# Patient Record
Sex: MALE | Race: WHITE | NOT HISPANIC OR LATINO | Employment: OTHER | URBAN - METROPOLITAN AREA
[De-identification: names, ages, dates, MRNs, and addresses within clinical notes are randomized per-mention and may not be internally consistent; named-entity substitution may affect disease eponyms.]

---

## 2019-07-02 ENCOUNTER — APPOINTMENT (OUTPATIENT)
Dept: CT IMAGING | Facility: HOSPITAL | Age: 59
End: 2019-07-02

## 2019-07-02 ENCOUNTER — APPOINTMENT (OUTPATIENT)
Dept: GENERAL RADIOLOGY | Facility: HOSPITAL | Age: 59
End: 2019-07-02

## 2019-07-02 ENCOUNTER — HOSPITAL ENCOUNTER (EMERGENCY)
Facility: HOSPITAL | Age: 59
Discharge: LEFT BEFORE DISCHARGE | End: 2019-07-02
Attending: EMERGENCY MEDICINE | Admitting: EMERGENCY MEDICINE

## 2019-07-02 VITALS
HEART RATE: 61 BPM | HEIGHT: 72 IN | OXYGEN SATURATION: 100 % | BODY MASS INDEX: 23.7 KG/M2 | WEIGHT: 175 LBS | SYSTOLIC BLOOD PRESSURE: 128 MMHG | RESPIRATION RATE: 16 BRPM | DIASTOLIC BLOOD PRESSURE: 88 MMHG | TEMPERATURE: 97.5 F

## 2019-07-02 DIAGNOSIS — R03.0 ELEVATED BLOOD PRESSURE READING: ICD-10-CM

## 2019-07-02 DIAGNOSIS — R41.82 ACUTE ALTERATION IN MENTAL STATUS: Primary | ICD-10-CM

## 2019-07-02 DIAGNOSIS — L25.9 CONTACT DERMATITIS, UNSPECIFIED CONTACT DERMATITIS TYPE, UNSPECIFIED TRIGGER: ICD-10-CM

## 2019-07-02 DIAGNOSIS — M62.82 NON-TRAUMATIC RHABDOMYOLYSIS: ICD-10-CM

## 2019-07-02 DIAGNOSIS — R60.0 ARM EDEMA: ICD-10-CM

## 2019-07-02 LAB
ALBUMIN SERPL-MCNC: 3.4 G/DL (ref 3.5–5.2)
ALBUMIN/GLOB SERPL: 1 G/DL
ALP SERPL-CCNC: 69 U/L (ref 39–117)
ALT SERPL W P-5'-P-CCNC: 17 U/L (ref 1–41)
ANION GAP SERPL CALCULATED.3IONS-SCNC: 12 MMOL/L (ref 5–15)
APTT PPP: 34.5 SECONDS (ref 24–37)
AST SERPL-CCNC: 30 U/L (ref 1–40)
BASOPHILS # BLD AUTO: 0.05 10*3/MM3 (ref 0–0.2)
BASOPHILS NFR BLD AUTO: 0.6 % (ref 0–1.5)
BILIRUB SERPL-MCNC: 0.2 MG/DL (ref 0.2–1.2)
BUN BLD-MCNC: 19 MG/DL (ref 6–20)
BUN BLDA-MCNC: 20 MG/DL (ref 8–26)
BUN/CREAT SERPL: 22.6 (ref 7–25)
CA-I BLDA-SCNC: 1.27 MMOL/L (ref 1.2–1.32)
CALCIUM SPEC-SCNC: 9.2 MG/DL (ref 8.6–10.5)
CHLORIDE BLDA-SCNC: 103 MMOL/L (ref 98–109)
CHLORIDE SERPL-SCNC: 104 MMOL/L (ref 98–107)
CK SERPL-CCNC: 207 U/L (ref 20–200)
CO2 BLDA-SCNC: 26 MMOL/L (ref 24–29)
CO2 SERPL-SCNC: 24 MMOL/L (ref 22–29)
CREAT BLD-MCNC: 0.84 MG/DL (ref 0.76–1.27)
CREAT BLDA-MCNC: 0.7 MG/DL (ref 0.6–1.3)
DEPRECATED RDW RBC AUTO: 49.7 FL (ref 37–54)
EOSINOPHIL # BLD AUTO: 0.24 10*3/MM3 (ref 0–0.4)
EOSINOPHIL NFR BLD AUTO: 2.7 % (ref 0.3–6.2)
ERYTHROCYTE [DISTWIDTH] IN BLOOD BY AUTOMATED COUNT: 14.7 % (ref 12.3–15.4)
ETHANOL BLD-MCNC: <10 MG/DL (ref 0–10)
GFR SERPL CREATININE-BSD FRML MDRD: 94 ML/MIN/1.73
GLOBULIN UR ELPH-MCNC: 3.3 GM/DL
GLUCOSE BLD-MCNC: 120 MG/DL (ref 65–99)
GLUCOSE BLDC GLUCOMTR-MCNC: 115 MG/DL (ref 70–130)
HCT VFR BLD AUTO: 35.5 % (ref 37.5–51)
HCT VFR BLDA CALC: 35 % (ref 38–51)
HGB BLD-MCNC: 12.2 G/DL (ref 13–17.7)
HGB BLDA-MCNC: 11.9 G/DL (ref 12–17)
IMM GRANULOCYTES # BLD AUTO: 0.03 10*3/MM3 (ref 0–0.05)
IMM GRANULOCYTES NFR BLD AUTO: 0.3 % (ref 0–0.5)
INR PPP: 1.1 (ref 0.8–1.2)
LIPASE SERPL-CCNC: 174 U/L (ref 13–60)
LYMPHOCYTES # BLD AUTO: 2.51 10*3/MM3 (ref 0.7–3.1)
LYMPHOCYTES NFR BLD AUTO: 28.4 % (ref 19.6–45.3)
MCH RBC QN AUTO: 31.4 PG (ref 26.6–33)
MCHC RBC AUTO-ENTMCNC: 34.4 G/DL (ref 31.5–35.7)
MCV RBC AUTO: 91.3 FL (ref 79–97)
MONOCYTES # BLD AUTO: 0.63 10*3/MM3 (ref 0.1–0.9)
MONOCYTES NFR BLD AUTO: 7.1 % (ref 5–12)
NEUTROPHILS # BLD AUTO: 5.37 10*3/MM3 (ref 1.7–7)
NEUTROPHILS NFR BLD AUTO: 60.9 % (ref 42.7–76)
NRBC BLD AUTO-RTO: 0 /100 WBC (ref 0–0.2)
NT-PROBNP SERPL-MCNC: 357.9 PG/ML (ref 5–900)
PLATELET # BLD AUTO: 222 10*3/MM3 (ref 140–450)
PMV BLD AUTO: 10.8 FL (ref 6–12)
POTASSIUM BLD-SCNC: 4.4 MMOL/L (ref 3.5–5.2)
POTASSIUM BLDA-SCNC: 4.2 MMOL/L (ref 3.5–4.9)
PROT SERPL-MCNC: 6.7 G/DL (ref 6–8.5)
PROTHROMBIN TIME: 13 SECONDS (ref 12.8–15.2)
RBC # BLD AUTO: 3.89 10*6/MM3 (ref 4.14–5.8)
SODIUM BLD-SCNC: 140 MMOL/L (ref 136–145)
SODIUM BLDA-SCNC: 141 MMOL/L (ref 138–146)
TROPONIN T SERPL-MCNC: <0.01 NG/ML (ref 0–0.03)
WBC NRBC COR # BLD: 8.83 10*3/MM3 (ref 3.4–10.8)

## 2019-07-02 PROCEDURE — 85014 HEMATOCRIT: CPT

## 2019-07-02 PROCEDURE — 70498 CT ANGIOGRAPHY NECK: CPT

## 2019-07-02 PROCEDURE — 99285 EMERGENCY DEPT VISIT HI MDM: CPT

## 2019-07-02 PROCEDURE — 25010000002 ONDANSETRON PER 1 MG: Performed by: EMERGENCY MEDICINE

## 2019-07-02 PROCEDURE — 83690 ASSAY OF LIPASE: CPT | Performed by: EMERGENCY MEDICINE

## 2019-07-02 PROCEDURE — 85730 THROMBOPLASTIN TIME PARTIAL: CPT | Performed by: EMERGENCY MEDICINE

## 2019-07-02 PROCEDURE — 70450 CT HEAD/BRAIN W/O DYE: CPT

## 2019-07-02 PROCEDURE — 83880 ASSAY OF NATRIURETIC PEPTIDE: CPT | Performed by: EMERGENCY MEDICINE

## 2019-07-02 PROCEDURE — 82550 ASSAY OF CK (CPK): CPT | Performed by: EMERGENCY MEDICINE

## 2019-07-02 PROCEDURE — 80307 DRUG TEST PRSMV CHEM ANLYZR: CPT | Performed by: EMERGENCY MEDICINE

## 2019-07-02 PROCEDURE — 0042T HC CT CEREBRAL PERFUSION W/WO CONTRAST: CPT

## 2019-07-02 PROCEDURE — 70496 CT ANGIOGRAPHY HEAD: CPT

## 2019-07-02 PROCEDURE — 71045 X-RAY EXAM CHEST 1 VIEW: CPT

## 2019-07-02 PROCEDURE — 0 IOPAMIDOL PER 1 ML: Performed by: EMERGENCY MEDICINE

## 2019-07-02 PROCEDURE — 80053 COMPREHEN METABOLIC PANEL: CPT | Performed by: EMERGENCY MEDICINE

## 2019-07-02 PROCEDURE — 96374 THER/PROPH/DIAG INJ IV PUSH: CPT

## 2019-07-02 PROCEDURE — 80047 BASIC METABLC PNL IONIZED CA: CPT

## 2019-07-02 PROCEDURE — 85025 COMPLETE CBC W/AUTO DIFF WBC: CPT | Performed by: EMERGENCY MEDICINE

## 2019-07-02 PROCEDURE — 84484 ASSAY OF TROPONIN QUANT: CPT | Performed by: EMERGENCY MEDICINE

## 2019-07-02 PROCEDURE — 85610 PROTHROMBIN TIME: CPT

## 2019-07-02 PROCEDURE — 93005 ELECTROCARDIOGRAM TRACING: CPT | Performed by: EMERGENCY MEDICINE

## 2019-07-02 RX ORDER — SODIUM CHLORIDE 0.9 % (FLUSH) 0.9 %
10 SYRINGE (ML) INJECTION AS NEEDED
Status: DISCONTINUED | OUTPATIENT
Start: 2019-07-02 | End: 2019-07-03 | Stop reason: HOSPADM

## 2019-07-02 RX ORDER — ONDANSETRON 2 MG/ML
4 INJECTION INTRAMUSCULAR; INTRAVENOUS ONCE
Status: COMPLETED | OUTPATIENT
Start: 2019-07-02 | End: 2019-07-02

## 2019-07-02 RX ADMIN — IOPAMIDOL 115 ML: 755 INJECTION, SOLUTION INTRAVENOUS at 20:04

## 2019-07-02 RX ADMIN — SODIUM CHLORIDE, POTASSIUM CHLORIDE, SODIUM LACTATE AND CALCIUM CHLORIDE 1000 ML: 600; 310; 30; 20 INJECTION, SOLUTION INTRAVENOUS at 20:50

## 2019-07-02 RX ADMIN — ONDANSETRON 4 MG: 2 INJECTION INTRAMUSCULAR; INTRAVENOUS at 20:51

## 2019-07-02 NOTE — ED PROVIDER NOTES
Subjective   Jaime Connell is a 59 y.o.male who presents to the emergency department with complaints of altered mental status. The patient's last known normal was today at 19:00. He was brought to the emergency department by EMS, who report they were called to the patient's house for vomiting. Upon their arrival, they found the patient laying on the ground unresponsive and his family states that he became unresponsive at 19:00. Per EMS, his family notes increased swelling in his extremities and vomiting. Upon arrival his family denies anticoagulation therapy and bleeding disorders. The patient does not have a history of cerebrovascular attack and seizures. There are no other known acute complaints at this time.        History provided by:  Patient, EMS personnel and relative  History limited by:  Patient unresponsive  Altered Mental Status   Presenting symptoms: unresponsiveness    Severity:  Severe  Most recent episode:  Today  Episode history:  Single  Duration:  1 hour  Timing:  Constant  Progression:  Improving  Chronicity:  New  Associated symptoms: vomiting and weakness        Review of Systems   Unable to perform ROS: Patient unresponsive   Gastrointestinal: Positive for vomiting.   Neurological: Positive for speech difficulty and weakness. Negative for facial asymmetry.       Past Medical History:   Diagnosis Date   • Back pain        Allergies   Allergen Reactions   • Penicillins Unknown (See Comments)     Pt unable to verify.       Past Surgical History:   Procedure Laterality Date   • BACK SURGERY         History reviewed. No pertinent family history.    Social History     Socioeconomic History   • Marital status: Single     Spouse name: Not on file   • Number of children: Not on file   • Years of education: Not on file   • Highest education level: Not on file   Tobacco Use   • Smoking status: Current Every Day Smoker     Packs/day: 1.50     Types: Cigarettes   Substance and Sexual Activity   • Alcohol  use: No     Frequency: Never   • Drug use: No         Objective   Physical Exam   Constitutional: He appears well-developed and well-nourished. No distress.   HENT:   Head: Normocephalic and atraumatic.   Eyes: Conjunctivae are normal. Pupils are equal, round, and reactive to light. No scleral icterus.   Neck: Normal range of motion. Neck supple.   Cardiovascular: Normal rate, regular rhythm and normal heart sounds.   Pulmonary/Chest: Effort normal. No respiratory distress.   Abdominal: Soft. There is no tenderness.   Musculoskeletal: Normal range of motion. He exhibits edema (BUE's involving hands and forearms to elbow. ).   Neurological: He is alert. No cranial nerve deficit. Coordination abnormal. GCS eye subscore is 4. GCS verbal subscore is 2. GCS motor subscore is 6.   Slight weakness in left arm. Able to hold up both legs.  Unintelligible noises with speech. No facial asymmetry.  Able to make eye contact and follow commands. Mild delayed  of left hand. NIHSS 9   Skin: Skin is warm and dry. Capillary refill takes less than 2 seconds.   Psychiatric: His behavior is normal.   Nursing note and vitals reviewed.      Critical Care  Performed by: Darci Fernando MD  Authorized by: Darci Fernando MD     Critical care provider statement:     Critical care time (minutes):  45    Critical care end time:  7/2/2019 10:42 PM    Critical care was necessary to treat or prevent imminent or life-threatening deterioration of the following conditions:  CNS failure or compromise    Critical care was time spent personally by me on the following activities:  Development of treatment plan with patient or surrogate, examination of patient, obtaining history from patient or surrogate, ordering and performing treatments and interventions, ordering and review of laboratory studies, ordering and review of radiographic studies, pulse oximetry and re-evaluation of patient's condition             ED Course  ED Course as of  Jul 02 2243 Tue Jul 02, 2019 1954 Dr. Fernando tried to contact the family at the home number in our system but they did not answer. Not a candidate for tpa at this point secondary to inability to obtain history, medications, and prior medical history. There is minimal history at our facility. No other charts available, unknown risk factors, or exclusion criteria.   [BM]   1958 I reviewed personally reviewed the images and report with radiology demonstrating no evidence of intracerebral hemorrhage or acute abnormality. CT Head Without Contrast Stroke Protocol [RS]   2005 I was able to talk to the family that is now arrived at the emergency department.  They do confirm the symptoms were sudden onset at 7 PM.  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  He states since then he has had some swelling of both arms with associated discomfort.  Otherwise, they state he was acting normally up until 7 PM tonight.  Prior he did not have any deficits or speech issues.  No history of seizures, alcohol consumption, stroke, or similar.  They deny any history of recent bleeding, bleeding disorder, anticoagulants, or other exclusion criteria.  I did discuss the risks and benefits of TPA if there is an abnormality demonstrated on perfusion scan.  I discussed the benefits of improvement versus the risk of intracerebral or bleeding elsewhere.  They say they are agreeable with proceeding if there is an abnormality on his imaging study.  [RS]   2011 Patient's mental status is improving and he is able to speak some words now.  He is more alert and more responsive.  [RS]   2056 Troponin T: <0.010 [RS]   2056 proBNP: 357.9 [RS]   2056 The patient is now back to neurologic baseline.  He is alert and oriented and able to answer questions appropriately with normal speech.  I talked with him about the plan for further evaluation and admission  seizure versus TIA.  Patient is refusing admission and had initially refused any further evaluation here in the emergency department.  I talked with him about my concerns with the differential diagnosis and potential for life-threatening or debilitating etiology.  After consideration, the patient is agreeable for further evaluation here in the emergency department but he refuses adamantly admission to the hospital or MRI.  Patient states he wants to leave the hospital in about 1 hour.  [RS]   2141 Creatine Kinase: (!) 207 [RS]   2205 Patient is remained stable.  He is now back to neurologic baseline.  I talked with him once again to discuss possible admission.  The patient continues to refuse MRI or admission to the hospital.  We reviewed the risks once again.  I advised him to return immediately to the emergency department if he has any change of mind or concerning symptoms/worsening.  Talked with the wife and she states that she is unable to talk him into staying.  The patient voices understanding and is agreeable with return instructions.   [RS]      ED Course User Index  [BM] Gabino Reyes  [RS] Darci Fernando MD       Recent Results (from the past 24 hour(s))   POC Protime / INR    Collection Time: 07/02/19  7:56 PM   Result Value Ref Range    Protime 13.0 12.8 - 15.2 seconds    INR 1.1 0.8 - 1.2   POC CHEM 8    Collection Time: 07/02/19  7:58 PM   Result Value Ref Range    Glucose 115 70 - 130 mg/dL    BUN 20 8 - 26 mg/dL    Creatinine 0.70 0.60 - 1.30 mg/dL    Sodium 141 138 - 146 mmol/L    Potassium 4.2 3.5 - 4.9 mmol/L    Chloride 103 98 - 109 mmol/L    Total CO2 26 24 - 29 mmol/L    Hemoglobin 11.9 (L) 12.0 - 17.0 g/dL    Hematocrit 35 (L) 38 - 51 %    Ionized Calcium 1.27 1.20 - 1.32 mmol/L   Comprehensive Metabolic Panel    Collection Time: 07/02/19  8:11 PM   Result Value Ref Range    Glucose 120 (H) 65 - 99 mg/dL    BUN 19 6 - 20 mg/dL    Creatinine 0.84 0.76 - 1.27 mg/dL    Sodium 140 136  - 145 mmol/L    Potassium 4.4 3.5 - 5.2 mmol/L    Chloride 104 98 - 107 mmol/L    CO2 24.0 22.0 - 29.0 mmol/L    Calcium 9.2 8.6 - 10.5 mg/dL    Total Protein 6.7 6.0 - 8.5 g/dL    Albumin 3.40 (L) 3.50 - 5.20 g/dL    ALT (SGPT) 17 1 - 41 U/L    AST (SGOT) 30 1 - 40 U/L    Alkaline Phosphatase 69 39 - 117 U/L    Total Bilirubin 0.2 0.2 - 1.2 mg/dL    eGFR Non African Amer 94 >60 mL/min/1.73    Globulin 3.3 gm/dL    A/G Ratio 1.0 g/dL    BUN/Creatinine Ratio 22.6 7.0 - 25.0    Anion Gap 12.0 5.0 - 15.0 mmol/L   Lipase    Collection Time: 07/02/19  8:11 PM   Result Value Ref Range    Lipase 174 (H) 13 - 60 U/L   BNP    Collection Time: 07/02/19  8:11 PM   Result Value Ref Range    proBNP 357.9 5.0 - 900.0 pg/mL   Troponin    Collection Time: 07/02/19  8:11 PM   Result Value Ref Range    Troponin T <0.010 0.000 - 0.030 ng/mL   CBC Auto Differential    Collection Time: 07/02/19  8:11 PM   Result Value Ref Range    WBC 8.83 3.40 - 10.80 10*3/mm3    RBC 3.89 (L) 4.14 - 5.80 10*6/mm3    Hemoglobin 12.2 (L) 13.0 - 17.7 g/dL    Hematocrit 35.5 (L) 37.5 - 51.0 %    MCV 91.3 79.0 - 97.0 fL    MCH 31.4 26.6 - 33.0 pg    MCHC 34.4 31.5 - 35.7 g/dL    RDW 14.7 12.3 - 15.4 %    RDW-SD 49.7 37.0 - 54.0 fl    MPV 10.8 6.0 - 12.0 fL    Platelets 222 140 - 450 10*3/mm3    Neutrophil % 60.9 42.7 - 76.0 %    Lymphocyte % 28.4 19.6 - 45.3 %    Monocyte % 7.1 5.0 - 12.0 %    Eosinophil % 2.7 0.3 - 6.2 %    Basophil % 0.6 0.0 - 1.5 %    Immature Grans % 0.3 0.0 - 0.5 %    Neutrophils, Absolute 5.37 1.70 - 7.00 10*3/mm3    Lymphocytes, Absolute 2.51 0.70 - 3.10 10*3/mm3    Monocytes, Absolute 0.63 0.10 - 0.90 10*3/mm3    Eosinophils, Absolute 0.24 0.00 - 0.40 10*3/mm3    Basophils, Absolute 0.05 0.00 - 0.20 10*3/mm3    Immature Grans, Absolute 0.03 0.00 - 0.05 10*3/mm3    nRBC 0.0 0.0 - 0.2 /100 WBC   CK    Collection Time: 07/02/19  8:11 PM   Result Value Ref Range    Creatine Kinase 207 (H) 20 - 200 U/L   Ethanol    Collection Time:  07/02/19  8:11 PM   Result Value Ref Range    Ethanol <10 0 - 10 mg/dL   aPTT    Collection Time: 07/02/19  8:52 PM   Result Value Ref Range    PTT 34.5 24.0 - 37.0 seconds     Note: In addition to lab results from this visit, the labs listed above may include labs taken at another facility or during a different encounter within the last 24 hours. Please correlate lab times with ED admission and discharge times for further clarification of the services performed during this visit.    XR Chest 1 View   Final Result   No acute cardiopulmonary findings.      Signer Name: KURT Sheets MD    Signed: 7/2/2019 10:31 PM    Workstation Name: [email or web address]          CT Angiogram Neck With & Without Contrast         CT Angiogram Head With & Without Contrast         CT Cerebral Perfusion With & Without Contrast   Preliminary Result   Normal brain perfusion CT.                  CT Head Without Contrast Stroke Protocol   Final Result   Negative noncontrast CT of the brain. No evidence of acute intracranial hemorrhage..         NOTIFICATION: Critical Value/emergent results were called by telephone at the time of interpretation on 7/2/2019 7:55 PM to CT TechnologistBasil, who verbally acknowledged these results and indicated she would notify the ER physician.      Signer Name: Sabino Rayo MD    Signed: 7/2/2019 7:57 PM    Workstation Name: [email or web address]            Vitals:    07/02/19 2130 07/02/19 2145 07/02/19 2200 07/02/19 2215   BP: 152/89 147/91 149/96 128/88   BP Location:       Patient Position:       Pulse: 50 51 53 61   Resp:       Temp:       TempSrc:       SpO2: 100% 100% 100% 100%   Weight:       Height:         Medications   sodium chloride 0.9 % flush 10 mL (not administered)   sodium chloride 0.9 % flush 10 mL (not administered)   iopamidol (ISOVUE-370) 76 % injection 150 mL (115 mL Intravenous Given 7/2/19 2004)   lactated ringers bolus 1,000 mL (0 mL Intravenous Stopped 7/2/19 2152)   ondansetron (ZOFRAN)  injection 4 mg (4 mg Intravenous Given 7/2/19 2051)     ECG/EMG Results (last 24 hours)     ** No results found for the last 24 hours. **        ECG 12 Lead   Final Result   Test Reason : Acute Stroke Protocol (onset < 12 hrs)   Blood Pressure : **/** mmHG   Vent. Rate : 059 BPM     Atrial Rate : 059 BPM      P-R Int : 146 ms          QRS Dur : 092 ms       QT Int : 404 ms       P-R-T Axes : 073 076 062 degrees      QTc Int : 399 ms      Sinus bradycardia   Otherwise normal ECG   No previous ECGs available   Confirmed by ELIZABETH FERNANDO MD ([id number]) on 7/2/2019 10:30:01 PM      Referred By:  TERESSA           Confirmed By:ELIZABETH FERNANDO MD                        MDM  Number of Diagnoses or Management Options  Acute alteration in mental status:   Arm edema:   Contact dermatitis, unspecified contact dermatitis type, unspecified trigger:   Elevated blood pressure reading:   Non-traumatic rhabdomyolysis:      Amount and/or Complexity of Data Reviewed  Clinical lab tests: reviewed  Tests in the radiology section of CPT®: reviewed  Decide to obtain previous medical records or to obtain history from someone other than the patient: yes  Obtain history from someone other than the patient: yes  Independent visualization of images, tracings, or specimens: yes        Final diagnoses:   Acute alteration in mental status   Arm edema   Contact dermatitis, unspecified contact dermatitis type, unspecified trigger   Non-traumatic rhabdomyolysis   Elevated blood pressure reading       Documentation assistance provided by dean Reyes.  Information recorded by the scribe was done at my direction and has been verified and validated by me.     Gabino Reyes  07/02/19 2015       Elizabeth Fernando MD  07/02/19 3799

## 2019-07-19 ENCOUNTER — HOSPITAL ENCOUNTER (INPATIENT)
Facility: HOSPITAL | Age: 59
LOS: 3 days | Discharge: HOME | End: 2019-07-23
Attending: EMERGENCY MEDICINE | Admitting: INTERNAL MEDICINE

## 2019-07-19 DIAGNOSIS — K25.2 ACUTE GASTRIC ULCER WITH HEMORRHAGE AND PERFORATION (HCC): Primary | ICD-10-CM

## 2019-07-19 DIAGNOSIS — Z74.09 IMPAIRED FUNCTIONAL MOBILITY, BALANCE, GAIT, AND ENDURANCE: ICD-10-CM

## 2019-07-19 DIAGNOSIS — R58 HYPOTENSION DUE TO BLOOD LOSS: ICD-10-CM

## 2019-07-19 DIAGNOSIS — I95.89 HYPOTENSION DUE TO BLOOD LOSS: ICD-10-CM

## 2019-07-19 DIAGNOSIS — K92.0 HEMATEMESIS WITH NAUSEA: ICD-10-CM

## 2019-07-19 DIAGNOSIS — R11.2 NAUSEA AND VOMITING IN ADULT: ICD-10-CM

## 2019-07-19 PROCEDURE — 84145 PROCALCITONIN (PCT): CPT | Performed by: EMERGENCY MEDICINE

## 2019-07-19 PROCEDURE — 84484 ASSAY OF TROPONIN QUANT: CPT | Performed by: EMERGENCY MEDICINE

## 2019-07-19 PROCEDURE — 99285 EMERGENCY DEPT VISIT HI MDM: CPT

## 2019-07-19 PROCEDURE — 86850 RBC ANTIBODY SCREEN: CPT | Performed by: EMERGENCY MEDICINE

## 2019-07-19 PROCEDURE — 86920 COMPATIBILITY TEST SPIN: CPT

## 2019-07-19 PROCEDURE — 93005 ELECTROCARDIOGRAM TRACING: CPT

## 2019-07-19 PROCEDURE — 93005 ELECTROCARDIOGRAM TRACING: CPT | Performed by: EMERGENCY MEDICINE

## 2019-07-19 PROCEDURE — 83880 ASSAY OF NATRIURETIC PEPTIDE: CPT | Performed by: EMERGENCY MEDICINE

## 2019-07-19 PROCEDURE — 85025 COMPLETE CBC W/AUTO DIFF WBC: CPT | Performed by: EMERGENCY MEDICINE

## 2019-07-19 PROCEDURE — 86901 BLOOD TYPING SEROLOGIC RH(D): CPT | Performed by: EMERGENCY MEDICINE

## 2019-07-19 PROCEDURE — 86900 BLOOD TYPING SEROLOGIC ABO: CPT | Performed by: EMERGENCY MEDICINE

## 2019-07-19 PROCEDURE — 80053 COMPREHEN METABOLIC PANEL: CPT | Performed by: EMERGENCY MEDICINE

## 2019-07-19 RX ORDER — SODIUM CHLORIDE 0.9 % (FLUSH) 0.9 %
10 SYRINGE (ML) INJECTION AS NEEDED
Status: DISCONTINUED | OUTPATIENT
Start: 2019-07-19 | End: 2019-07-23 | Stop reason: HOSPADM

## 2019-07-20 ENCOUNTER — ANESTHESIA EVENT (OUTPATIENT)
Dept: GASTROENTEROLOGY | Facility: HOSPITAL | Age: 59
End: 2019-07-20

## 2019-07-20 ENCOUNTER — APPOINTMENT (OUTPATIENT)
Dept: GENERAL RADIOLOGY | Facility: HOSPITAL | Age: 59
End: 2019-07-20

## 2019-07-20 ENCOUNTER — ANESTHESIA (OUTPATIENT)
Dept: GASTROENTEROLOGY | Facility: HOSPITAL | Age: 59
End: 2019-07-20

## 2019-07-20 ENCOUNTER — APPOINTMENT (OUTPATIENT)
Dept: CT IMAGING | Facility: HOSPITAL | Age: 59
End: 2019-07-20

## 2019-07-20 PROBLEM — K25.2 ACUTE GASTRIC ULCER WITH HEMORRHAGE AND PERFORATION (HCC): Status: ACTIVE | Noted: 2019-07-20

## 2019-07-20 PROBLEM — R76.8 HEPATITIS C ANTIBODY TEST POSITIVE: Status: ACTIVE | Noted: 2019-07-20

## 2019-07-20 PROBLEM — D50.0 ANEMIA, BLOOD LOSS: Status: ACTIVE | Noted: 2019-07-20

## 2019-07-20 PROBLEM — F17.200 SMOKER: Status: ACTIVE | Noted: 2019-07-20

## 2019-07-20 LAB
ABO GROUP BLD: NORMAL
ABO GROUP BLD: NORMAL
ALBUMIN SERPL-MCNC: 3.5 G/DL (ref 3.5–5.2)
ALBUMIN/GLOB SERPL: 1.2 G/DL
ALP SERPL-CCNC: 78 U/L (ref 39–117)
ALT SERPL W P-5'-P-CCNC: 100 U/L (ref 1–41)
ANION GAP SERPL CALCULATED.3IONS-SCNC: 7 MMOL/L (ref 5–15)
ANION GAP SERPL CALCULATED.3IONS-SCNC: 9 MMOL/L (ref 5–15)
AST SERPL-CCNC: 114 U/L (ref 1–40)
BASOPHILS # BLD AUTO: 0.04 10*3/MM3 (ref 0–0.2)
BASOPHILS # BLD AUTO: 0.05 10*3/MM3 (ref 0–0.2)
BASOPHILS NFR BLD AUTO: 0.5 % (ref 0–1.5)
BASOPHILS NFR BLD AUTO: 0.5 % (ref 0–1.5)
BILIRUB SERPL-MCNC: 0.3 MG/DL (ref 0.2–1.2)
BLD GP AB SCN SERPL QL: NEGATIVE
BUN BLD-MCNC: 32 MG/DL (ref 6–20)
BUN BLD-MCNC: 34 MG/DL (ref 6–20)
BUN/CREAT SERPL: 32 (ref 7–25)
BUN/CREAT SERPL: 36.2 (ref 7–25)
CALCIUM SPEC-SCNC: 8 MG/DL (ref 8.6–10.5)
CALCIUM SPEC-SCNC: 9 MG/DL (ref 8.6–10.5)
CHLORIDE SERPL-SCNC: 100 MMOL/L (ref 98–107)
CHLORIDE SERPL-SCNC: 108 MMOL/L (ref 98–107)
CO2 SERPL-SCNC: 26 MMOL/L (ref 22–29)
CO2 SERPL-SCNC: 31 MMOL/L (ref 22–29)
CREAT BLD-MCNC: 0.94 MG/DL (ref 0.76–1.27)
CREAT BLD-MCNC: 1 MG/DL (ref 0.76–1.27)
DEPRECATED RDW RBC AUTO: 51.4 FL (ref 37–54)
DEPRECATED RDW RBC AUTO: 52 FL (ref 37–54)
EOSINOPHIL # BLD AUTO: 0.14 10*3/MM3 (ref 0–0.4)
EOSINOPHIL # BLD AUTO: 0.26 10*3/MM3 (ref 0–0.4)
EOSINOPHIL NFR BLD AUTO: 1.7 % (ref 0.3–6.2)
EOSINOPHIL NFR BLD AUTO: 2.5 % (ref 0.3–6.2)
ERYTHROCYTE [DISTWIDTH] IN BLOOD BY AUTOMATED COUNT: 15.5 % (ref 12.3–15.4)
ERYTHROCYTE [DISTWIDTH] IN BLOOD BY AUTOMATED COUNT: 15.6 % (ref 12.3–15.4)
GFR SERPL CREATININE-BSD FRML MDRD: 76 ML/MIN/1.73
GFR SERPL CREATININE-BSD FRML MDRD: 82 ML/MIN/1.73
GLOBULIN UR ELPH-MCNC: 3 GM/DL
GLUCOSE BLD-MCNC: 107 MG/DL (ref 65–99)
GLUCOSE BLD-MCNC: 178 MG/DL (ref 65–99)
HAV IGM SERPL QL IA: ABNORMAL
HBA1C MFR BLD: 5.9 % (ref 4.8–5.6)
HBV CORE IGM SERPL QL IA: ABNORMAL
HBV SURFACE AG SERPL QL IA: ABNORMAL
HCT VFR BLD AUTO: 28.4 % (ref 37.5–51)
HCT VFR BLD AUTO: 29 % (ref 37.5–51)
HCT VFR BLD AUTO: 30.9 % (ref 37.5–51)
HCV AB SER DONR QL: REACTIVE
HGB BLD-MCNC: 8.5 G/DL (ref 13–17.7)
HGB BLD-MCNC: 9.1 G/DL (ref 13–17.7)
HGB BLD-MCNC: 9.5 G/DL (ref 13–17.7)
HIV1+2 AB SER QL: NORMAL
HOLD SPECIMEN: NORMAL
HOLD SPECIMEN: NORMAL
IMM GRANULOCYTES # BLD AUTO: 0.03 10*3/MM3 (ref 0–0.05)
IMM GRANULOCYTES # BLD AUTO: 0.08 10*3/MM3 (ref 0–0.05)
IMM GRANULOCYTES NFR BLD AUTO: 0.4 % (ref 0–0.5)
IMM GRANULOCYTES NFR BLD AUTO: 0.8 % (ref 0–0.5)
LYMPHOCYTES # BLD AUTO: 2.52 10*3/MM3 (ref 0.7–3.1)
LYMPHOCYTES # BLD AUTO: 2.97 10*3/MM3 (ref 0.7–3.1)
LYMPHOCYTES NFR BLD AUTO: 24.4 % (ref 19.6–45.3)
LYMPHOCYTES NFR BLD AUTO: 36.8 % (ref 19.6–45.3)
MCH RBC QN AUTO: 28.1 PG (ref 26.6–33)
MCH RBC QN AUTO: 28.3 PG (ref 26.6–33)
MCHC RBC AUTO-ENTMCNC: 30.7 G/DL (ref 31.5–35.7)
MCHC RBC AUTO-ENTMCNC: 31.4 G/DL (ref 31.5–35.7)
MCV RBC AUTO: 90.3 FL (ref 79–97)
MCV RBC AUTO: 91.4 FL (ref 79–97)
MONOCYTES # BLD AUTO: 0.74 10*3/MM3 (ref 0.1–0.9)
MONOCYTES # BLD AUTO: 0.84 10*3/MM3 (ref 0.1–0.9)
MONOCYTES NFR BLD AUTO: 8.1 % (ref 5–12)
MONOCYTES NFR BLD AUTO: 9.2 % (ref 5–12)
NEUTROPHILS # BLD AUTO: 4.15 10*3/MM3 (ref 1.7–7)
NEUTROPHILS # BLD AUTO: 6.56 10*3/MM3 (ref 1.7–7)
NEUTROPHILS NFR BLD AUTO: 51.4 % (ref 42.7–76)
NEUTROPHILS NFR BLD AUTO: 63.7 % (ref 42.7–76)
NRBC BLD AUTO-RTO: 0 /100 WBC (ref 0–0.2)
NRBC BLD AUTO-RTO: 0 /100 WBC (ref 0–0.2)
NT-PROBNP SERPL-MCNC: 171.8 PG/ML (ref 5–900)
PLATELET # BLD AUTO: 281 10*3/MM3 (ref 140–450)
PLATELET # BLD AUTO: 361 10*3/MM3 (ref 140–450)
PMV BLD AUTO: 9.8 FL (ref 6–12)
PMV BLD AUTO: 9.9 FL (ref 6–12)
POTASSIUM BLD-SCNC: 4.2 MMOL/L (ref 3.5–5.2)
POTASSIUM BLD-SCNC: 4.4 MMOL/L (ref 3.5–5.2)
PROCALCITONIN SERPL-MCNC: 0.12 NG/ML (ref 0.1–0.25)
PROT SERPL-MCNC: 6.5 G/DL (ref 6–8.5)
RBC # BLD AUTO: 3.21 10*6/MM3 (ref 4.14–5.8)
RBC # BLD AUTO: 3.38 10*6/MM3 (ref 4.14–5.8)
RH BLD: POSITIVE
RH BLD: POSITIVE
SODIUM BLD-SCNC: 140 MMOL/L (ref 136–145)
SODIUM BLD-SCNC: 141 MMOL/L (ref 136–145)
T&S EXPIRATION DATE: NORMAL
TROPONIN T SERPL-MCNC: <0.01 NG/ML (ref 0–0.03)
TSH SERPL DL<=0.05 MIU/L-ACNC: 0.4 MIU/ML (ref 0.27–4.2)
WBC NRBC COR # BLD: 10.31 10*3/MM3 (ref 3.4–10.8)
WBC NRBC COR # BLD: 8.07 10*3/MM3 (ref 3.4–10.8)
WHOLE BLOOD HOLD SPECIMEN: NORMAL
WHOLE BLOOD HOLD SPECIMEN: NORMAL

## 2019-07-20 PROCEDURE — 87040 BLOOD CULTURE FOR BACTERIA: CPT | Performed by: EMERGENCY MEDICINE

## 2019-07-20 PROCEDURE — 88305 TISSUE EXAM BY PATHOLOGIST: CPT | Performed by: INTERNAL MEDICINE

## 2019-07-20 PROCEDURE — 86900 BLOOD TYPING SEROLOGIC ABO: CPT

## 2019-07-20 PROCEDURE — 86901 BLOOD TYPING SEROLOGIC RH(D): CPT

## 2019-07-20 PROCEDURE — 99291 CRITICAL CARE FIRST HOUR: CPT | Performed by: INTERNAL MEDICINE

## 2019-07-20 PROCEDURE — 0W3P8ZZ CONTROL BLEEDING IN GASTROINTESTINAL TRACT, VIA NATURAL OR ARTIFICIAL OPENING ENDOSCOPIC: ICD-10-PCS | Performed by: INTERNAL MEDICINE

## 2019-07-20 PROCEDURE — 71045 X-RAY EXAM CHEST 1 VIEW: CPT

## 2019-07-20 PROCEDURE — 99223 1ST HOSP IP/OBS HIGH 75: CPT | Performed by: INTERNAL MEDICINE

## 2019-07-20 PROCEDURE — 25010000002 SUCCINYLCHOLINE PER 20 MG: Performed by: ANESTHESIOLOGY

## 2019-07-20 PROCEDURE — 25010000003 LIDOCAINE 1 % SOLUTION: Performed by: ANESTHESIOLOGY

## 2019-07-20 PROCEDURE — 94640 AIRWAY INHALATION TREATMENT: CPT

## 2019-07-20 PROCEDURE — 36430 TRANSFUSION BLD/BLD COMPNT: CPT

## 2019-07-20 PROCEDURE — 84443 ASSAY THYROID STIM HORMONE: CPT | Performed by: INTERNAL MEDICINE

## 2019-07-20 PROCEDURE — P9016 RBC LEUKOCYTES REDUCED: HCPCS

## 2019-07-20 PROCEDURE — 99222 1ST HOSP IP/OBS MODERATE 55: CPT | Performed by: INTERNAL MEDICINE

## 2019-07-20 PROCEDURE — 0 IOPAMIDOL PER 1 ML: Performed by: EMERGENCY MEDICINE

## 2019-07-20 PROCEDURE — 25010000002 HYDROMORPHONE PER 4 MG: Performed by: INTERNAL MEDICINE

## 2019-07-20 PROCEDURE — 94799 UNLISTED PULMONARY SVC/PX: CPT

## 2019-07-20 PROCEDURE — G0432 EIA HIV-1/HIV-2 SCREEN: HCPCS | Performed by: INTERNAL MEDICINE

## 2019-07-20 PROCEDURE — 74018 RADEX ABDOMEN 1 VIEW: CPT

## 2019-07-20 PROCEDURE — 80074 ACUTE HEPATITIS PANEL: CPT | Performed by: INTERNAL MEDICINE

## 2019-07-20 PROCEDURE — 80048 BASIC METABOLIC PNL TOTAL CA: CPT | Performed by: INTERNAL MEDICINE

## 2019-07-20 PROCEDURE — 85018 HEMOGLOBIN: CPT | Performed by: INTERNAL MEDICINE

## 2019-07-20 PROCEDURE — 25010000002 THIAMINE PER 100 MG: Performed by: INTERNAL MEDICINE

## 2019-07-20 PROCEDURE — 0DB68ZX EXCISION OF STOMACH, VIA NATURAL OR ARTIFICIAL OPENING ENDOSCOPIC, DIAGNOSTIC: ICD-10-PCS | Performed by: INTERNAL MEDICINE

## 2019-07-20 PROCEDURE — 25010000002 PHENYLEPHRINE PER 1 ML: Performed by: ANESTHESIOLOGY

## 2019-07-20 PROCEDURE — 25010000002 PROMETHAZINE PER 50 MG: Performed by: INTERNAL MEDICINE

## 2019-07-20 PROCEDURE — 85014 HEMATOCRIT: CPT | Performed by: INTERNAL MEDICINE

## 2019-07-20 PROCEDURE — 83036 HEMOGLOBIN GLYCOSYLATED A1C: CPT | Performed by: INTERNAL MEDICINE

## 2019-07-20 PROCEDURE — 25010000002 PROPOFOL 10 MG/ML EMULSION: Performed by: ANESTHESIOLOGY

## 2019-07-20 PROCEDURE — 74174 CTA ABD&PLVS W/CONTRAST: CPT

## 2019-07-20 PROCEDURE — 85025 COMPLETE CBC W/AUTO DIFF WBC: CPT | Performed by: INTERNAL MEDICINE

## 2019-07-20 DEVICE — SYS CLIP GI OTSC NITNL 12/6T 220CM
Type: IMPLANTABLE DEVICE | Site: STOMACH | Status: NON-FUNCTIONAL
Removed: 2021-05-25

## 2019-07-20 RX ORDER — SUCCINYLCHOLINE CHLORIDE 20 MG/ML
INJECTION INTRAMUSCULAR; INTRAVENOUS AS NEEDED
Status: DISCONTINUED | OUTPATIENT
Start: 2019-07-20 | End: 2019-07-20 | Stop reason: SURG

## 2019-07-20 RX ORDER — IPRATROPIUM BROMIDE AND ALBUTEROL SULFATE 2.5; .5 MG/3ML; MG/3ML
3 SOLUTION RESPIRATORY (INHALATION)
Status: DISCONTINUED | OUTPATIENT
Start: 2019-07-20 | End: 2019-07-21

## 2019-07-20 RX ORDER — SODIUM CHLORIDE 0.9 % (FLUSH) 0.9 %
3 SYRINGE (ML) INJECTION EVERY 12 HOURS SCHEDULED
Status: DISCONTINUED | OUTPATIENT
Start: 2019-07-20 | End: 2019-07-23 | Stop reason: HOSPADM

## 2019-07-20 RX ORDER — FAMOTIDINE 10 MG/ML
20 INJECTION, SOLUTION INTRAVENOUS ONCE
Status: CANCELLED | OUTPATIENT
Start: 2019-07-20 | End: 2019-07-20

## 2019-07-20 RX ORDER — PANTOPRAZOLE SODIUM 40 MG/10ML
80 INJECTION, POWDER, LYOPHILIZED, FOR SOLUTION INTRAVENOUS ONCE
Status: COMPLETED | OUTPATIENT
Start: 2019-07-20 | End: 2019-07-20

## 2019-07-20 RX ORDER — FAMOTIDINE 20 MG/1
20 TABLET, FILM COATED ORAL ONCE
Status: CANCELLED | OUTPATIENT
Start: 2019-07-20 | End: 2019-07-20

## 2019-07-20 RX ORDER — SODIUM CHLORIDE, SODIUM LACTATE, POTASSIUM CHLORIDE, CALCIUM CHLORIDE 600; 310; 30; 20 MG/100ML; MG/100ML; MG/100ML; MG/100ML
9 INJECTION, SOLUTION INTRAVENOUS CONTINUOUS
Status: CANCELLED | OUTPATIENT
Start: 2019-07-20

## 2019-07-20 RX ORDER — IPRATROPIUM BROMIDE AND ALBUTEROL SULFATE 2.5; .5 MG/3ML; MG/3ML
3 SOLUTION RESPIRATORY (INHALATION) EVERY 4 HOURS PRN
Status: DISCONTINUED | OUTPATIENT
Start: 2019-07-20 | End: 2019-07-23 | Stop reason: HOSPADM

## 2019-07-20 RX ORDER — HYDROMORPHONE HYDROCHLORIDE 1 MG/ML
0.5 INJECTION, SOLUTION INTRAMUSCULAR; INTRAVENOUS; SUBCUTANEOUS
Status: CANCELLED | OUTPATIENT
Start: 2019-07-20

## 2019-07-20 RX ORDER — LIDOCAINE HYDROCHLORIDE 10 MG/ML
0.5 INJECTION, SOLUTION EPIDURAL; INFILTRATION; INTRACAUDAL; PERINEURAL ONCE AS NEEDED
Status: CANCELLED | OUTPATIENT
Start: 2019-07-20

## 2019-07-20 RX ORDER — SODIUM CHLORIDE, SODIUM LACTATE, POTASSIUM CHLORIDE, CALCIUM CHLORIDE 600; 310; 30; 20 MG/100ML; MG/100ML; MG/100ML; MG/100ML
INJECTION, SOLUTION INTRAVENOUS CONTINUOUS PRN
Status: DISCONTINUED | OUTPATIENT
Start: 2019-07-20 | End: 2019-07-20 | Stop reason: SURG

## 2019-07-20 RX ORDER — LIDOCAINE HYDROCHLORIDE 10 MG/ML
INJECTION, SOLUTION INFILTRATION; PERINEURAL AS NEEDED
Status: DISCONTINUED | OUTPATIENT
Start: 2019-07-20 | End: 2019-07-20 | Stop reason: SURG

## 2019-07-20 RX ORDER — SODIUM CHLORIDE 0.9 % (FLUSH) 0.9 %
3-10 SYRINGE (ML) INJECTION AS NEEDED
Status: CANCELLED | OUTPATIENT
Start: 2019-07-20

## 2019-07-20 RX ORDER — FENTANYL CITRATE 50 UG/ML
50 INJECTION, SOLUTION INTRAMUSCULAR; INTRAVENOUS
Status: CANCELLED | OUTPATIENT
Start: 2019-07-20

## 2019-07-20 RX ORDER — HYDROMORPHONE HYDROCHLORIDE 1 MG/ML
0.5 INJECTION, SOLUTION INTRAMUSCULAR; INTRAVENOUS; SUBCUTANEOUS
Status: DISCONTINUED | OUTPATIENT
Start: 2019-07-20 | End: 2019-07-23 | Stop reason: HOSPADM

## 2019-07-20 RX ORDER — PROPOFOL 10 MG/ML
VIAL (ML) INTRAVENOUS AS NEEDED
Status: DISCONTINUED | OUTPATIENT
Start: 2019-07-20 | End: 2019-07-20 | Stop reason: SURG

## 2019-07-20 RX ORDER — SODIUM CHLORIDE 0.9 % (FLUSH) 0.9 %
3-10 SYRINGE (ML) INJECTION AS NEEDED
Status: DISCONTINUED | OUTPATIENT
Start: 2019-07-20 | End: 2019-07-23 | Stop reason: HOSPADM

## 2019-07-20 RX ORDER — PROMETHAZINE HYDROCHLORIDE 25 MG/ML
12.5 INJECTION, SOLUTION INTRAMUSCULAR; INTRAVENOUS EVERY 6 HOURS PRN
Status: DISCONTINUED | OUTPATIENT
Start: 2019-07-20 | End: 2019-07-23 | Stop reason: HOSPADM

## 2019-07-20 RX ORDER — SODIUM CHLORIDE, SODIUM LACTATE, POTASSIUM CHLORIDE, CALCIUM CHLORIDE 600; 310; 30; 20 MG/100ML; MG/100ML; MG/100ML; MG/100ML
50 INJECTION, SOLUTION INTRAVENOUS CONTINUOUS
Status: DISCONTINUED | OUTPATIENT
Start: 2019-07-20 | End: 2019-07-23 | Stop reason: HOSPADM

## 2019-07-20 RX ORDER — SODIUM CHLORIDE 0.9 % (FLUSH) 0.9 %
3 SYRINGE (ML) INJECTION EVERY 12 HOURS SCHEDULED
Status: CANCELLED | OUTPATIENT
Start: 2019-07-20

## 2019-07-20 RX ADMIN — HYDROMORPHONE HYDROCHLORIDE 0.5 MG: 1 INJECTION, SOLUTION INTRAMUSCULAR; INTRAVENOUS; SUBCUTANEOUS at 18:32

## 2019-07-20 RX ADMIN — PHENYLEPHRINE HYDROCHLORIDE 400 MCG: 10 INJECTION INTRAVENOUS at 09:20

## 2019-07-20 RX ADMIN — SODIUM CHLORIDE, PRESERVATIVE FREE 3 ML: 5 INJECTION INTRAVENOUS at 03:12

## 2019-07-20 RX ADMIN — THIAMINE HYDROCHLORIDE 100 MG: 100 INJECTION, SOLUTION INTRAMUSCULAR; INTRAVENOUS at 17:30

## 2019-07-20 RX ADMIN — SODIUM CHLORIDE 2000 ML: 9 INJECTION, SOLUTION INTRAVENOUS at 00:15

## 2019-07-20 RX ADMIN — LIDOCAINE HYDROCHLORIDE 50 MG: 10 INJECTION, SOLUTION INFILTRATION; PERINEURAL at 08:55

## 2019-07-20 RX ADMIN — PHENYLEPHRINE HYDROCHLORIDE 200 MCG: 10 INJECTION INTRAVENOUS at 08:55

## 2019-07-20 RX ADMIN — IOPAMIDOL 99 ML: 755 INJECTION, SOLUTION INTRAVENOUS at 00:52

## 2019-07-20 RX ADMIN — SODIUM CHLORIDE, POTASSIUM CHLORIDE, SODIUM LACTATE AND CALCIUM CHLORIDE: 600; 310; 30; 20 INJECTION, SOLUTION INTRAVENOUS at 08:55

## 2019-07-20 RX ADMIN — HYDROMORPHONE HYDROCHLORIDE 0.5 MG: 1 INJECTION, SOLUTION INTRAMUSCULAR; INTRAVENOUS; SUBCUTANEOUS at 20:30

## 2019-07-20 RX ADMIN — SODIUM CHLORIDE, POTASSIUM CHLORIDE, SODIUM LACTATE AND CALCIUM CHLORIDE 100 ML/HR: 600; 310; 30; 20 INJECTION, SOLUTION INTRAVENOUS at 10:27

## 2019-07-20 RX ADMIN — PROPOFOL 150 MG: 10 INJECTION, EMULSION INTRAVENOUS at 08:55

## 2019-07-20 RX ADMIN — HYDROMORPHONE HYDROCHLORIDE 0.5 MG: 1 INJECTION, SOLUTION INTRAMUSCULAR; INTRAVENOUS; SUBCUTANEOUS at 03:26

## 2019-07-20 RX ADMIN — HYDROMORPHONE HYDROCHLORIDE 0.5 MG: 1 INJECTION, SOLUTION INTRAMUSCULAR; INTRAVENOUS; SUBCUTANEOUS at 12:57

## 2019-07-20 RX ADMIN — SODIUM CHLORIDE 8 MG/HR: 900 INJECTION INTRAVENOUS at 12:41

## 2019-07-20 RX ADMIN — SODIUM CHLORIDE, POTASSIUM CHLORIDE, SODIUM LACTATE AND CALCIUM CHLORIDE 100 ML/HR: 600; 310; 30; 20 INJECTION, SOLUTION INTRAVENOUS at 03:12

## 2019-07-20 RX ADMIN — SODIUM CHLORIDE 8 MG/HR: 900 INJECTION INTRAVENOUS at 00:32

## 2019-07-20 RX ADMIN — SODIUM CHLORIDE, PRESERVATIVE FREE 3 ML: 5 INJECTION INTRAVENOUS at 20:21

## 2019-07-20 RX ADMIN — IPRATROPIUM BROMIDE AND ALBUTEROL SULFATE 3 ML: 2.5; .5 SOLUTION RESPIRATORY (INHALATION) at 20:24

## 2019-07-20 RX ADMIN — SODIUM CHLORIDE 8 MG/HR: 900 INJECTION INTRAVENOUS at 22:00

## 2019-07-20 RX ADMIN — SODIUM CHLORIDE 8 MG/HR: 900 INJECTION INTRAVENOUS at 16:40

## 2019-07-20 RX ADMIN — HYDROMORPHONE HYDROCHLORIDE 0.5 MG: 1 INJECTION, SOLUTION INTRAMUSCULAR; INTRAVENOUS; SUBCUTANEOUS at 10:28

## 2019-07-20 RX ADMIN — PHENYLEPHRINE HYDROCHLORIDE 200 MCG: 10 INJECTION INTRAVENOUS at 09:05

## 2019-07-20 RX ADMIN — PROMETHAZINE HYDROCHLORIDE 12.5 MG: 25 INJECTION INTRAMUSCULAR; INTRAVENOUS at 03:26

## 2019-07-20 RX ADMIN — PHENYLEPHRINE HYDROCHLORIDE 200 MCG: 10 INJECTION INTRAVENOUS at 09:10

## 2019-07-20 RX ADMIN — PHENYLEPHRINE HYDROCHLORIDE 200 MCG: 10 INJECTION INTRAVENOUS at 09:15

## 2019-07-20 RX ADMIN — SUCCINYLCHOLINE CHLORIDE 100 MG: 20 INJECTION, SOLUTION INTRAMUSCULAR; INTRAVENOUS at 08:55

## 2019-07-20 RX ADMIN — SODIUM CHLORIDE 8 MG/HR: 900 INJECTION INTRAVENOUS at 05:47

## 2019-07-20 RX ADMIN — PANTOPRAZOLE SODIUM 80 MG: 40 INJECTION, POWDER, FOR SOLUTION INTRAVENOUS at 00:25

## 2019-07-20 NOTE — PROGRESS NOTES
"Intensivist Note     7/20/2019  Hospital Day: 0  Day of Surgery      Mr. Jaime Connell, 59 y.o. male is followed for:    Large penetrating prepyloric gastric ulcer. Clip placed 7/20/2019 (CMS/HCC)    Acute blood loss anemia    Hepatitis C antibody test positive    Smoker       SUBJECTIVE     59-year-old white male active smoker with a history of GERD and previous ulcers and multiple recent visits to [facility] ER for nausea and vomiting for several weeks.  Now admitted at midnight 7/19/2019 for hematemesis and acute blood loss anemia requiring transfusion.  CT revealed gastric ulcer with suspected contained perforation.  Patient has already undergone endoscopy this morning with the finding of a large penetrating prepyloric gastric ulcer which was clipped.  Dr. William is aware of the situation, but feels that if bleeding recurs patient should undergo embolization via angiogram.  At the present time he is extremely lethargic after his procedure and is just mumbling incoherently.  He is however in respiratory distress, and blood pressure remains adequate.  He is not receiving any fluids at the present time and these will need to be started. Hematocrit is stable over the last 6 hours and I see no evidence of active bleeding.    The patient's relevant past medical, surgical and social history were reviewed and updated in Epic as appropriate.    OBJECTIVE     /71   Pulse 70   Temp 97.1 °F (36.2 °C) (Axillary)   Resp 18   Ht 182.9 cm (72.01\")   Wt 79.4 kg (175 lb 0.7 oz)   SpO2 94%   BMI 23.74 kg/m²      Flow (L/min): 3    Flowsheet Rows      First Filed Value   Admission Height  182.9 cm (72.01\") Documented at 07/19/2019 2300   Admission Weight  79.4 kg (175 lb 0.7 oz) Documented at 07/19/2019 2300        Intake & Output (last day)       07/19 0701 - 07/20 0700 07/20 0701 - 07/21 0700    I.V. (mL/kg) 407 (5.1)     Blood 579     IV Piggyback 2000     Total Intake(mL/kg) 2986 (37.6)     Net +2986  "                Exam:  General Exam:  Chronically ill-appearing thin white male moaning and mumbling  HEENT: Pupils equal and reactive. Nose and throat clear.  Neck:                          Supple, no JVD, thyromegaly, or adenopathy  Lungs: Few scattered rhonchi.  Cardiovascular: RRR without murmurs or gallops.  HR 60 bpm  Abdomen: Soft with some mild tenderness in the epigastrium but no organomegaly or masses.   and rectal: Deferred.  Extremities: No cyanosis clubbing edema.  Neurologic:                 Somewhat confused and encephalopathic.  Moves all extremities and withdraws to noxious stimuli    Chest X-Ray: No film today.  Previous film was entirely normal with slightly hyperinflated lung fields    INFUSIONS    lactated ringers 100 mL/hr Last Rate: 100 mL/hr (07/20/19 1027)   pantoprazole 8 mg/hr Last Rate: 8 mg/hr (07/20/19 1640)       Results from last 7 days   Lab Units 07/20/19  0611 07/19/19  2357   WBC 10*3/mm3 8.07 10.31   HEMOGLOBIN g/dL 9.1* 9.5*   HEMATOCRIT % 29.0* 30.9*   PLATELETS 10*3/mm3 281 361     Results from last 7 days   Lab Units 07/20/19  0611 07/19/19  2357   SODIUM mmol/L 141 140   POTASSIUM mmol/L 4.4 4.2   CHLORIDE mmol/L 108* 100   CO2 mmol/L 26.0 31.0*   BUN mg/dL 34* 32*   CREATININE mg/dL 0.94 1.00   GLUCOSE mg/dL 107* 178*   CALCIUM mg/dL 8.0* 9.0         Results from last 7 days   Lab Units 07/19/19  2357   ALK PHOS U/L 78   BILIRUBIN mg/dL 0.3   ALT (SGPT) U/L 100*   AST (SGOT) U/L 114*       No results found for: SEDRATE  No results found for: BNP  Lab Results   Component Value Date    CKTOTAL 207 (H) 07/02/2019    TROPONINT <0.010 07/19/2019     Lab Results   Component Value Date    TSH 0.395 07/20/2019     No results found for: LACTATE  No results found for: CORTISOL      I reviewed the patient's results, images and medication.    Assessment/Plan   ASSESSMENT        Large penetrating prepyloric gastric ulcer. Clip placed 7/20/2019 (CMS/[facility])    Acute blood loss anemia     Hepatitis C antibody test positive    Smoker      DISCUSSION: Still receiving volume resuscitation.  Hemoglobin/hematocrit unchanged for the last 6 hours and no further evidence of active bleeding.  We will continue to follow hematocrits every 6 hours and transfuse if necessary.  If rebleeds, I have talked with Dr. Cortés and he can address this up until early tomorrow morning.  Situation discussed with both Dr. William and Dr. Cortés.    PLAN     1.  Started on 100 cc an hour of LR  2.  Follow-up hematocrit every 6 hours  3.  Will contact Dr. Cortés if bleeds again to consider embolization (Dr. Cortés however will be unavailable from 6 AM Sunday till 6 AM Monday.)  4.  Dr. William is a aware of the situation and is available should he have a massive bleed and require hemigastrectomy etc.  5.  Smoking cessation  6.  Thiamine x3 days  7.  Nebulized bronchodilators  8.  Mobilization    Plan of care and goals reviewed with mulitdisciplinary team at daily rounds.    I discussed the patient's findings and my recommendations with patient, family, nursing staff and consulting provider    High level of risk due to: severe exacerbation of chronic illness, illness with threat to life or bodily function and parenteral controlled substances.    Time spent Critical care 35 min (It does not include procedure time).    Anam Lopez MD  Intensive Care Medicine  07/20/19 4:44 PM

## 2019-07-20 NOTE — H&P
Intensive Care Admission Note     Acute gastric ulcer with hemorrhage and perforation (CMS/HCC)    History of Present Illness     This is a 59-year-old current smoker who apparently has a history of gastroesophageal reflux disease and possibly ulcers in the past who presented to the emergency department early this morning with complaints of multiple episodes of hematemesis. This was associated with nausea as well as dizziness.  Initially he had been hypotensive in the 80s systolic however received 2 L of fluid and is currently having transfusion of 2 units of packed red blood cells and his blood pressure has improved into the 100 systolic range.  Apparently he has had multiple visits to [facility] for the past several weeks secondary to nausea and vomiting though not hemoptysis.  There has been no report of melena or hematochezia.  He has denied any fevers or chills.  He has denied weight loss.    CT angiography of the abdomen was performed which was read as having findings consistent with possible gastric ulcer with contained perforation.  No contrast extravasation into the stomach was noted.    Dr. Olmedo was initially contacted by the ER physician Dr. Brown and it was advised that the patient have an NG tube placed and be admitted to the intensive care unit with gastroenterology consultation in the morning.     Upon arrival to the intensive care unit he is having difficulty speaking due to a dry mouth and the NG tube.  He is complaining of sharp abdominal pain and he continues to have nausea.  He is asking for something for sleep.     Problem List, Surgical History, Family, Social History, and ROS     Patient Active Problem List    Diagnosis   • *Acute gastric ulcer with hemorrhage and perforation (CMS/HCC) [K25.2]   • Smoker [F17.200]   • Anemia, blood loss [D50.0]     Past Surgical History:   Procedure Laterality Date   • BACK SURGERY         Allergies   Allergen Reactions   • Penicillins Unknown  "(See Comments)     Pt unable to verify.     No current facility-administered medications on file prior to encounter.      No current outpatient medications on file prior to encounter.     MEDICATION LIST AND ALLERGIES REVIEWED.    No family history on file.  Social History     Tobacco Use   • Smoking status: Current Every Day Smoker     Packs/day: 1.50     Types: Cigarettes   Substance Use Topics   • Alcohol use: No     Frequency: Never   • Drug use: No       Review of Systems  A full review of systems has been completed and it is negative except as expressly mentioned in the HPI.    Physical Exam and Clinical Information   /66   Pulse 95   Temp 98.6 °F (37 °C)   Resp 18   Ht 182.9 cm (72.01\")   Wt 79.4 kg (175 lb 0.7 oz)   SpO2 98%   BMI 23.74 kg/m²   Physical Exam   Constitutional: He is oriented to person, place, and time.   Thin well-developed man who is very uncomfortable appearing   HENT:   Mouth/Throat: No oropharyngeal exudate.   NG in place.  There is dark red blood in the tube.  Dry mucous membranes.   Eyes: Conjunctivae and EOM are normal. Pupils are equal, round, and reactive to light. No scleral icterus.   Sclera are nonicteric   Neck: Trachea normal and normal range of motion. Neck supple. No JVD present. Carotid bruit is not present. No tracheal deviation present. No thyroid mass and no thyromegaly present.   Cardiovascular: Normal rate, regular rhythm and normal heart sounds. Exam reveals no friction rub.   No murmur heard.  Pulmonary/Chest: Effort normal and breath sounds normal. No stridor. No respiratory distress. He has no wheezes. He has no rales. He exhibits no tenderness.   Abdominal: Soft. Bowel sounds are normal. He exhibits no distension.   There is diffuse tenderness however this is focused on the epigastrium.  There is no rebound.  There is voluntary guarding.   Musculoskeletal: Normal range of motion. He exhibits no edema or deformity.   Lymphadenopathy:     He has no " cervical adenopathy.   Neurological: He is alert and oriented to person, place, and time.   Skin: Skin is warm. Capillary refill takes less than 2 seconds. No erythema. No pallor.   Psychiatric: He has a normal mood and affect. His behavior is normal.       Results from last 7 days   Lab Units 07/19/19  2357   WBC 10*3/mm3 10.31   HEMOGLOBIN g/dL 9.5*   PLATELETS 10*3/mm3 361     Results from last 7 days   Lab Units 07/19/19  2357   SODIUM mmol/L 140   POTASSIUM mmol/L 4.2   CO2 mmol/L 31.0*   BUN mg/dL 32*   CREATININE mg/dL 1.00   GLUCOSE mg/dL 178*     Estimated Creatinine Clearance: 89.3 mL/min (by C-G formula based on SCr of 1 mg/dL).      I reviewed the patient's results and images.     Impression     Hospital Problem List     * (Principal) Acute gastric ulcer with hemorrhage and perforation (CMS/[facility])    Smoker    Anemia, blood loss        Plan/Recommendations     The patient will be admitted to the intensive care unit.  Continue with Protonix drip.  I will place a consultation with gastroenterology tomorrow as was suggested for further work-up.  Antiemesis as needed.  I will give the patient 100 mL/h of Ringer's lactate solution.  Follow-up labs and orders been placed for tomorrow.  2 units of blood are currently infusing.  Patient remains high risk for worsening secondary to gastrointestinal bleeding and possible perforation of the gastric ulcer.    High level of risk due to:  severe exacerbation of chronic illness and illness with threat to life or bodily function.        Chris Patel MD, [facility]  Pulmonary and Critical Care Medicine  07/20/19 2:22 AM     CC: Provider, No Known

## 2019-07-20 NOTE — CONSULTS
"[facility]    Referring Provider: No ref. provider found    Primary Care Provider: Provider, No Known    Reason for Consultation: GI bleed/perforation-contained    Chief complaint : Abdominal pain, hematemesis    History of present illness:  Jaime Connell is a 59 y.o. male who is admitted with abdominal pain hematemesis.  He started vomiting bright red blood yesterday.  He became syncopal.  No melena.  He has had abdominal pain off and on for the last several months.  He had been told he had an ulcer.  He has however not been taking a PPI or other acid blocking medication.  No NSAIDs.  He does take Tylenol.  No prior history peptic ulcer disease.  Patient is currently sleeping and history is taken from his wife.  He smokes but denies alcohol.  Allergies:  Penicillins    Scheduled Meds:    sodium chloride 3 mL Intravenous Q12H        Infusions:    lactated ringers 100 mL/hr Last Rate: 100 mL/hr (07/20/19 0249)   pantoprazole 8 mg/hr Last Rate: 8 mg/hr (07/20/19 2936)       PRN Meds:  •  HYDROmorphone  •  promethazine  •  sodium chloride  •  sodium chloride    Home Meds:  No medications prior to admission.       ROS: Review of Systems   Constitutional: Negative for unexpected weight change.   Respiratory: Negative for shortness of breath.    Cardiovascular: Negative for chest pain and leg swelling.   Gastrointestinal: Positive for abdominal pain and vomiting.   All other systems reviewed and are negative.      PAST MED HX: Pt  has a past medical history of Back pain.  PAST SURG HX: Pt  has a past surgical history that includes Back surgery.  FAM HX: family history is not on file.  SOC HX: Pt  reports that he has been smoking cigarettes.  He has been smoking about 1.50 packs per day. He does not have any smokeless tobacco history on file. He reports that he does not drink alcohol or use drugs.    /70   Pulse 76   Temp 99.6 °F (37.6 °C) (Axillary)   Resp 22   Ht 182.9 cm (72.01\")   Wt 79.4 kg " (175 lb 0.7 oz)   SpO2 92%   BMI 23.74 kg/m²     Physical Exam  Wt Readings from Last 3 Encounters:   07/19/19 79.4 kg (175 lb 0.7 oz)   07/02/19 79.4 kg (175 lb)   ,body mass index is 23.74 kg/m².    General Well developed; well nourished; no acute distress.   ENT   Oral mucosa pink & moist without thrush or lesions.    Neck Neck supple; trachea midline. No thyromegaly  Resp CTA; no rhonchi, rales, or wheezes.  Respiration effort normal  CV RRR; ; no M/R/G. No lower extremity edema  GI Abd soft, mild diffuse tenderness, ND, normal active bowel sounds.  No HSM.  No abd hernia  Skin No rash; no lesions; no bruises.  Skin turgor normal  Musc No clubbing; no cyanosis.    Psych patient sleeping      Results Review:   I reviewed the patient's new clinical results.  I reviewed the patient's new imaging results and agree with the interpretation.    Lab Results   Component Value Date    WBC 8.07 07/20/2019    HGB 9.1 (L) 07/20/2019    HCT 29.0 (L) 07/20/2019    MCV 90.3 07/20/2019     07/20/2019       Lab Results   Component Value Date    GLUCOSE 107 (H) 07/20/2019    BUN 34 (H) 07/20/2019    CREATININE 0.94 07/20/2019    EGFRIFNONA 82 07/20/2019    BCR 36.2 (H) 07/20/2019    CO2 26.0 07/20/2019    CALCIUM 8.0 (L) 07/20/2019    ALBUMIN 3.50 07/19/2019     (H) 07/19/2019     (H) 07/19/2019   CT scan and pelvis per radiologist report:  IMPRESSION:     1. Focal area of thinning/irregularity involving the posterior superior aspect of the gastric wall in the region of the distal body/antrum which appears to communicate with an ill-defined collection of extraluminal fluid and gas in the retroperitoneum  between the gastric antrum and pancreatic head, measuring approximately 2 x 2.2 cm. This is highly concerning for a contained perforated gastric ulcer. Hyperdense material noted throughout the stomach, suggestive of hemorrhage. No active contrast  extravasation seen within the stomach or abdomen.  2.  Abdominal aorta normal in course and caliber. No dissection. Mesenteric vessels and renal arteries appear patent.  3. Appendix not clearly visualized. No pericecal inflammation.  4. Moderate stool burden.    ASSESSMENTS/PLANS    1.  Hematemesis  2.  Gastric ulcer CT scan with contained perforation  3.  Elevated liver function test      Currently patient stable after being transfused receiving blood.  Plan EGD for this a.m.  Discussed increased risk of worsening perforation with his wife.    Suspect the LFTs may be related to his hypotension.  Will continue to follow.    I discussed the patients findings and my recommendations with family    Osmani Anderson MD  07/20/19  7:39 AM

## 2019-07-20 NOTE — POST-PROCEDURE NOTE
EGD  Mild GERD and NG trauma.  Small HH.  Large penetrating  Ulcer in prepyloric area with clot and food debris.  Ovesco clip applied.   Bx antrum      REC FU path, PPI,   If rebleeds needs IR with coiling

## 2019-07-20 NOTE — PLAN OF CARE
Problem: Fall Risk (Adult)  Goal: Absence of Fall  Outcome: Ongoing (interventions implemented as appropriate)      Problem: Pain, Chronic (Adult)  Goal: Acceptable Pain/Comfort Level and Functional Ability  Outcome: Ongoing (interventions implemented as appropriate)      Problem: Patient Care Overview  Goal: Plan of Care Review  Outcome: Ongoing (interventions implemented as appropriate)   07/20/19 0608   Coping/Psychosocial   Plan of Care Reviewed With patient   Plan of Care Review   Progress no change   OTHER   Outcome Summary 2 units of PRBC transfused. Protonix gtt started & maintained. Waiting on scope. Will continue to monitor.

## 2019-07-20 NOTE — ANESTHESIA PREPROCEDURE EVALUATION
Anesthesia Evaluation     Patient summary reviewed and Nursing notes reviewed   no history of anesthetic complications:  NPO Solid Status: > 8 hours  NPO Liquid Status: > 8 hours           Airway   Mallampati: II  TM distance: >3 FB  Neck ROM: full  No difficulty expected  Dental      Pulmonary - negative pulmonary ROS and normal exam   Cardiovascular - negative cardio ROS and normal exam        Neuro/Psych- negative ROS  GI/Hepatic/Renal/Endo    (+)  PUD, GI bleeding,     Musculoskeletal     (+) back pain,   Abdominal    Substance History      OB/GYN          Other                        Anesthesia Plan    ASA 2     general     intravenous induction   Anesthetic plan, all risks, benefits, and alternatives have been provided, discussed and informed consent has been obtained with: patient.    Plan discussed with CRNA.

## 2019-07-20 NOTE — PAYOR COMM NOTE
"Jaime Figueredo (59 y.o. Male)     Date of Birth Social Security Number Address Home Phone MRN    1960  [facility] [place] [address] [phone] [id number]    Hindu Marital Status          None Single       Admission Date Admission Type Admitting Provider Attending Provider Department, Room/Bed    7/19/19 Emergency Chris Patel MD McIntosh, Matthew M, MD [facility] 2B ICU, N232/1    Discharge Date Discharge Disposition Discharge Destination                       Attending Provider:  Chris Patel MD    Allergies:  Penicillins    Isolation:  None   Infection:  None   Code Status:  CPR    Ht:  182.9 cm (72.01\")   Wt:  79.4 kg (175 lb 0.7 oz)    Admission Cmt:  None   Principal Problem:  Acute gastric ulcer with hemorrhage and perforation (CMS/HCC) [K25.2]                 Active Insurance as of 7/19/2019     Primary Coverage     Payor Plan Insurance Group Employer/Plan Group    MEDICARE MEDICARE A & B      Payor Plan Address Payor Plan Phone Number Payor Plan Fax Number Effective Dates    [address] [phone]  3/1/2002 - None Entered    [facility] [id number]       Subscriber Name Subscriber Birth Date Member ID       JAIME FIGUEREDO 1960 [id number]           Secondary Coverage     Payor Plan Insurance Group Employer/Plan Group    [employer] [place] WELL[place]ARE MEDICAID      Payor Plan Address Payor Plan Phone Number Payor Plan Fax Number Effective Dates    [address] [address] [phone]  4/11/2019 - None Entered    [facility] [id number]       Subscriber Name Subscriber Birth Date Member ID       JAIME FIGUEREDO 1960 [id number]                 Emergency Contacts      (Rel.) Home Phone Work Phone Mobile Phone    DOMINIC HOLLIS (Other) -- -- [phone]               History & Physical      Chris Patel MD at 7/20/2019  2:22 AM            Intensive Care Admission Note     Acute gastric ulcer with hemorrhage and perforation (CMS/HCC)    History " of Present Illness     This is a 59-year-old current smoker who apparently has a history of gastroesophageal reflux disease and possibly ulcers in the past who presented to the emergency department early this morning with complaints of multiple episodes of hematemesis. This was associated with nausea as well as dizziness.  Initially he had been hypotensive in the 80s systolic however received 2 L of fluid and is currently having transfusion of 2 units of packed red blood cells and his blood pressure has improved into the 100 systolic range.  Apparently he has had multiple visits to [facility] for the past several weeks secondary to nausea and vomiting though not hemoptysis.  There has been no report of melena or hematochezia.  He has denied any fevers or chills.  He has denied weight loss.    CT angiography of the abdomen was performed which was read as having findings consistent with possible gastric ulcer with contained perforation.  No contrast extravasation into the stomach was noted.    Dr. Olmedo was initially contacted by the ER physician Dr. Brown and it was advised that the patient have an NG tube placed and be admitted to the intensive care unit with gastroenterology consultation in the morning.     Upon arrival to the intensive care unit he is having difficulty speaking due to a dry mouth and the NG tube.  He is complaining of sharp abdominal pain and he continues to have nausea.  He is asking for something for sleep.     Problem List, Surgical History, Family, Social History, and ROS     Patient Active Problem List    Diagnosis   • *Acute gastric ulcer with hemorrhage and perforation (CMS/HCC) [K25.2]   • Smoker [F17.200]   • Anemia, blood loss [D50.0]     Past Surgical History:   Procedure Laterality Date   • BACK SURGERY         Allergies   Allergen Reactions   • Penicillins Unknown (See Comments)     Pt unable to verify.     No current facility-administered medications on file prior to  "encounter.      No current outpatient medications on file prior to encounter.     MEDICATION LIST AND ALLERGIES REVIEWED.    No family history on file.  Social History     Tobacco Use   • Smoking status: Current Every Day Smoker     Packs/day: 1.50     Types: Cigarettes   Substance Use Topics   • Alcohol use: No     Frequency: Never   • Drug use: No       Review of Systems  A full review of systems has been completed and it is negative except as expressly mentioned in the HPI.    Physical Exam and Clinical Information   /66   Pulse 95   Temp 98.6 °F (37 °C)   Resp 18   Ht 182.9 cm (72.01\")   Wt 79.4 kg (175 lb 0.7 oz)   SpO2 98%   BMI 23.74 kg/m²    Physical Exam   Constitutional: He is oriented to person, place, and time.   Thin well-developed man who is very uncomfortable appearing   HENT:   Mouth/Throat: No oropharyngeal exudate.   NG in place.  There is dark red blood in the tube.  Dry mucous membranes.   Eyes: Conjunctivae and EOM are normal. Pupils are equal, round, and reactive to light. No scleral icterus.   Sclera are nonicteric   Neck: Trachea normal and normal range of motion. Neck supple. No JVD present. Carotid bruit is not present. No tracheal deviation present. No thyroid mass and no thyromegaly present.   Cardiovascular: Normal rate, regular rhythm and normal heart sounds. Exam reveals no friction rub.   No murmur heard.  Pulmonary/Chest: Effort normal and breath sounds normal. No stridor. No respiratory distress. He has no wheezes. He has no rales. He exhibits no tenderness.   Abdominal: Soft. Bowel sounds are normal. He exhibits no distension.   There is diffuse tenderness however this is focused on the epigastrium.  There is no rebound.  There is voluntary guarding.   Musculoskeletal: Normal range of motion. He exhibits no edema or deformity.   Lymphadenopathy:     He has no cervical adenopathy.   Neurological: He is alert and oriented to person, place, and time.   Skin: Skin is warm. " Capillary refill takes less than 2 seconds. No erythema. No pallor.   Psychiatric: He has a normal mood and affect. His behavior is normal.       Results from last 7 days   Lab Units 07/19/19  2357   WBC 10*3/mm3 10.31   HEMOGLOBIN g/dL 9.5*   PLATELETS 10*3/mm3 361     Results from last 7 days   Lab Units 07/19/19  2357   SODIUM mmol/L 140   POTASSIUM mmol/L 4.2   CO2 mmol/L 31.0*   BUN mg/dL 32*   CREATININE mg/dL 1.00   GLUCOSE mg/dL 178*     Estimated Creatinine Clearance: 89.3 mL/min (by C-G formula based on SCr of 1 mg/dL).      I reviewed the patient's results and images.     Impression     Hospital Problem List     * (Principal) Acute gastric ulcer with hemorrhage and perforation (CMS/HCC)    Smoker    Anemia, blood loss        Plan/Recommendations     The patient will be admitted to the intensive care unit.  Continue with Protonix drip.  I will place a consultation with gastroenterology tomorrow as was suggested for further work-up.  Antiemesis as needed.  I will give the patient 100 mL/h of Ringer's lactate solution.  Follow-up labs and orders been placed for tomorrow.  2 units of blood are currently infusing.  Patient remains high risk for worsening secondary to gastrointestinal bleeding and possible perforation of the gastric ulcer.    High level of risk due to:  severe exacerbation of chronic illness and illness with threat to life or bodily function.        Chris Patel MD, [facility]  Pulmonary and Critical Care Medicine  07/20/19 2:22 AM     CC: Provider, No Known    Electronically signed by Chris Patel MD at 7/20/2019  3:17 AM

## 2019-07-20 NOTE — ANESTHESIA POSTPROCEDURE EVALUATION
Patient: Jaime ROD Connell    Procedure Summary     Date:  07/20/19 Room / Location:   [facility]    Anesthesia Start:  0851 Anesthesia Stop:  0932    Procedure:  ESOPHAGOGASTRODUODENOSCOPY (N/A ) Diagnosis:       Acute gastric ulcer with hemorrhage and perforation (CMS/HCC)      (Acute gastric ulcer with hemorrhage and perforation (CMS/HCC) [K25.2])    Surgeon:  Osmani Anderson MD Provider:  Nish Cadena MD    Anesthesia Type:  general ASA Status:  2          Anesthesia Type: general  Last vitals  BP   105/70 (07/20/19 0630)   Temp   99.6 °F (37.6 °C) (07/20/19 0515)   Pulse   76 (07/20/19 0630)   Resp   22 (07/20/19 0600)     SpO2   92 % (07/20/19 0630)     Post Anesthesia Care and Evaluation    Patient location during evaluation: PACU  Patient participation: complete - patient participated  Level of consciousness: awake and alert  Pain score: 0  Pain management: adequate  Airway patency: patent  Anesthetic complications: No anesthetic complications  PONV Status: none  Cardiovascular status: hemodynamically stable and acceptable  Respiratory status: nonlabored ventilation, acceptable and nasal cannula  Hydration status: acceptable

## 2019-07-20 NOTE — PROGRESS NOTES
"                  Clinical Nutrition     Nutrition Assessment  Reason for Visit:   MST score 2+, no indicators present, unsure weight loss       Patient Name: Jaime Connell  YOB: 1960  MRN: [id number]  Date of Encounter: 07/20/19 10:14 AM  Admission date: 7/19/2019        Nutrition Assessment   Assessment       Admission diagnosis  Acute gastric ulcer with hemorrhage and perforation (CMS/HCC)    Additional applicable diagnosis/conditions/procedures this adm:  (7/20) S/p EGD:  Mild GERD and NG trauma.  Small HH.  Large penetrating ulcer in prepyloric area            with clot and food debris.  Ovesco clip applied.   Anemia, blood loss  Hep C reactive     Applicable PMH/PSxH:  Smoker   GERD    Reported/Observed/Food/Nutrition Related History:      RD notes H and P completed by Intensivist 7/20 includes pt denied weight loss.       Anthropometrics     Height: 182.9 cm (72.01\")  Last filed wt: Weight: 79.4 kg (175 lb 0.7 oz) (07/19/19 2300)   Method: none listed     BMI: BMI (Calculated): 23.7  Normal: 18.5-24.9kg/m2    Ideal Body Weight (IBW) (kg): 82.09      Labs reviewed     Results from last 7 days   Lab Units 07/20/19  0611 07/19/19  2357   GLUCOSE mg/dL 107* 178*   BUN mg/dL 34* 32*   CREATININE mg/dL 0.94 1.00   SODIUM mmol/L 141 140   CHLORIDE mmol/L 108* 100   POTASSIUM mmol/L 4.4 4.2   ALT (SGPT) U/L  --  100*     Results from last 7 days   Lab Units 07/19/19  2357   ALBUMIN g/dL 3.50             Lab Results   Lab Value Date/Time    HGBA1C 5.90 (H) 07/20/2019 0611         Medications reviewed   Pertinent:  GTT:Prontonix, LR@100mL/hr      Intake/Ouptut 24 hrs (7:00AM - 6:59 AM)     Intake & Output (last day)       07/19 0701 - 07/20 0700 07/20 0701 - 07/21 0700    I.V. (mL/kg) 407 (5.1)     Blood 579     IV Piggyback 2000     Total Intake(mL/kg) 2986 (37.6)     Net +2986                 Current Nutrition Prescription     PO: Diet Clear Liquid    Nutrition Supplements:       Dietary Nutrition " Supplements Boost Breeze (Clear Liquid) 4 times daily; (BLD, HS)    Intake: insuff data            Nutrition Diagnosis     7/20  Problem Predicted suboptimal energy intake   Etiology Clinical status/diet order   Signs/Symptoms Pt currently on clear liquids        Nutrition Intervention   1.  Follow treatment progress, Care plan reviewed  2. Clear liquid ONS entered by GI Physician today  3. Additional nutrition intervention recommendations per clinical status and PO intake adequacy trend      Goal:   General: Nutrition support treatment  PO: Advace diet as medically feasible/appropriate      Monitoring/Evaluation:   Per protocol, PO intake, Supplement intake, GI status      Will Continue to follow per protocol      Sasha Darling RDN, LD  Time Spent: 25min

## 2019-07-20 NOTE — ED PROVIDER NOTES
Subjective   Mr. Jaime Connell is a 59 y.o. male who presents to the ED with complaints of hematemesis. He reports that he was feeling fine earlier today but around 2000 tonight he began feeling nauseous, hot, and dizzy before suddenly having several episodes of bright red hematemesis. He indicates that he has never had hematemesis in the past. He denies any hematochezia, or melena. He is not currently on any blood thinners. He indicates that he has been seen at the [facility] ED multiple times in the past 3-4 months for nausea and vomiting related to stomach ulcers. He has a history of an appendectomy. He has no history of GERD, hepatitis, or cirrhosis. No other acute complaints at this time.         History provided by:  Patient  Vomiting   The primary symptoms include nausea, vomiting and hematemesis. Primary symptoms do not include melena or hematochezia. The illness began today. The onset was sudden. The problem has not changed since onset.  The hematemesis is associated with dizziness.       Review of Systems   Gastrointestinal: Positive for hematemesis, nausea and vomiting. Negative for hematochezia and melena.   Neurological: Positive for dizziness.   All other systems reviewed and are negative.      Past Medical History:   Diagnosis Date   • Back pain        Allergies   Allergen Reactions   • Penicillins Unknown (See Comments)     Pt unable to verify.       Past Surgical History:   Procedure Laterality Date   • BACK SURGERY         No family history on file.    Social History     Socioeconomic History   • Marital status: Single     Spouse name: Not on file   • Number of children: Not on file   • Years of education: Not on file   • Highest education level: Not on file   Tobacco Use   • Smoking status: Current Every Day Smoker     Packs/day: 1.50     Types: Cigarettes   Substance and Sexual Activity   • Alcohol use: No     Frequency: Never   • Drug use: No         Objective   Physical Exam   Constitutional: He  is oriented to person, place, and time. He appears well-developed and well-nourished. No distress.   HENT:   Head: Normocephalic and atraumatic.   Nose: Nose normal.   Patient's tongue appears to be stained black due to recent hematemesis.    Eyes: No scleral icterus.   Pale conjunctivae.    Neck: Normal range of motion. Neck supple.   Cardiovascular: Regular rhythm and normal heart sounds. Tachycardia present.   No murmur heard.  Pulmonary/Chest: Effort normal and breath sounds normal. No respiratory distress.   Abdominal: Soft. Bowel sounds are normal. There is no tenderness.   Patient has moderate tenderness to palpation of his epigastric region. The remainder of his abdomen is otherwise soft with normal bowel sounds.   Musculoskeletal: Normal range of motion.   Neurological: He is alert and oriented to person, place, and time.   Skin: Skin is warm and dry. He is not diaphoretic.   Psychiatric: He has a normal mood and affect. His behavior is normal.   Nursing note and vitals reviewed.      Critical Care  Performed by: Vandana Brown MD  Authorized by: Vandana Brown MD     Critical care provider statement:     Critical care time (minutes):  45    Critical care time was exclusive of:  Separately billable procedures and treating other patients    Critical care was necessary to treat or prevent imminent or life-threatening deterioration of the following conditions:  Circulatory failure    Critical care was time spent personally by me on the following activities:  Blood draw for specimens, development of treatment plan with patient or surrogate, discussions with consultants, evaluation of patient's response to treatment, examination of patient, obtaining history from patient or surrogate, ordering and performing treatments and interventions, ordering and review of laboratory studies, ordering and review of radiographic studies, pulse oximetry, re-evaluation of patient's condition and review of old  charts             ED Course       Recent Results (from the past 24 hour(s))   Comprehensive Metabolic Panel    Collection Time: 07/19/19 11:57 PM   Result Value Ref Range    Glucose 178 (H) 65 - 99 mg/dL    BUN 32 (H) 6 - 20 mg/dL    Creatinine 1.00 0.76 - 1.27 mg/dL    Sodium 140 136 - 145 mmol/L    Potassium 4.2 3.5 - 5.2 mmol/L    Chloride 100 98 - 107 mmol/L    CO2 31.0 (H) 22.0 - 29.0 mmol/L    Calcium 9.0 8.6 - 10.5 mg/dL    Total Protein 6.5 6.0 - 8.5 g/dL    Albumin 3.50 3.50 - 5.20 g/dL    ALT (SGPT) 100 (H) 1 - 41 U/L    AST (SGOT) 114 (H) 1 - 40 U/L    Alkaline Phosphatase 78 39 - 117 U/L    Total Bilirubin 0.3 0.2 - 1.2 mg/dL    eGFR Non African Amer 76 >60 mL/min/1.73    Globulin 3.0 gm/dL    A/G Ratio 1.2 g/dL    BUN/Creatinine Ratio 32.0 (H) 7.0 - 25.0    Anion Gap 9.0 5.0 - 15.0 mmol/L   BNP    Collection Time: 07/19/19 11:57 PM   Result Value Ref Range    proBNP 171.8 5.0 - 900.0 pg/mL   Troponin    Collection Time: 07/19/19 11:57 PM   Result Value Ref Range    Troponin T <0.010 0.000 - 0.030 ng/mL   Light Blue Top    Collection Time: 07/19/19 11:57 PM   Result Value Ref Range    Extra Tube hold for add-on    Green Top (Gel)    Collection Time: 07/19/19 11:57 PM   Result Value Ref Range    Extra Tube Hold for add-ons.    Lavender Top    Collection Time: 07/19/19 11:57 PM   Result Value Ref Range    Extra Tube hold for add-on    Gold Top - SST    Collection Time: 07/19/19 11:57 PM   Result Value Ref Range    Extra Tube Hold for add-ons.    CBC Auto Differential    Collection Time: 07/19/19 11:57 PM   Result Value Ref Range    WBC 10.31 3.40 - 10.80 10*3/mm3    RBC 3.38 (L) 4.14 - 5.80 10*6/mm3    Hemoglobin 9.5 (L) 13.0 - 17.7 g/dL    Hematocrit 30.9 (L) 37.5 - 51.0 %    MCV 91.4 79.0 - 97.0 fL    MCH 28.1 26.6 - 33.0 pg    MCHC 30.7 (L) 31.5 - 35.7 g/dL    RDW 15.5 (H) 12.3 - 15.4 %    RDW-SD 52.0 37.0 - 54.0 fl    MPV 9.9 6.0 - 12.0 fL    Platelets 361 140 - 450 10*3/mm3    Neutrophil % 63.7 42.7  - 76.0 %    Lymphocyte % 24.4 19.6 - 45.3 %    Monocyte % 8.1 5.0 - 12.0 %    Eosinophil % 2.5 0.3 - 6.2 %    Basophil % 0.5 0.0 - 1.5 %    Immature Grans % 0.8 (H) 0.0 - 0.5 %    Neutrophils, Absolute 6.56 1.70 - 7.00 10*3/mm3    Lymphocytes, Absolute 2.52 0.70 - 3.10 10*3/mm3    Monocytes, Absolute 0.84 0.10 - 0.90 10*3/mm3    Eosinophils, Absolute 0.26 0.00 - 0.40 10*3/mm3    Basophils, Absolute 0.05 0.00 - 0.20 10*3/mm3    Immature Grans, Absolute 0.08 (H) 0.00 - 0.05 10*3/mm3    nRBC 0.0 0.0 - 0.2 /100 WBC   Type & Screen    Collection Time: 07/19/19 11:57 PM   Result Value Ref Range    ABO Type O     RH type Positive     Antibody Screen Negative     T&S Expiration Date 7/22/2019 11:59:59 PM    Procalcitonin    Collection Time: 07/19/19 11:57 PM   Result Value Ref Range    Procalcitonin 0.12 0.10 - 0.25 ng/mL   Prepare RBC, 2 Units    Collection Time: 07/20/19  1:22 AM   Result Value Ref Range    Product Code S9026V88     Unit Number [id number]     UNIT  ABO O     UNIT  RH POS     Crossmatch Interpretation Compatible     Dispense Status IS     Blood Type OPOS     Blood Expiration Date 467816156098     Blood Type Barcode 5100     Product Code U3907N43     Unit Number [id number]     UNIT  ABO O     UNIT  RH POS     Crossmatch Interpretation Compatible     Dispense Status XM     Blood Type OPOS     Blood Expiration Date 201908162359     Blood Type Barcode 5100      Note: In addition to lab results from this visit, the labs listed above may include labs taken at another facility or during a different encounter within the last 24 hours. Please correlate lab times with ED admission and discharge times for further clarification of the services performed during this visit.    CT Angiogram Abdomen Pelvis With & Without Contrast   Final Result      1. Focal area of thinning/irregularity involving the posterior superior aspect of the gastric wall in the region of the distal body/antrum which appears to  communicate with an ill-defined collection of extraluminal fluid and gas in the retroperitoneum   between the gastric antrum and pancreatic head, measuring approximately 2 x 2.2 cm. This is highly concerning for a contained perforated gastric ulcer. Hyperdense material noted throughout the stomach, suggestive of hemorrhage. No active contrast   extravasation seen within the stomach or abdomen.   2. Abdominal aorta normal in course and caliber. No dissection. Mesenteric vessels and renal arteries appear patent.   3. Appendix not clearly visualized. No pericecal inflammation.   4. Moderate stool burden.      NOTIFICATION: Critical Value/emergent results were called by telephone at the time of interpretation on 7/20/2019 1:28 AM to Vandana Brown MD who verbally acknowledged these results.      Signer Name: Reynaldo Church MD    Signed: 7/20/2019 1:31 AM    Workstation Name: [id number]          XR Chest 1 View   Final Result   No acute cardiopulmonary findings.      Signer Name: Reynaldo Church MD    Signed: 7/20/2019 12:44 AM    Workstation Name: [id number]          XR Abdomen KUB    (Results Pending)     Vitals:    07/20/19 0030 07/20/19 0133 07/20/19 0148 07/20/19 0200   BP: 94/58 105/65 91/71 113/66   Pulse: 90 71 84 95   Resp:  20 18    Temp:  98.7 °F (37.1 °C) 98.6 °F (37 °C)    TempSrc:  Oral     SpO2: 100% 100% 98% 98%   Weight:       Height:         Medications   sodium chloride 0.9 % flush 10 mL (not administered)   pantoprazole (PROTONIX) 40 mg in sodium chloride 0.9 % 100 mL (0.4 mg/mL) infusion (8 mg/hr Intravenous New Bag 7/20/19 0032)   sodium chloride 0.9 % bolus 2,000 mL (0 mL Intravenous Stopped 7/20/19 0141)   pantoprazole (PROTONIX) injection 80 mg (80 mg Intravenous Given 7/20/19 0025)   iopamidol (ISOVUE-370) 76 % injection 100 mL (99 mL Intravenous Given 7/20/19 0052)     ECG/EMG Results (last 24 hours)     Procedure Component Value Units Date/Time    ECG 12 Lead [[id number]] Collected:   07/19/19 2345     Updated:  07/19/19 2346        ECG 12 Lead                           MDM  Number of Diagnoses or Management Options  Acute gastric ulcer with hemorrhage and perforation (CMS/HCC): new and requires workup  Hematemesis with nausea: new and requires workup  Hypotension due to blood loss: new and requires workup  Nausea and vomiting in adult: new and requires workup  Diagnosis management comments: Patient presents with hematemesis epigastric abdominal pain and hypotension.    He reports recently receive an ER evaluation for suspected GERD.    He denies any history of alcoholism, no previous surgeries to the abdomen.  No anticoagulation use.    Patient's CT scan showed perforated gastric ulcer but no active extravasation of contrast into the stomach lumen.    The patient was given 2 L of fluid and 2 units of packed red blood cells.  The blood pressure did improve to greater than 100 systolic.    Patient was also given Protonix bolus and Protonix drip initiated.    I discussed the patient with the surgeon, Dr. William, who suggest NG tube placement, admission, and GI consultation.    I discussed the patient with the intensivist, Dr. Patel, who will admit.       Amount and/or Complexity of Data Reviewed  Clinical lab tests: ordered and reviewed  Tests in the radiology section of CPT®: ordered and reviewed  Decide to obtain previous medical records or to obtain history from someone other than the patient: yes  Obtain history from someone other than the patient: yes  Review and summarize past medical records: yes  Discuss the patient with other providers: yes  Independent visualization of images, tracings, or specimens: yes    Risk of Complications, Morbidity, and/or Mortality  Presenting problems: high  Diagnostic procedures: high  Management options: high    Critical Care  Total time providing critical care: 30-74 minutes    Patient Progress  Patient progress: stable      Final diagnoses:   Acute  gastric ulcer with hemorrhage and perforation (CMS/HCC)   Hematemesis with nausea   Hypotension due to blood loss   Nausea and vomiting in adult       Documentation assistance provided by dean Saleh.  Information recorded by the thomasibmaki was done at my direction and has been verified and validated by me.     Mindi Saleh  07/20/19 0010       Vandana Brown MD  07/20/19 0211

## 2019-07-20 NOTE — ANESTHESIA PROCEDURE NOTES
Airway  Urgency: elective    Airway not difficult    General Information and Staff    Patient location during procedure: OR    Indications and Patient Condition  Indications for airway management: airway protection    Preoxygenated: yes  MILS not maintained throughout  Mask difficulty assessment: 1 - vent by mask    Final Airway Details  Final airway type: endotracheal airway      Successful airway: ETT  Cuffed: yes   Successful intubation technique: direct laryngoscopy  Endotracheal tube insertion site: oral  Blade: Jodee  Blade size: 3  ETT size (mm): 7.0  Cormack-Lehane Classification: grade I - full view of glottis  Placement verified by: chest auscultation and capnometry   Measured from: lips  ETT to lips (cm): 20  Number of attempts at approach: 1    Additional Comments  Negative epigastric sounds, Breath sound equal bilaterally with symmetric chest rise and fall

## 2019-07-21 LAB
ABO + RH BLD: NORMAL
ABO + RH BLD: NORMAL
ALBUMIN SERPL-MCNC: 2.6 G/DL (ref 3.5–5.2)
ALBUMIN/GLOB SERPL: 1 G/DL
ALP SERPL-CCNC: 61 U/L (ref 39–117)
ALT SERPL W P-5'-P-CCNC: 86 U/L (ref 1–41)
ANION GAP SERPL CALCULATED.3IONS-SCNC: 7 MMOL/L (ref 5–15)
AST SERPL-CCNC: 87 U/L (ref 1–40)
BASOPHILS # BLD AUTO: 0.03 10*3/MM3 (ref 0–0.2)
BASOPHILS NFR BLD AUTO: 0.4 % (ref 0–1.5)
BH BB BLOOD EXPIRATION DATE: NORMAL
BH BB BLOOD EXPIRATION DATE: NORMAL
BH BB BLOOD TYPE BARCODE: 5100
BH BB BLOOD TYPE BARCODE: 5100
BH BB DISPENSE STATUS: NORMAL
BH BB DISPENSE STATUS: NORMAL
BH BB PRODUCT CODE: NORMAL
BH BB PRODUCT CODE: NORMAL
BH BB UNIT NUMBER: NORMAL
BH BB UNIT NUMBER: NORMAL
BILIRUB SERPL-MCNC: 0.3 MG/DL (ref 0.2–1.2)
BUN BLD-MCNC: 19 MG/DL (ref 6–20)
BUN/CREAT SERPL: 21.8 (ref 7–25)
CALCIUM SPEC-SCNC: 8.5 MG/DL (ref 8.6–10.5)
CHLORIDE SERPL-SCNC: 104 MMOL/L (ref 98–107)
CO2 SERPL-SCNC: 27 MMOL/L (ref 22–29)
CREAT BLD-MCNC: 0.87 MG/DL (ref 0.76–1.27)
CROSSMATCH INTERPRETATION: NORMAL
CROSSMATCH INTERPRETATION: NORMAL
DEPRECATED RDW RBC AUTO: 53 FL (ref 37–54)
EOSINOPHIL # BLD AUTO: 0.24 10*3/MM3 (ref 0–0.4)
EOSINOPHIL NFR BLD AUTO: 3.4 % (ref 0.3–6.2)
ERYTHROCYTE [DISTWIDTH] IN BLOOD BY AUTOMATED COUNT: 15.9 % (ref 12.3–15.4)
GFR SERPL CREATININE-BSD FRML MDRD: 90 ML/MIN/1.73
GLOBULIN UR ELPH-MCNC: 2.6 GM/DL
GLUCOSE BLD-MCNC: 97 MG/DL (ref 65–99)
HCT VFR BLD AUTO: 27 % (ref 37.5–51)
HCT VFR BLD AUTO: 27.4 % (ref 37.5–51)
HCT VFR BLD AUTO: 27.4 % (ref 37.5–51)
HCT VFR BLD AUTO: 27.5 % (ref 37.5–51)
HGB BLD-MCNC: 8.4 G/DL (ref 13–17.7)
HGB BLD-MCNC: 8.7 G/DL (ref 13–17.7)
IMM GRANULOCYTES # BLD AUTO: 0.05 10*3/MM3 (ref 0–0.05)
IMM GRANULOCYTES NFR BLD AUTO: 0.7 % (ref 0–0.5)
LYMPHOCYTES # BLD AUTO: 3.04 10*3/MM3 (ref 0.7–3.1)
LYMPHOCYTES NFR BLD AUTO: 43.3 % (ref 19.6–45.3)
MAGNESIUM SERPL-MCNC: 1.7 MG/DL (ref 1.6–2.6)
MCH RBC QN AUTO: 28 PG (ref 26.6–33)
MCHC RBC AUTO-ENTMCNC: 30.7 G/DL (ref 31.5–35.7)
MCV RBC AUTO: 91.3 FL (ref 79–97)
MONOCYTES # BLD AUTO: 0.68 10*3/MM3 (ref 0.1–0.9)
MONOCYTES NFR BLD AUTO: 9.7 % (ref 5–12)
NEUTROPHILS # BLD AUTO: 2.98 10*3/MM3 (ref 1.7–7)
NEUTROPHILS NFR BLD AUTO: 42.5 % (ref 42.7–76)
NRBC BLD AUTO-RTO: 0 /100 WBC (ref 0–0.2)
PHOSPHATE SERPL-MCNC: 2.4 MG/DL (ref 2.5–4.5)
PLATELET # BLD AUTO: 258 10*3/MM3 (ref 140–450)
PMV BLD AUTO: 9.9 FL (ref 6–12)
POTASSIUM BLD-SCNC: 4.8 MMOL/L (ref 3.5–5.2)
PROT SERPL-MCNC: 5.2 G/DL (ref 6–8.5)
RBC # BLD AUTO: 3 10*6/MM3 (ref 4.14–5.8)
SODIUM BLD-SCNC: 138 MMOL/L (ref 136–145)
UNIT  ABO: NORMAL
UNIT  ABO: NORMAL
UNIT  RH: NORMAL
UNIT  RH: NORMAL
WBC NRBC COR # BLD: 7.02 10*3/MM3 (ref 3.4–10.8)

## 2019-07-21 PROCEDURE — 84100 ASSAY OF PHOSPHORUS: CPT | Performed by: INTERNAL MEDICINE

## 2019-07-21 PROCEDURE — 85018 HEMOGLOBIN: CPT | Performed by: INTERNAL MEDICINE

## 2019-07-21 PROCEDURE — 83735 ASSAY OF MAGNESIUM: CPT | Performed by: INTERNAL MEDICINE

## 2019-07-21 PROCEDURE — 94799 UNLISTED PULMONARY SVC/PX: CPT

## 2019-07-21 PROCEDURE — 85014 HEMATOCRIT: CPT | Performed by: INTERNAL MEDICINE

## 2019-07-21 PROCEDURE — 25010000002 THIAMINE PER 100 MG: Performed by: INTERNAL MEDICINE

## 2019-07-21 PROCEDURE — 85025 COMPLETE CBC W/AUTO DIFF WBC: CPT | Performed by: INTERNAL MEDICINE

## 2019-07-21 PROCEDURE — 25010000002 HYDROMORPHONE PER 4 MG: Performed by: INTERNAL MEDICINE

## 2019-07-21 PROCEDURE — 99233 SBSQ HOSP IP/OBS HIGH 50: CPT | Performed by: INTERNAL MEDICINE

## 2019-07-21 PROCEDURE — 99232 SBSQ HOSP IP/OBS MODERATE 35: CPT | Performed by: INTERNAL MEDICINE

## 2019-07-21 PROCEDURE — 80053 COMPREHEN METABOLIC PANEL: CPT | Performed by: INTERNAL MEDICINE

## 2019-07-21 RX ORDER — IPRATROPIUM BROMIDE AND ALBUTEROL SULFATE 2.5; .5 MG/3ML; MG/3ML
3 SOLUTION RESPIRATORY (INHALATION)
Status: DISCONTINUED | OUTPATIENT
Start: 2019-07-21 | End: 2019-07-23 | Stop reason: HOSPADM

## 2019-07-21 RX ORDER — FERROUS SULFATE 325(65) MG
325 TABLET ORAL
Status: DISCONTINUED | OUTPATIENT
Start: 2019-07-22 | End: 2019-07-23 | Stop reason: HOSPADM

## 2019-07-21 RX ADMIN — HYDROMORPHONE HYDROCHLORIDE 0.5 MG: 1 INJECTION, SOLUTION INTRAMUSCULAR; INTRAVENOUS; SUBCUTANEOUS at 00:28

## 2019-07-21 RX ADMIN — IPRATROPIUM BROMIDE AND ALBUTEROL SULFATE 3 ML: 2.5; .5 SOLUTION RESPIRATORY (INHALATION) at 20:30

## 2019-07-21 RX ADMIN — SODIUM CHLORIDE 8 MG/HR: 900 INJECTION INTRAVENOUS at 23:26

## 2019-07-21 RX ADMIN — SODIUM CHLORIDE, PRESERVATIVE FREE 3 ML: 5 INJECTION INTRAVENOUS at 08:07

## 2019-07-21 RX ADMIN — IPRATROPIUM BROMIDE AND ALBUTEROL SULFATE 3 ML: 2.5; .5 SOLUTION RESPIRATORY (INHALATION) at 23:47

## 2019-07-21 RX ADMIN — HYDROMORPHONE HYDROCHLORIDE 0.5 MG: 1 INJECTION, SOLUTION INTRAMUSCULAR; INTRAVENOUS; SUBCUTANEOUS at 03:05

## 2019-07-21 RX ADMIN — HYDROMORPHONE HYDROCHLORIDE 0.5 MG: 1 INJECTION, SOLUTION INTRAMUSCULAR; INTRAVENOUS; SUBCUTANEOUS at 07:18

## 2019-07-21 RX ADMIN — HYDROMORPHONE HYDROCHLORIDE 0.5 MG: 1 INJECTION, SOLUTION INTRAMUSCULAR; INTRAVENOUS; SUBCUTANEOUS at 14:29

## 2019-07-21 RX ADMIN — IPRATROPIUM BROMIDE AND ALBUTEROL SULFATE 3 ML: 2.5; .5 SOLUTION RESPIRATORY (INHALATION) at 15:41

## 2019-07-21 RX ADMIN — SODIUM CHLORIDE, PRESERVATIVE FREE 3 ML: 5 INJECTION INTRAVENOUS at 20:02

## 2019-07-21 RX ADMIN — IPRATROPIUM BROMIDE AND ALBUTEROL SULFATE 3 ML: 2.5; .5 SOLUTION RESPIRATORY (INHALATION) at 12:22

## 2019-07-21 RX ADMIN — SODIUM CHLORIDE 8 MG/HR: 900 INJECTION INTRAVENOUS at 02:15

## 2019-07-21 RX ADMIN — SODIUM CHLORIDE 8 MG/HR: 900 INJECTION INTRAVENOUS at 13:05

## 2019-07-21 RX ADMIN — IPRATROPIUM BROMIDE AND ALBUTEROL SULFATE 3 ML: 2.5; .5 SOLUTION RESPIRATORY (INHALATION) at 08:37

## 2019-07-21 RX ADMIN — THIAMINE HYDROCHLORIDE 100 MG: 100 INJECTION, SOLUTION INTRAMUSCULAR; INTRAVENOUS at 08:06

## 2019-07-21 RX ADMIN — HYDROMORPHONE HYDROCHLORIDE 0.5 MG: 1 INJECTION, SOLUTION INTRAMUSCULAR; INTRAVENOUS; SUBCUTANEOUS at 19:01

## 2019-07-21 RX ADMIN — SODIUM CHLORIDE 8 MG/HR: 900 INJECTION INTRAVENOUS at 07:12

## 2019-07-21 RX ADMIN — SODIUM CHLORIDE 8 MG/HR: 900 INJECTION INTRAVENOUS at 18:28

## 2019-07-21 RX ADMIN — HYDROMORPHONE HYDROCHLORIDE 0.5 MG: 1 INJECTION, SOLUTION INTRAMUSCULAR; INTRAVENOUS; SUBCUTANEOUS at 22:13

## 2019-07-21 RX ADMIN — POLYETHYLENE GLYCOL 3350 17 G: 17 POWDER, FOR SOLUTION ORAL at 14:33

## 2019-07-21 NOTE — PROGRESS NOTES
"General Surgery Daily Progress Note    Subjective:  No new complaints.  Has some left-sided abdominal discomfort.    Objective:  /67   Pulse 71   Temp 97 °F (36.1 °C) (Axillary)   Resp 16   Ht 182.9 cm (72.01\")   Wt 79.4 kg (175 lb 0.7 oz)   SpO2 96%   BMI 23.74 kg/m²       General Appearance: Mild distress  Eyes: Anicteric  Neck: Trachea midline   Cardiovascular:  RRR without murmur nor rub  Lungs:  Bilateral respirations unlabored   Abdomen:  Soft, no peritonitis, no masses  Extremities:  No cyanosis or edema   Skin:  No obvious rashes   Neurologic: awake and conversant       Imaging Results (last 24 hours)     ** No results found for the last 24 hours. **          CBC:  Results from last 7 days   Lab Units 07/21/19  1203 07/21/19  0629   WBC 10*3/mm3  --  7.02   HEMOGLOBIN g/dL 8.7* 8.4*  8.4*   HEMATOCRIT % 27.0* 27.4*  27.4*   PLATELETS 10*3/mm3  --  258       CMP:  Results from last 7 days   Lab Units 07/21/19  0629   SODIUM mmol/L 138   POTASSIUM mmol/L 4.8   CHLORIDE mmol/L 104   CO2 mmol/L 27.0   BUN mg/dL 19   CREATININE mg/dL 0.87   CALCIUM mg/dL 8.5*   BILIRUBIN mg/dL 0.3   ALK PHOS U/L 61   ALT (SGPT) U/L 86*   AST (SGOT) U/L 87*   GLUCOSE mg/dL 97         Assessment:  GI bleed  Gastric ulceration    Plan:   At this point he has no further evidence of ongoing hemorrhage.  He is tolerating a diet.  MiraLAX.  Minimize narcotic use.  Call with questions.    Cortes William MD - 7/21/2019, 12:19 PM        "

## 2019-07-21 NOTE — PROGRESS NOTES
Discharge Planning Assessment  [facility]     Patient Name: Jaime Connell  MRN: [id number]  Today's Date: 7/21/2019    Admit Date: 7/19/2019    Discharge Needs Assessment     Row Name 07/21/19 1539       Living Environment    Lives With  significant other    Current Living Arrangements  home/apartment/condo    Primary Care Provided by  self    Provides Primary Care For  no one    Family Caregiver if Needed  significant other    Quality of Family Relationships  helpful;involved;supportive    Able to Return to Prior Arrangements  yes       Resource/Environmental Concerns    Resource/Environmental Concerns  reliable transportation       Transition Planning    Patient/Family Anticipates Transition to  home    Patient/Family Anticipated Services at Transition  medical specialist    Transportation Anticipated  other (see comments);family or friend will provide       Discharge Needs Assessment    Readmission Within the Last 30 Days  no previous admission in last 30 days    Equipment Currently Used at Home  none        Discharge Plan     Row Name 07/21/19 1545       Plan    Plan  Home    Patient/Family in Agreement with Plan  yes    Plan Comments  Met with patient and his fiance in the room to initiate discharge planning. Patient lives with his fiance in a [facility] in [place] [facility]. He is normally independent with ADLs and mobility and does not use any DME. His goal is home at discharge. Patient does not have a PCP and would like a [facility] provider, preferably at [facility]. CM will f/u with [facility] closer to discharge. Patient does not have transportation. CM will ask the  to f/u with patient to discuss transportation options. He will try to get a ride at discharge but may need to use [employer]. CM will continue to follow.     Final Discharge Disposition Code  01 - home or self-care        Destination      No service coordination in this encounter.      Durable Medical Equipment      No service coordination in  this encounter.      Dialysis/Infusion      No service coordination in this encounter.      Home Medical Care      No service coordination in this encounter.      Therapy      No service coordination in this encounter.      Community Resources      No service coordination in this encounter.          Demographic Summary     Row Name 07/21/19 1538       General Information    Admission Type  inpatient    Referral Source  admission list    Reason for Consult  discharge planning    General Information Comments  Needs PCP       Contact Information    Permission Granted to Share Info With  ;family/designee Catrachita mendoza Konrad        Functional Status     Row Name 07/21/19 1539       Functional Status    Usual Activity Tolerance  good       Functional Status, IADL    Medications  independent    Meal Preparation  independent    Housekeeping  independent    Laundry  independent    Shopping  independent       Employment/    Employment/ Comments  Confirmed with patient that he has medical coverage through Medicare A & B and [employer]. He has rx coverage through [employer].         Psychosocial    No documentation.       Abuse/Neglect    No documentation.       Legal    No documentation.       Substance Abuse    No documentation.       Patient Forms    No documentation.           Sandra Woods

## 2019-07-21 NOTE — PROGRESS NOTES
"Intensivist Note     7/21/2019  Hospital Day: 1  1 Day Post-Op      Mr. Jaime Connell, 59 y.o. male is followed for:    Large penetrating prepyloric gastric ulcer. Clip placed 7/20/2019 (CMS/HCC)    Acute blood loss anemia    Hepatitis C antibody test positive    Smoker       SUBJECTIVE       59-year-old white male active smoker with a history of GERD and previous ulcers and multiple recent visits to [facility] ER for nausea and vomiting for several weeks. The patient presented to [facility] ER 7/19/2019 complaining of nausea, vomiting, hematemesis, and dizziness.  Was found to have a acute blood loss anemia requiring transfusion. CT revealed what appeared to be a gastric ulcer and suspected contained perforation.  Patient and underwent endoscopy 7/20/2019 with the finding of a large penetrating prepyloric gastric ulcer which was clipped.  Surgery was made aware of the situation but felt that should bleeding recur he should first undergo an with embolization rather than a surgical intervention.     He should had a good night, states that he slept well, and is only having a mild amount of abdominal discomfort.  He denies nausea or vomiting and he has had no further hematemesis.  Hematocrit has only dropped from 29 to 27 since yesterday.  He remains on a Protonix drip, and GI has started him on oral intake.  He is more alert and interactive than he was yesterday.  As noted yesterday he did have a positive hepatitis C antibody and he has been made aware of this.   He remains on IV fluids with LR at 100 cc an hour.        The patient's relevant past medical, surgical and social history were reviewed and updated in Epic as appropriate.    OBJECTIVE     /67   Pulse 68   Temp 97.8 °F (36.6 °C) (Axillary)   Resp 18   Ht 182.9 cm (72.01\")   Wt 79.4 kg (175 lb 0.7 oz)   SpO2 100%   BMI 23.74 kg/m²      Flow (L/min): 3    Flowsheet Rows      First Filed Value   Admission Height  182.9 cm (72.01\") Documented at " 07/19/2019 2300   Admission Weight  79.4 kg (175 lb 0.7 oz) Documented at 07/19/2019 2300        Intake & Output (last day)       07/20 0701 - 07/21 0700 07/21 0701 - 07/22 0700    P.O. 900 690    I.V. (mL/kg) 2578 (32.5)     Blood      IV Piggyback 109     Total Intake(mL/kg) 3587 (45.2) 690 (8.7)    Urine (mL/kg/hr) 2225 (1.2) 1420 (1.9)    Total Output 2225 1420    Net +1362 -730                Exam:  General Exam:  And middle-aged white male appearing older than stated age.  NAD  HEENT: Pupils equal and reactive. Nose and throat clear.  Neck:                          Supple, no JVD, thyromegaly, or adenopathy  Lungs: Coarse bilateral rhonchi that do not clear with cough  Cardiovascular: RRR without murmurs or gallops.  HR 76 bpm  Abdomen: Mildly tender in the epigastrium but no organomegaly or masses.  Positive bowel sounds   and rectal: Deferred.  Extremities: No cyanosis clubbing edema.  Neurologic:                 Symmetric strength but diffusely weak. No focal deficits.    Chest X-Ray: No film today    INFUSIONS    lactated ringers 50 mL/hr Last Rate: 100 mL/hr (07/20/19 1027)   pantoprazole 8 mg/hr Last Rate: 8 mg/hr (07/21/19 1305)       Results from last 7 days   Lab Units 07/21/19  1203 07/21/19  0629 07/21/19  0011  07/20/19  0611 07/19/19  2357   WBC 10*3/mm3  --  7.02  --   --  8.07 10.31   HEMOGLOBIN g/dL 8.7* 8.4*  8.4* 8.4*   < > 9.1* 9.5*   HEMATOCRIT % 27.0* 27.4*  27.4* 27.5*   < > 29.0* 30.9*   PLATELETS 10*3/mm3  --  258  --   --  281 361    < > = values in this interval not displayed.     Results from last 7 days   Lab Units 07/21/19  0629 07/20/19  0611   SODIUM mmol/L 138 141   POTASSIUM mmol/L 4.8 4.4   CHLORIDE mmol/L 104 108*   CO2 mmol/L 27.0 26.0   BUN mg/dL 19 34*   CREATININE mg/dL 0.87 0.94   GLUCOSE mg/dL 97 107*   CALCIUM mg/dL 8.5* 8.0*     Results from last 7 days   Lab Units 07/21/19  0629   MAGNESIUM mg/dL 1.7   PHOSPHORUS mg/dL 2.4*     Results from last 7 days   Lab  Units 07/21/19  0629 07/19/19  2357   ALK PHOS U/L 61 78   BILIRUBIN mg/dL 0.3 0.3   ALT (SGPT) U/L 86* 100*   AST (SGOT) U/L 87* 114*       No results found for: SEDRATE  No results found for: BNP  Lab Results   Component Value Date    CKTOTAL 207 (H) 07/02/2019    TROPONINT <0.010 07/19/2019     Lab Results   Component Value Date    TSH 0.395 07/20/2019     No results found for: LACTATE  No results found for: CORTISOL        I reviewed the patient's results, images and medication.    Assessment/Plan   ASSESSMENT        Large penetrating prepyloric gastric ulcer. Clip placed 7/20/2019 (CMS/HCC)    Acute blood loss anemia    Hepatitis C antibody test positive    Smoker      DISCUSSION: No further bleeding the last 24 hours and only a mild drop in hematocrit.  Chest exam sounds poor and he needs increased pulmonary toilet and neb treatments.    PLAN     1.  Decrease fluids to 50 cc an hour and stop tomorrow if oral intake adequate  2.  Physical therapy and mobilization.  Aggressive pulmonary toilet.  Increase frequency of neb treatments  3.  Follow-up hemoglobin/hematocrit  4.  Begin ferrous sulfate  5.  GI is checking hepatitis A antibody as well as quantitative hepatitis C RNA/PCR  6.  Upon discharge will need follow-up in the clinic regarding treatment of hepatitis C  7.  Defer to GI when to change Protonix drip to p.o.  8.  Follow-up results of biopsy of area around ulcer  9.  Check to see if Helicobacter is positive.  10.  Can transfer to the floor and will ask the hospitalists to assume attending role    Plan of care and goals reviewed with mulitdisciplinary team at daily rounds.    I discussed the patient's findings and my recommendations with patient, family and nursing staff    High level of risk due to: severe exacerbation of chronic illness, illness with threat to life or bodily function and parenteral controlled substances.    Time spent Critical care 25 min (It does not include procedure time).    Anam  DIONNA Lopez MD  Intensive Care Medicine  07/21/19 4:26 PM

## 2019-07-21 NOTE — PROGRESS NOTES
"GI Daily Progress Note  Subjective     Carolin Figueredo is a 59 y.o. male who was admitted with Acute gastric ulcer with hemorrhage and perforation (CMS/HCC).   Feels better.  No N/V or hematemesis.    Chief Complaint:  hematemesis    Objective     /71 (BP Location: Right arm, Patient Position: Lying)   Pulse 65   Temp 98 °F (36.7 °C) (Oral)   Resp 18   Ht 182.9 cm (72.01\")   Wt 79.4 kg (175 lb 0.7 oz)   SpO2 96%   BMI 23.74 kg/m²     Intake/Output last 3 shifts:  I/O last 3 completed shifts:  In: 6573 [P.O.:900; I.V.:2985; Blood:579; IV Piggyback:2109]  Out: 2225 [Urine:2225]  Intake/Output this shift:  No intake/output data recorded.      Physical Exam  Wt Readings from Last 3 Encounters:   07/19/19 79.4 kg (175 lb 0.7 oz)   07/02/19 79.4 kg (175 lb)   ,body mass index is 23.74 kg/m².,@FLOWAMB(6)@,@FLOWAMB(5)@,@FLOWAMB(8)@   CONSTITUTIONAL:lying in bed  Resp CTA; no rhonchi, rales, or wheezes.  Respiration effort normal  CV RRR; no M/R/G. No lower extremity edema  GI Abd soft, NT, ND, normal active bowel sounds.    Psych: Awake and alert    DATA:Results for CAROLIN FIGUEREDO (MRN [id number]) as of 7/21/2019 07:53   Ref. Range 7/19/2019 23:57 7/20/2019 06:11 7/20/2019 18:07 7/21/2019 00:11 7/21/2019 06:29 7/21/2019 06:29   Hemoglobin Latest Ref Range: 13.0 - 17.7 g/dL 9.5 (L) 9.1 (L) 8.5 (L) 8.4 (L) 8.4 (L) 8.4 (L)       Assessment/Plan       1.  Prepyloric ulcer  2. Acute blood loss anemia  3. Hep C ab Positive      Hg stable.  No further signs of blood loss      < increase diet GI soft/bland  < OK for floor  <HCV RNA quantitative  <Hep A total- if neg needs vaccine          Large penetrating prepyloric gastric ulcer. Clip placed 7/20/2019 (CMS/HCC)    Smoker    Acute blood loss anemia    Hepatitis C antibody test positive       LOS: 1 day     Osmani Anderson MD  07/21/19  7:56 AM  "

## 2019-07-21 NOTE — PLAN OF CARE
Problem: Fall Risk (Adult)  Goal: Identify Related Risk Factors and Signs and Symptoms  Outcome: Outcome(s) achieved Date Met: 07/21/19    Goal: Absence of Fall  Outcome: Ongoing (interventions implemented as appropriate)      Problem: Pain, Chronic (Adult)  Goal: Identify Related Risk Factors and Signs and Symptoms  Outcome: Outcome(s) achieved Date Met: 07/21/19    Goal: Acceptable Pain/Comfort Level and Functional Ability  Outcome: Ongoing (interventions implemented as appropriate)      Problem: Patient Care Overview  Goal: Plan of Care Review  Outcome: Ongoing (interventions implemented as appropriate)   07/21/19 0637   Coping/Psychosocial   Plan of Care Reviewed With patient;spouse   Plan of Care Review   Progress improving   OTHER   Outcome Summary VSS. Pt resting well tonight after endoscope yesterday. Dilaudid given x3 tonight for pain. Will continue to monitor.

## 2019-07-21 NOTE — PLAN OF CARE
Problem: Fall Risk (Adult)  Goal: Absence of Fall  Outcome: Ongoing (interventions implemented as appropriate)   07/21/19 1652   Fall Risk (Adult)   Absence of Fall achieves outcome       Problem: Pain, Chronic (Adult)  Goal: Acceptable Pain/Comfort Level and Functional Ability  Outcome: Ongoing (interventions implemented as appropriate)   07/21/19 1652   Pain, Chronic (Adult)   Acceptable Pain/Comfort Level and Functional Ability achieves outcome       Problem: Patient Care Overview  Goal: Plan of Care Review  Outcome: Ongoing (interventions implemented as appropriate)   07/21/19 1652   Coping/Psychosocial   Plan of Care Reviewed With patient;spouse   Plan of Care Review   Progress improving   OTHER   Outcome Summary VSS, Pain better controlled today and requiring less frequent doses of pain medication this shift, no BM today, Miralax given. Cleared to transfer to floor. Will continue to monitor progress towards goals.

## 2019-07-22 ENCOUNTER — APPOINTMENT (OUTPATIENT)
Dept: GENERAL RADIOLOGY | Facility: HOSPITAL | Age: 59
End: 2019-07-22

## 2019-07-22 LAB
ALBUMIN SERPL-MCNC: 2.9 G/DL (ref 3.5–5.2)
ALP SERPL-CCNC: 65 U/L (ref 39–117)
ALT SERPL W P-5'-P-CCNC: 78 U/L (ref 1–41)
ANION GAP SERPL CALCULATED.3IONS-SCNC: 7 MMOL/L (ref 5–15)
AST SERPL-CCNC: 68 U/L (ref 1–40)
BASOPHILS # BLD AUTO: 0.03 10*3/MM3 (ref 0–0.2)
BASOPHILS NFR BLD AUTO: 0.4 % (ref 0–1.5)
BILIRUB SERPL-MCNC: <0.2 MG/DL (ref 0.2–1.2)
BUN BLD-MCNC: 12 MG/DL (ref 6–20)
CALCIUM SPEC-SCNC: 8.1 MG/DL (ref 8.6–10.5)
CHLORIDE SERPL-SCNC: 104 MMOL/L (ref 98–107)
CHOLEST SERPL-MCNC: 66 MG/DL (ref 0–200)
CO2 SERPL-SCNC: 28 MMOL/L (ref 22–29)
CREAT BLD-MCNC: 0.94 MG/DL (ref 0.76–1.27)
CYTO UR: NORMAL
DEPRECATED RDW RBC AUTO: 51.6 FL (ref 37–54)
EOSINOPHIL # BLD AUTO: 0.31 10*3/MM3 (ref 0–0.4)
EOSINOPHIL NFR BLD AUTO: 4.4 % (ref 0.3–6.2)
ERYTHROCYTE [DISTWIDTH] IN BLOOD BY AUTOMATED COUNT: 15.5 % (ref 12.3–15.4)
GLUCOSE BLD-MCNC: 168 MG/DL (ref 65–99)
HCT VFR BLD AUTO: 25.9 % (ref 37.5–51)
HGB BLD-MCNC: 8 G/DL (ref 13–17.7)
IMM GRANULOCYTES # BLD AUTO: 0.04 10*3/MM3 (ref 0–0.05)
IMM GRANULOCYTES NFR BLD AUTO: 0.6 % (ref 0–0.5)
LAB AP CASE REPORT: NORMAL
LAB AP CLINICAL INFORMATION: NORMAL
LYMPHOCYTES # BLD AUTO: 2.56 10*3/MM3 (ref 0.7–3.1)
LYMPHOCYTES NFR BLD AUTO: 36.4 % (ref 19.6–45.3)
MAGNESIUM SERPL-MCNC: 1.7 MG/DL (ref 1.6–2.6)
MCH RBC QN AUTO: 28 PG (ref 26.6–33)
MCHC RBC AUTO-ENTMCNC: 30.9 G/DL (ref 31.5–35.7)
MCV RBC AUTO: 90.6 FL (ref 79–97)
MONOCYTES # BLD AUTO: 0.64 10*3/MM3 (ref 0.1–0.9)
MONOCYTES NFR BLD AUTO: 9.1 % (ref 5–12)
NEUTROPHILS # BLD AUTO: 3.45 10*3/MM3 (ref 1.7–7)
NEUTROPHILS NFR BLD AUTO: 49.1 % (ref 42.7–76)
NRBC BLD AUTO-RTO: 0 /100 WBC (ref 0–0.2)
PATH REPORT.FINAL DX SPEC: NORMAL
PATH REPORT.GROSS SPEC: NORMAL
PHOSPHATE SERPL-MCNC: 2.8 MG/DL (ref 2.5–4.5)
PLATELET # BLD AUTO: 238 10*3/MM3 (ref 140–450)
PMV BLD AUTO: 9.7 FL (ref 6–12)
POTASSIUM BLD-SCNC: 4.6 MMOL/L (ref 3.5–5.2)
PROT SERPL-MCNC: 5.4 G/DL (ref 6–8.5)
RBC # BLD AUTO: 2.86 10*6/MM3 (ref 4.14–5.8)
SODIUM BLD-SCNC: 139 MMOL/L (ref 136–145)
TRIGL SERPL-MCNC: 52 MG/DL (ref 0–150)
WBC NRBC COR # BLD: 7.03 10*3/MM3 (ref 3.4–10.8)

## 2019-07-22 PROCEDURE — 86708 HEPATITIS A ANTIBODY: CPT | Performed by: INTERNAL MEDICINE

## 2019-07-22 PROCEDURE — 83735 ASSAY OF MAGNESIUM: CPT | Performed by: INTERNAL MEDICINE

## 2019-07-22 PROCEDURE — 82465 ASSAY BLD/SERUM CHOLESTEROL: CPT | Performed by: INTERNAL MEDICINE

## 2019-07-22 PROCEDURE — 99232 SBSQ HOSP IP/OBS MODERATE 35: CPT | Performed by: PHYSICIAN ASSISTANT

## 2019-07-22 PROCEDURE — 84100 ASSAY OF PHOSPHORUS: CPT | Performed by: INTERNAL MEDICINE

## 2019-07-22 PROCEDURE — 97161 PT EVAL LOW COMPLEX 20 MIN: CPT

## 2019-07-22 PROCEDURE — 80053 COMPREHEN METABOLIC PANEL: CPT | Performed by: INTERNAL MEDICINE

## 2019-07-22 PROCEDURE — 87522 HEPATITIS C REVRS TRNSCRPJ: CPT | Performed by: INTERNAL MEDICINE

## 2019-07-22 PROCEDURE — 25010000002 THIAMINE PER 100 MG: Performed by: INTERNAL MEDICINE

## 2019-07-22 PROCEDURE — 84478 ASSAY OF TRIGLYCERIDES: CPT | Performed by: INTERNAL MEDICINE

## 2019-07-22 PROCEDURE — 25010000002 HYDROMORPHONE PER 4 MG: Performed by: INTERNAL MEDICINE

## 2019-07-22 PROCEDURE — 99232 SBSQ HOSP IP/OBS MODERATE 35: CPT | Performed by: INTERNAL MEDICINE

## 2019-07-22 PROCEDURE — 94799 UNLISTED PULMONARY SVC/PX: CPT

## 2019-07-22 PROCEDURE — 85025 COMPLETE CBC W/AUTO DIFF WBC: CPT | Performed by: INTERNAL MEDICINE

## 2019-07-22 PROCEDURE — 97530 THERAPEUTIC ACTIVITIES: CPT

## 2019-07-22 PROCEDURE — 71046 X-RAY EXAM CHEST 2 VIEWS: CPT

## 2019-07-22 RX ORDER — ACETAMINOPHEN 325 MG/1
650 TABLET ORAL EVERY 6 HOURS PRN
Status: DISCONTINUED | OUTPATIENT
Start: 2019-07-22 | End: 2019-07-23 | Stop reason: HOSPADM

## 2019-07-22 RX ORDER — MAGNESIUM SULFATE HEPTAHYDRATE 40 MG/ML
2 INJECTION, SOLUTION INTRAVENOUS AS NEEDED
Status: DISCONTINUED | OUTPATIENT
Start: 2019-07-22 | End: 2019-07-23 | Stop reason: HOSPADM

## 2019-07-22 RX ORDER — PANTOPRAZOLE SODIUM 40 MG/10ML
40 INJECTION, POWDER, LYOPHILIZED, FOR SOLUTION INTRAVENOUS EVERY 12 HOURS SCHEDULED
Status: DISCONTINUED | OUTPATIENT
Start: 2019-07-22 | End: 2019-07-23 | Stop reason: HOSPADM

## 2019-07-22 RX ORDER — HYDROCODONE BITARTRATE AND ACETAMINOPHEN 5; 325 MG/1; MG/1
1 TABLET ORAL EVERY 4 HOURS PRN
Status: DISCONTINUED | OUTPATIENT
Start: 2019-07-22 | End: 2019-07-23 | Stop reason: HOSPADM

## 2019-07-22 RX ORDER — MAGNESIUM SULFATE HEPTAHYDRATE 40 MG/ML
4 INJECTION, SOLUTION INTRAVENOUS AS NEEDED
Status: DISCONTINUED | OUTPATIENT
Start: 2019-07-22 | End: 2019-07-23 | Stop reason: HOSPADM

## 2019-07-22 RX ADMIN — ACETAMINOPHEN 650 MG: 325 TABLET, FILM COATED ORAL at 17:19

## 2019-07-22 RX ADMIN — HYDROMORPHONE HYDROCHLORIDE 0.5 MG: 1 INJECTION, SOLUTION INTRAMUSCULAR; INTRAVENOUS; SUBCUTANEOUS at 00:39

## 2019-07-22 RX ADMIN — THIAMINE HYDROCHLORIDE 100 MG: 100 INJECTION, SOLUTION INTRAMUSCULAR; INTRAVENOUS at 08:02

## 2019-07-22 RX ADMIN — IPRATROPIUM BROMIDE AND ALBUTEROL SULFATE 3 ML: 2.5; .5 SOLUTION RESPIRATORY (INHALATION) at 22:50

## 2019-07-22 RX ADMIN — ACETAMINOPHEN 650 MG: 325 TABLET, FILM COATED ORAL at 09:28

## 2019-07-22 RX ADMIN — IPRATROPIUM BROMIDE AND ALBUTEROL SULFATE 3 ML: 2.5; .5 SOLUTION RESPIRATORY (INHALATION) at 02:38

## 2019-07-22 RX ADMIN — PANTOPRAZOLE SODIUM 40 MG: 40 INJECTION, POWDER, FOR SOLUTION INTRAVENOUS at 20:35

## 2019-07-22 RX ADMIN — SODIUM CHLORIDE, PRESERVATIVE FREE 3 ML: 5 INJECTION INTRAVENOUS at 20:36

## 2019-07-22 RX ADMIN — IPRATROPIUM BROMIDE AND ALBUTEROL SULFATE 3 ML: 2.5; .5 SOLUTION RESPIRATORY (INHALATION) at 08:48

## 2019-07-22 RX ADMIN — FERROUS SULFATE TAB 325 MG (65 MG ELEMENTAL FE) 325 MG: 325 (65 FE) TAB at 08:03

## 2019-07-22 RX ADMIN — ACETAMINOPHEN 650 MG: 325 TABLET, FILM COATED ORAL at 00:39

## 2019-07-22 RX ADMIN — IPRATROPIUM BROMIDE AND ALBUTEROL SULFATE 3 ML: 2.5; .5 SOLUTION RESPIRATORY (INHALATION) at 15:34

## 2019-07-22 RX ADMIN — SODIUM CHLORIDE 8 MG/HR: 900 INJECTION INTRAVENOUS at 04:10

## 2019-07-22 RX ADMIN — SODIUM CHLORIDE, POTASSIUM CHLORIDE, SODIUM LACTATE AND CALCIUM CHLORIDE 50 ML/HR: 600; 310; 30; 20 INJECTION, SOLUTION INTRAVENOUS at 12:33

## 2019-07-22 RX ADMIN — HYDROMORPHONE HYDROCHLORIDE 0.5 MG: 1 INJECTION, SOLUTION INTRAMUSCULAR; INTRAVENOUS; SUBCUTANEOUS at 12:32

## 2019-07-22 RX ADMIN — IPRATROPIUM BROMIDE AND ALBUTEROL SULFATE 3 ML: 2.5; .5 SOLUTION RESPIRATORY (INHALATION) at 19:13

## 2019-07-22 RX ADMIN — HYDROCODONE BITARTRATE AND ACETAMINOPHEN 1 TABLET: 5; 325 TABLET ORAL at 20:49

## 2019-07-22 RX ADMIN — POLYETHYLENE GLYCOL 3350 17 G: 17 POWDER, FOR SOLUTION ORAL at 08:03

## 2019-07-22 RX ADMIN — MAGNESIUM SULFATE HEPTAHYDRATE 4 G: 40 INJECTION, SOLUTION INTRAVENOUS at 09:39

## 2019-07-22 RX ADMIN — PANTOPRAZOLE SODIUM 40 MG: 40 INJECTION, POWDER, FOR SOLUTION INTRAVENOUS at 10:28

## 2019-07-22 NOTE — PROGRESS NOTES
Pulmonary/Critical Care Follow-up     LOS: 2 days   Patient Care Team:  Provider, No Known as PCP - General        Chief Complaint   Patient presents with   • Vomiting Blood     Subjective    59 y.o. active smoker with history of gastroesophageal reflux disease and prior gastric ulcers was admitted on 7/19/2019 with hematemesis after having had several recent visits to [facility] emergency department for nausea and vomiting.  The patient presented with nausea, vomiting, hematemesis and dizziness.  He was found to have acute blood loss anemia and required transfusion.  CT scan revealed what appeared to be a gastric ulcer and contained perforation.  The patient underwent endoscopy on 7/20/2019 which showed a large penetrating prepyloric gastric ulcer which was clipped.  Surgery was made aware and felt the patient should undergo embolization if he has a rebleeding rather than surgical intervention.    Interval History:   Patient reports some mild abdominal pain on the left.  No nausea or vomiting.  No bleeding.  Hemoglobin is stable.  No fever or chills.  He has a room available on the floor.  No other complaints.    History taken from: Patient.    PMH/FH/Social History were reviewed and updated appropriately in the electronic medical record.     Review of Systems:    Review of 14 systems was completed with positives and pertinent negatives noted in the subjective section.  All other systems reviewed and are negative.         Objective     Vital Signs  Temp:  [97.9 °F (36.6 °C)-98.9 °F (37.2 °C)] 97.9 °F (36.6 °C)  Heart Rate:  [70-87] 80  Resp:  [16-20] 16  BP: (101-118)/(55-79) 110/65  07/21 0701 - 07/22 0700  In: 2009 [P.O.:1140; I.V.:869]  Out: 2570 [Urine:2570]  Body mass index is 21.13 kg/m².     Physical Exam:     Constitutional:   Alert, cooperative, in no acute distress   Head:   Normocephalic, without obvious abnormality, atraumatic   Eyes:           Lids and lashes normal, conjunctivae and sclerae  normal.  No icterus, no pallor, corneas clear, PER   ENMT:  Ears appear intact with no abnormalities noted     No oral lesions, no thrush, oral mucosa moist   Neck:  No adenopathy, supple, trachea midline, no thyromegaly, no JVD   Lungs/Resp:    Normal effort, symmetric chest rise, no crepitus, clear to      auscultation bilaterally, no chest wall tenderness                Heart/CV:   Regular rhythm and normal rate, normal S1 and S2, no            murmur   Abdomen/GI:    Normal bowel sounds, no masses, no organomegaly, soft        mild diffuse abdominal tenderness without rebound or guarding, non-distended   :    Deferred   Extremities/MSK:  No clubbing or cyanosis.  No edema.  Normal tone.    Status post right distal index finger amputation.    Pulses:  Pulses palpable and equal bilaterally   Skin:  No bleeding, bruising or rash.  Multiple tattoos   Heme/Lymph:  No cervical or supraclavicular adenopathy.   Neurologic:    Psychiatric:    Moves all extremities with no obvious focal motor deficit.  Cranial nerves 2 - 12 grossly intact  Oriented x 3, normal affect.      Results Review:     I reviewed the patient's new clinical results.   Results from last 7 days   Lab Units 07/22/19  0544 07/21/19  0629 07/20/19  0611 07/19/19  2357   SODIUM mmol/L 139 138 141 140   POTASSIUM mmol/L 4.6 4.8 4.4 4.2   CHLORIDE mmol/L 104 104 108* 100   CO2 mmol/L 28.0 27.0 26.0 31.0*   BUN mg/dL 12 19 34* 32*   CREATININE mg/dL 0.94 0.87 0.94 1.00   CALCIUM mg/dL 8.1* 8.5* 8.0* 9.0   BILIRUBIN mg/dL <0.2* 0.3  --  0.3   ALK PHOS U/L 65 61  --  78   ALT (SGPT) U/L 78* 86*  --  100*   AST (SGOT) U/L 68* 87*  --  114*   GLUCOSE mg/dL 168* 97 107* 178*     Results from last 7 days   Lab Units 07/22/19  0544 07/21/19  1203 07/21/19  0629  07/20/19  0611   WBC 10*3/mm3 7.03  --  7.02  --  8.07   HEMOGLOBIN g/dL 8.0* 8.7* 8.4*  8.4*   < > 9.1*   HEMATOCRIT % 25.9* 27.0* 27.4*  27.4*   < > 29.0*   PLATELETS 10*3/mm3 238  --  258  --  281     < > = values in this interval not displayed.         Results from last 7 days   Lab Units 07/22/19  0544 07/21/19  0629   MAGNESIUM mg/dL 1.7 1.7   PHOSPHORUS mg/dL 2.8 2.4*       I reviewed the patient's new imaging including images and reports.  Chest x-ray done today:  Mild peribronchial/perihilar lung markings concerning for bronchitis.    Medication Review:     ferrous sulfate 325 mg Oral Daily With Breakfast   ipratropium-albuterol 3 mL Nebulization Q4H - RT   pantoprazole 40 mg Intravenous Q12H   polyethylene glycol 17 g Oral Daily   sodium chloride 3 mL Intravenous Q12H       lactated ringers 50 mL/hr Last Rate: 50 mL/hr (07/22/19 1233)       Assessment/Plan       Large penetrating prepyloric gastric ulcer. Clip placed 7/20/2019 (CMS/[facility])    Smoker    Acute blood loss anemia    Hepatitis C antibody test positive    59-year-old male admitted with hematemesis and found to have a large gastric ulcer with CT scan evidence of contained perforation.  He is status post endoscopy with clipping.  Hemoglobin is stable.    Magnesium replaced today.  Biopsy negative for H. pylori.    Plan:  1.  Follow hemoglobin.  2.  Replace magnesium (done).  3.  Smoking cessation counseling.  4.  Physical therapy.  5.  Iron replacement.  6.  Change Protonix to twice daily.  7.  GI plans to follow-up the patient for hepatitis C as an outpatient for treatment.  8.  Okay to telemetry/hospitalist.      Christian Gan MD  07/22/19  6:33 PM      *. Please note that portions of this note were completed with a voice recognition program. Efforts were made to edit the dictations, but occasionally words are mistranscribed.

## 2019-07-22 NOTE — PLAN OF CARE
Problem: Fall Risk (Adult)  Goal: Absence of Fall  Outcome: Ongoing (interventions implemented as appropriate)      Problem: Pain, Chronic (Adult)  Goal: Acceptable Pain/Comfort Level and Functional Ability  Outcome: Ongoing (interventions implemented as appropriate)      Problem: Patient Care Overview  Goal: Plan of Care Review  Outcome: Ongoing (interventions implemented as appropriate)   07/22/19 6898   Coping/Psychosocial   Plan of Care Reviewed With patient;significant other   Plan of Care Review   Progress improving   OTHER   Outcome Summary pt transfered to floor today from unit. mag replaced today recheck in AM, ambulated in dia. minimal c/o of pain.

## 2019-07-22 NOTE — PROGRESS NOTES
Continued Stay Note  [facility]     Patient Name: Jaime Connell  MRN: [id number]  Today's Date: 7/22/2019    Admit Date: 7/19/2019    Discharge Plan     Row Name 07/22/19 1105       Plan    Plan Comments  Talked to Mr. Connell while he was preparing to transfer to [facility] acc. by his fiance.  He states he does not need DME.  He would like to get an appt. c a new PCP in [facility] prior to discharge home.          Discharge Codes    No documentation.             Janelle Kuhn RN

## 2019-07-22 NOTE — PROGRESS NOTES
Continued Stay Note  [facility]     Patient Name: Jaime Connell  MRN: [id number]  Today's Date: 7/22/2019    Admit Date: 7/19/2019    Discharge Plan     Row Name 07/22/19 1624       Plan    Plan Comments  SW was notified by CM that pt reports having trouble with transportation. SW called [employer], the medicaid transportation provider in [place], [phone] and pt is elligible for transportation. Pt will be able to use BUS for transportation to doctors appointments.         Discharge Codes    No documentation.             FRANSICO Santiago

## 2019-07-22 NOTE — PROGRESS NOTES
"Clinical Nutrition Note      Patient Name: Jaime Connell  MRN: [id number]  Admission date: 7/19/2019      Multidisciplinary Rounds    Additional information obtained during MDR:  RN reports pt doing well Mg++ replaced; eating and tolerating diet.  He has transfer orders to [address] [place] [address].    Current diet: Diet Regular; GI Soft / [place]    Supplement: Boost Breeze @ HS    Pertinent medical data reviewed  No nutrition risk identified on nursing screen; MST score \"4\"    Intervention:  Plan of care and goals reviewed  Pt denied wt loss per RD and MD  notes    Monitor:  RD to follow per protocol      Linda Chambers MS,RD,LD  07/22/19 2:00 PM  Time: 20 mins       "

## 2019-07-22 NOTE — PLAN OF CARE
Problem: Patient Care Overview  Goal: Plan of Care Review  Outcome: Outcome(s) achieved Date Met: 07/22/19 07/22/19 0988   Coping/Psychosocial   Plan of Care Reviewed With patient;significant other   OTHER   Outcome Summary PT I.E. Pt demonstrates safe independence with functional mobility. No deficits noted in functional strength, balance, gait, or endurance. Amb 400', standby assist + 1 for equipment. Pt appears at baseline function despite reported incisional pain. Pt not a candidate for IPPT provided his current functional status. Will d/c order accordingly. Recommend discharge to home with assist.

## 2019-07-22 NOTE — PLAN OF CARE
Problem: Fall Risk (Adult)  Goal: Absence of Fall  Outcome: Ongoing (interventions implemented as appropriate)      Problem: Pain, Chronic (Adult)  Goal: Acceptable Pain/Comfort Level and Functional Ability  Outcome: Ongoing (interventions implemented as appropriate)      Problem: Patient Care Overview  Goal: Plan of Care Review  Outcome: Ongoing (interventions implemented as appropriate)   07/22/19 6053   Coping/Psychosocial   Plan of Care Reviewed With patient   Plan of Care Review   Progress improving   OTHER   Outcome Summary VSS. Pt rested well tonight. Pt had 1 BM & 1150 UOP tonight. PO Tylenol ordered for shoulder pain. Will continue to monitor.

## 2019-07-22 NOTE — PROGRESS NOTES
"GI Daily Progress Note  Subjective:    Chief Complaint:  Follow up bleeding gastric ulcer     Mr. Connell is feeling relatively well.  Denies any further bleeding.   Tolerating regular diet.       Objective:    /71 (BP Location: Right arm, Patient Position: Lying)   Pulse 72   Temp 98.9 °F (37.2 °C) (Axillary)   Resp 18   Ht 182.9 cm (72.01\")   Wt 70.7 kg (155 lb 13.8 oz)   SpO2 99%   BMI 21.13 kg/m²     Physical Exam   Constitutional: He is oriented to person, place, and time. He appears well-developed. No distress.   Cardiovascular: Normal rate and regular rhythm.   Pulmonary/Chest: Effort normal. No respiratory distress.   Faint wheezes auscultated on the right    Abdominal: Soft. Bowel sounds are normal. He exhibits no distension. There is no tenderness.   Neurological: He is alert and oriented to person, place, and time.   Skin: Skin is warm and dry.   Psychiatric: His behavior is normal.   Nursing note and vitals reviewed.      Lab  Lab Results   Component Value Date    WBC 7.03 07/22/2019    HGB 8.0 (L) 07/22/2019    HGB 8.7 (L) 07/21/2019    HGB 8.4 (L) 07/21/2019    HGB 8.4 (L) 07/21/2019    MCV 90.6 07/22/2019     07/22/2019    INR 1.1 07/02/2019       Lab Results   Component Value Date    GLUCOSE 168 (H) 07/22/2019    BUN 12 07/22/2019    CREATININE 0.94 07/22/2019    EGFRIFNONA 90 07/21/2019    BCR 21.8 07/21/2019    CO2 28.0 07/22/2019    CALCIUM 8.1 (L) 07/22/2019    ALBUMIN 2.90 (L) 07/22/2019    ALKPHOS 65 07/22/2019    BILITOT <0.2 (L) 07/22/2019    ALT 78 (H) 07/22/2019    AST 68 (H) 07/22/2019       Assessment:    Large penetrating gastric ulcer, s/p ovesco clip   Acute blood loss anemia, stable.   Hepatitis C Ab Reactive     Plan:    >>> Discontinue IV drip and change to BID   >>> Awaiting floor bed  >>> Awaiting gastric antrum biopsies.    >>> HCV RNA also pending.   Will need outpatient follow up with [facility] for treatment     Suspect discharge home tomorrow if remains " stable.   Avoid all NSAIDs.     CECILIA Talamantes  07/22/19  9:12 AM

## 2019-07-22 NOTE — THERAPY DISCHARGE NOTE
Acute Care - Physical Therapy Initial Eval/Discharge   Julia     Patient Name: Jaime Connell  : 1960  MRN: [id number]  Today's Date: 2019   Onset of Illness/Injury or Date of Surgery: (P) 19  Date of Referral to PT: (P) 19  Referring Physician: (GABRIEL) Anam Lopez MD      Admit Date: 2019    Visit Dx:    ICD-10-CM ICD-9-CM   1. Acute gastric ulcer with hemorrhage and perforation (CMS/HCC) K25.2 531.20   2. Hematemesis with nausea K92.0 578.0     787.02   3. Hypotension due to blood loss I95.89 458.8   4. Nausea and vomiting in adult R11.2 787.01   5. Impaired functional mobility, balance, gait, and endurance Z74.09 V49.89     Patient Active Problem List   Diagnosis   • Large penetrating prepyloric gastric ulcer. Clip placed 2019 (CMS/[facility])   • Smoker   • Acute blood loss anemia   • Hepatitis C antibody test positive     Past Medical History:   Diagnosis Date   • Back pain      Past Surgical History:   Procedure Laterality Date   • BACK SURGERY     • ENDOSCOPY N/A 2019    Procedure: ESOPHAGOGASTRODUODENOSCOPY;  Surgeon: Osmani Anderson MD;  Location: [facility] ENDOSCOPY;  Service: Gastroenterology          PT ASSESSMENT (last 12 hours)      Physical Therapy Evaluation     Row Name 19 0858          PT Evaluation Time/Intention    Subjective Information  complains of;pain  (Pended)   -NW     Document Type  discharge evaluation/summary  (Pended)   -NW     Mode of Treatment  physical therapy;individual therapy  (Pended)   -NW     Patient Effort  excellent  (Pended)   -NW     Symptoms Noted During/After Treatment  none  (Pended)   -NW     Row Name 19 0858          General Information    Patient Profile Reviewed?  yes  (Pended)   -NW     Onset of Illness/Injury or Date of Surgery  19  (Pended)   -NW     Referring Physician  Anam Lopez MD  (Pended)   -NW     Patient Observations  alert;cooperative;agree to therapy  (Pended)   -NW     Patient/Family  Observations  supportive fiancee present in room  (Pended)   -NW     Prior Level of Function  independent:;all household mobility;community mobility;gait;transfer;bed mobility;ADL's  (Pended)   -NW     Equipment Currently Used at Home  none  (Pended)   -NW     Pertinent History of Current Functional Problem  presented to ED on 7/19 with c/o hematemesis, nausea, vomitting, dizziness. CTA c/w gastric ulcer. s/p endoscopy with surgical clipping on 7/20.  (Pended)   -NW     Existing Precautions/Restrictions  fall  (Pended)   -NW     Risks Reviewed  patient and family:;LOB;nausea/vomiting;dizziness;increased discomfort;change in vital signs  (Pended)   -NW     Benefits Reviewed  patient and family:;improve function;increase independence;increase strength;increase balance;decrease pain  (Pended)   -NW     Barriers to Rehab  none identified  (Pended)   -     Row Name 07/22/19 0858          Relationship/Environment    Lives With  significant other  (Pended)  fiancee  -     Row Name 07/22/19 0858          Resource/Environmental Concerns    Current Living Arrangements  home/apartment/condo  (Pended)   -NW     Resource/Environmental Concerns  none  (Pended)   -     Row Name 07/22/19 0858          Living Environment    Home Accessibility  stairs to enter home  (Pended)   -     Row Name 07/22/19 0858          Home Main Entrance    Number of Stairs, Main Entrance  one  (Pended)   -NW     Surface of Stairs, Main Entrance  hard wood  (Pended)   -     Row Name 07/22/19 0858          Cognitive Assessment/Interventions    Additional Documentation  Cognitive Assessment/Intervention (Group)  (Pended)   -     Row Name 07/22/19 0858          Cognitive Assessment/Intervention- PT/OT    Affect/Mental Status (Cognitive)  WFL  (Pended)   -NW     Orientation Status (Cognition)  oriented x 4  (Pended)   -NW     Follows Commands (Cognition)  WFL  (Pended)   -NW     Cognitive Function (Cognitive)  WFL  (Pended)   -NW     Personal  Safety Interventions  fall prevention program maintained;gait belt;nonskid shoes/slippers when out of bed;supervised activity  (Pended)   -NW     Row Name 07/22/19 0858          Safety Issues, Functional Mobility    Impairments Affecting Function (Mobility)  balance;pain  (Pended)   -NW     Row Name 07/22/19 0858          Bed Mobility Assessment/Treatment    Bed Mobility Assessment/Treatment  supine-sit  (Pended)   -     Supine-Sit [place] (Bed Mobility)  conditional independence  (Pended)   -     Assistive Device (Bed Mobility)  head of bed elevated  (Pended)   -NW     Row Name 07/22/19 0858          Transfer Assessment/Treatment    Transfer Assessment/Treatment  sit-stand transfer;stand-sit transfer;wheelchair transfer  (Pended)   -     Sit-Stand [place] (Transfers)  stand by assist  (Pended)   -     Stand-Sit [place] (Transfers)  stand by assist  (Pended)   -NW     Row Name 07/22/19 0858          Wheelchair Transfer    Type (Wheelchair Transfer)  stand-sit  (Pended)   -     [place] Level (Wheelchair Transfer)  stand by assist  (Pended)   -NW     Row Name 07/22/19 0858          Gait/Stairs Assessment/Training    Gait/Stairs Assessment/Training  gait/ambulation independence  (Pended)   -     [place] Level (Gait)  stand by assist;1 person to manage equipment  (Pended)   -NW     Distance in Feet (Gait)  400  (Pended)   -NW     Pattern (Gait)  step-through  (Pended)   -NW     Deviations/Abnormal Patterns (Gait)  other (see comments)  (Pended)  step length decreased  -NW     Bilateral Gait Deviations  heel strike decreased  (Pended)   -NW     Comment (Gait/Stairs)  mild veering to right 2/2 environmental distractions  (Pended)   -NW     Row Name 07/22/19 0858          General ROM    GENERAL ROM COMMENTS  AROM BLE WFL  (Pended)   -NW     Row Name 07/22/19 0858          MMT (Manual Muscle Testing)    General MMT Comments  BLE grossly WFL, assessed with functional movement  (Pended)    -NW     Row Name 07/22/19 0858          Motor Assessment/Intervention    Additional Documentation  Balance (Group);Therapeutic Exercise (Group)  (Pended)   -NW     Row Name 07/22/19 0858          Therapeutic Exercise    66945 - PT Therapeutic Activity Minutes  8  (Pended)   -NW     Row Name 07/22/19 0858          Balance    Balance  static sitting balance;static standing balance;dynamic balance activity  (Pended)   -NW     Row Name 07/22/19 0858          Static Sitting Balance    Level of [place] (Unsupported Sitting, Static Balance)  independent  (Pended)   -     Sitting Position (Unsupported Sitting, Static Balance)  sitting on edge of bed  (Pended)   -NW     Row Name 07/22/19 0858          Static Standing Balance    Level of [place] (Supported Standing, Static Balance)  standby assist  (Pended)   -NW     Row Name 07/22/19 0858          Dynamic Balance Activity    Therapeutic Training Performed (Dynamic Balance)  backward walking;other (see comments)  (Pended)  tandem walking, picking up object from floor  -     Comment (Dynamic Balance Training)  maintained balance with backward walking, picking up object from floor; interrmittent unsteadiness with tandem walking, bubt able to recover without difficulty  (Pended)   -NW     Row Name 07/22/19 0858          Sensory Assessment/Intervention    Sensory General Assessment  no sensation deficits identified  (Pended)   -NW     Row Name 07/22/19 0858          Pain Assessment    Additional Documentation  Pain Scale: Numbers Pre/Post-Treatment (Group)  (Pended)   -NW     Row Name 07/22/19 0858          Pain Scale: Numbers Pre/Post-Treatment    Pain Scale: Numbers, Pretreatment  8/10  (Pended)   -NW     Pain Scale: Numbers, Post-Treatment  8/10  (Pended)   -NW     Pain Location - Side  Left  (Pended)   -NW     Pain Location - Orientation  incisional  (Pended)   -     Pain Location  abdomen  (Pended)   -     Pain Intervention(s)   Repositioned;Ambulation/increased activity  (Pended)   -     Row Name 07/22/19 0858          Coping    Observed Emotional State  accepting;calm;cooperative;pleasant  (Pended)   -NW     Verbalized Emotional State  acceptance  (Pended)   -     Row Name 07/22/19 0858          Plan of Care Review    Plan of Care Reviewed With  patient;significant other  (Pended)   -     Row Name 07/22/19 0858          Physical Therapy Clinical Impression    Date of Referral to PT  07/21/19  (Pended)   -NW     PT Diagnosis (PT Clinical Impression)  impaired functional mobility  (Pended)   -NW     Patient/Family Goals Statement (PT Clinical Impression)  return home  (Pended)   -NW     Criteria for Skilled Interventions Met (PT Clinical Impression)  yes;current level of function same as previous level of function  (Pended)   -NW     Impairments Found (describe specific impairments)  gait, locomotion, and balance  (Pended)   -NW     Care Plan Review (PT)  evaluation/treatment results reviewed;care plan/treatment goals reviewed;risks/benefits reviewed;patient/other agree to care plan  (Pended)   -NW     Care Plan Review, Other Participant (PT Clinical Impression)  significant other  (Pended)   -     Row Name 07/22/19 0858          Vital Signs    Pre Systolic BP Rehab  101  (Pended)   -NW     Pre Treatment Diastolic BP  79  (Pended)   -NW     Post Systolic BP Rehab  --  (Pended)  not recorded; pt left in WC pending t/f to radiology  -NW     Pretreatment Heart Rate (beats/min)  86  (Pended)   -NW     Posttreatment Heart Rate (beats/min)  89  (Pended)   -NW     Pre SpO2 (%)  99  (Pended)   -NW     O2 Delivery Pre Treatment  room air  (Pended)   -NW     Post SpO2 (%)  98  (Pended)   -NW     O2 Delivery Post Treatment  room air  (Pended)   -NW     Pre Patient Position  Supine  (Pended)   -NW     Intra Patient Position  Standing  (Pended)   -NW     Post Patient Position  Sitting  (Pended)   -     Row Name 07/22/19 0858          Physical  Therapy Goals    Safety Awareness Goal Selection (PT)  safety awareness, PT goal 1  (Pended)   -NW     Additional Documentation  --  (Pended)   -     Row Name 07/22/19 0858          Safety Awareness Goal 1 (PT)    Activity (Safety Awareness Goal 1, PT)  awareness of need for assistance;judgment;demonstrates compliance;demonstration of safe behaviors;demonstrates understanding  (Pended)   -NW     [place]/Cues/Accuracy (Safety Awareness Goal 1, PT)  independent  (Pended)   -NW     Time Frame (Safety Awareness Goal 1, PT)  short term goal (STG);1 day  (Pended)   -NW     Progress/Outcome (Safety Awareness Goal 1, PT)  goal met  (Pended)   -NW     Row Name 07/22/19 0858          Patient Education Goal (PT)    Time Frame (Patient Education Goal, PT)  --  (Pended)   -NW     Progress/Outcome (Patient Education Goal, PT)  --  (Pended)   -     Row Name 07/22/19 0858          Positioning and Restraints    Pre-Treatment Position  in bed  (Pended)   -NW     Post Treatment Position  wheelchair  (Pended)   -NW     In Wheelchair  notified nsg;sitting;call light within reach;encouraged to call for assist;with family/caregiver  (Pended)   -     Row Name 07/22/19 0858          Physical Therapy Discharge Summary    Additional Documentation  Discharge Summary, PT Eval (Group)  (Pended)   -     Row Name 07/22/19 0858          Discharge Summary, PT Eval    Reason for Discharge (PT Discharge Summary)  no further needs identified  (Pended)   -NW     Outcomes Achieved Upon Discharge (PT Discharge Summary)  evaluation only  (Pended)   -NW       User Key  (r) = Recorded By, (t) = Taken By, (c) = Cosigned By    Initials Name Provider Type    Joshua Albert, PT Student PT Student          Physical Therapy Education     Title: PT OT SLP Therapies (Done)     Topic: Physical Therapy (Done)     Point: Mobility training (Done)     Learning Progress Summary           Patient Acceptance, E,D, VU by HILARY at 7/22/2019  8:58 AM   Significant  Other Acceptance, E,D, VU by  at 7/22/2019  8:58 AM                   Point: Home exercise program (Done)     Learning Progress Summary           Patient Acceptance, E,D, VU by NW at 7/22/2019  8:58 AM   Significant Other Acceptance, E,D, VU by NW at 7/22/2019  8:58 AM                   Point: Body mechanics (Done)     Learning Progress Summary           Patient Acceptance, E,D, VU by NW at 7/22/2019  8:58 AM   Significant Other Acceptance, E,D, VU by NW at 7/22/2019  8:58 AM                   Point: Precautions (Done)     Learning Progress Summary           Patient Acceptance, E,D, VU by NW at 7/22/2019  8:58 AM   Significant Other Acceptance, E,D, VU by  at 7/22/2019  8:58 AM                               User Key     Initials Effective Dates Name Provider Type Discipline     05/03/19 -  Joshua Rice, PT Student PT Student PT                PT Recommendation and Plan  Anticipated Discharge Disposition (PT): (P) home with assist  Therapy Frequency (PT Clinical Impression): (P) evaluation only  Outcome Summary/Treatment Plan (PT)  Anticipated Discharge Disposition (PT): (P) home with assist  Reason for Discharge (PT Discharge Summary): (P) no further needs identified  Plan of Care Reviewed With: (P) patient, significant other  Outcome Summary: (P) PT I.E. Pt demonstrates safe independence with functional mobility. No deficits noted in functional strength, balance, gait, or endurance. Amb 400', standby assist + 1 for equipment. Pt appears at baseline function despite reported incisional pain. Pt not a candidate for IPPT provided his current functional status. Will d/c order accordingly. Recommend discharge to home with assist.    Outcome Measures     Row Name 07/22/19 0858             How much help from another person do you currently need...    Turning from your back to your side while in flat bed without using bedrails?  3  (Pended)   -NW      Moving from lying on back to sitting on the side of a flat bed  without bedrails?  3  (Pended)   -NW      Moving to and from a bed to a chair (including a wheelchair)?  3  (Pended)   -NW      Standing up from a chair using your arms (e.g., wheelchair, bedside chair)?  3  (Pended)   -NW      Climbing 3-5 steps with a railing?  3  (Pended)   -NW      To walk in hospital room?  3  (Pended)   -NW      AM-PAC 6 Clicks Score (PT)  18  (Pended)   -NW         Functional Assessment    Outcome Measure Options  AM-PAC 6 Clicks Basic Mobility (PT)  (Pended)   -NW        User Key  (r) = Recorded By, (t) = Taken By, (c) = Cosigned By    Initials Name Provider Type     Joshua Rice, PT Student PT Student           Time Calculation:   PT Charges     Row Name 07/22/19 0858             Time Calculation    Start Time  0858  (Pended)   -NW      PT Received On  07/22/19  (Pended)   -NW      PT Goal Re-Cert Due Date  08/01/19  (Pended)   -NW         Time Calculation- PT    Total Timed Code Minutes- PT  8 minute(s)  (Pended)   -NW         Timed Charges    95737 - PT Therapeutic Activity Minutes  8  (Pended)   -NW        User Key  (r) = Recorded By, (t) = Taken By, (c) = Cosigned By    Initials Name Provider Type    Joshua Albert, PT Student PT Student        Therapy Charges for Today     Code Description Service Date Service Provider Modifiers Qty    [id number]  PT THERAPEUTIC ACT EA 15 MIN 7/22/2019 Joshua Rice, PT Student GP 1    [id number]  PT EVAL LOW COMPLEXITY 4 7/22/2019 Joshua Rice, PT Student GP 1          PT G-Codes  Outcome Measure Options: (P) AM-PAC 6 Clicks Basic Mobility (PT)  AM-PAC 6 Clicks Score (PT): (P) 18    PT Discharge Summary  Anticipated Discharge Disposition (PT): (P) home with assist  Reason for Discharge: (P) At baseline function  Outcomes Achieved: (P) Refer to plan of care for updates on goals achieved    Joshua Rice PT Student  7/22/2019

## 2019-07-23 VITALS
DIASTOLIC BLOOD PRESSURE: 64 MMHG | RESPIRATION RATE: 18 BRPM | HEIGHT: 72 IN | HEART RATE: 77 BPM | WEIGHT: 155.87 LBS | SYSTOLIC BLOOD PRESSURE: 99 MMHG | OXYGEN SATURATION: 97 % | TEMPERATURE: 98.8 F | BODY MASS INDEX: 21.11 KG/M2

## 2019-07-23 LAB
ANION GAP SERPL CALCULATED.3IONS-SCNC: 10 MMOL/L (ref 5–15)
BASOPHILS # BLD AUTO: 0.05 10*3/MM3 (ref 0–0.2)
BASOPHILS NFR BLD AUTO: 0.7 % (ref 0–1.5)
BUN BLD-MCNC: 12 MG/DL (ref 6–20)
BUN/CREAT SERPL: 14.3 (ref 7–25)
CALCIUM SPEC-SCNC: 8.3 MG/DL (ref 8.6–10.5)
CHLORIDE SERPL-SCNC: 105 MMOL/L (ref 98–107)
CO2 SERPL-SCNC: 25 MMOL/L (ref 22–29)
CREAT BLD-MCNC: 0.84 MG/DL (ref 0.76–1.27)
DEPRECATED RDW RBC AUTO: 52.3 FL (ref 37–54)
EOSINOPHIL # BLD AUTO: 0.27 10*3/MM3 (ref 0–0.4)
EOSINOPHIL NFR BLD AUTO: 3.9 % (ref 0.3–6.2)
ERYTHROCYTE [DISTWIDTH] IN BLOOD BY AUTOMATED COUNT: 15.7 % (ref 12.3–15.4)
GFR SERPL CREATININE-BSD FRML MDRD: 94 ML/MIN/1.73
GLUCOSE BLD-MCNC: 132 MG/DL (ref 65–99)
HAV AB SER QL IA: POSITIVE
HCT VFR BLD AUTO: 26.2 % (ref 37.5–51)
HGB BLD-MCNC: 8 G/DL (ref 13–17.7)
IMM GRANULOCYTES # BLD AUTO: 0.05 10*3/MM3 (ref 0–0.05)
IMM GRANULOCYTES NFR BLD AUTO: 0.7 % (ref 0–0.5)
LYMPHOCYTES # BLD AUTO: 2.41 10*3/MM3 (ref 0.7–3.1)
LYMPHOCYTES NFR BLD AUTO: 35.2 % (ref 19.6–45.3)
MAGNESIUM SERPL-MCNC: 2.2 MG/DL (ref 1.6–2.6)
MCH RBC QN AUTO: 28.1 PG (ref 26.6–33)
MCHC RBC AUTO-ENTMCNC: 30.5 G/DL (ref 31.5–35.7)
MCV RBC AUTO: 91.9 FL (ref 79–97)
MONOCYTES # BLD AUTO: 0.68 10*3/MM3 (ref 0.1–0.9)
MONOCYTES NFR BLD AUTO: 9.9 % (ref 5–12)
NEUTROPHILS # BLD AUTO: 3.39 10*3/MM3 (ref 1.7–7)
NEUTROPHILS NFR BLD AUTO: 49.6 % (ref 42.7–76)
NRBC BLD AUTO-RTO: 0 /100 WBC (ref 0–0.2)
PLATELET # BLD AUTO: 250 10*3/MM3 (ref 140–450)
PMV BLD AUTO: 10.1 FL (ref 6–12)
POTASSIUM BLD-SCNC: 4.3 MMOL/L (ref 3.5–5.2)
RBC # BLD AUTO: 2.85 10*6/MM3 (ref 4.14–5.8)
SODIUM BLD-SCNC: 140 MMOL/L (ref 136–145)
WBC NRBC COR # BLD: 6.85 10*3/MM3 (ref 3.4–10.8)

## 2019-07-23 PROCEDURE — 83735 ASSAY OF MAGNESIUM: CPT | Performed by: INTERNAL MEDICINE

## 2019-07-23 PROCEDURE — 99239 HOSP IP/OBS DSCHRG MGMT >30: CPT | Performed by: INTERNAL MEDICINE

## 2019-07-23 PROCEDURE — 85025 COMPLETE CBC W/AUTO DIFF WBC: CPT | Performed by: INTERNAL MEDICINE

## 2019-07-23 PROCEDURE — 80048 BASIC METABOLIC PNL TOTAL CA: CPT | Performed by: INTERNAL MEDICINE

## 2019-07-23 PROCEDURE — 94799 UNLISTED PULMONARY SVC/PX: CPT

## 2019-07-23 RX ORDER — FERROUS SULFATE 325(65) MG
325 TABLET ORAL
Qty: 30 TABLET | Refills: 0 | Status: SHIPPED | OUTPATIENT
Start: 2019-07-24

## 2019-07-23 RX ORDER — PANTOPRAZOLE SODIUM 40 MG/1
40 TABLET, DELAYED RELEASE ORAL 2 TIMES DAILY
Qty: 60 TABLET | Refills: 0 | Status: SHIPPED | OUTPATIENT
Start: 2019-07-23

## 2019-07-23 RX ORDER — HYDROCODONE BITARTRATE AND ACETAMINOPHEN 5; 325 MG/1; MG/1
1 TABLET ORAL EVERY 4 HOURS PRN
Qty: 18 TABLET | Refills: 0 | Status: SHIPPED | OUTPATIENT
Start: 2019-07-23 | End: 2019-08-01

## 2019-07-23 RX ADMIN — POLYETHYLENE GLYCOL 3350 17 G: 17 POWDER, FOR SOLUTION ORAL at 08:28

## 2019-07-23 RX ADMIN — HYDROCODONE BITARTRATE AND ACETAMINOPHEN 1 TABLET: 5; 325 TABLET ORAL at 11:46

## 2019-07-23 RX ADMIN — ACETAMINOPHEN 650 MG: 325 TABLET, FILM COATED ORAL at 09:32

## 2019-07-23 RX ADMIN — IPRATROPIUM BROMIDE AND ALBUTEROL SULFATE 3 ML: 2.5; .5 SOLUTION RESPIRATORY (INHALATION) at 02:46

## 2019-07-23 RX ADMIN — IPRATROPIUM BROMIDE AND ALBUTEROL SULFATE 3 ML: 2.5; .5 SOLUTION RESPIRATORY (INHALATION) at 06:58

## 2019-07-23 RX ADMIN — SODIUM CHLORIDE, PRESERVATIVE FREE 3 ML: 5 INJECTION INTRAVENOUS at 08:34

## 2019-07-23 RX ADMIN — HYDROCODONE BITARTRATE AND ACETAMINOPHEN 1 TABLET: 5; 325 TABLET ORAL at 06:19

## 2019-07-23 RX ADMIN — FERROUS SULFATE TAB 325 MG (65 MG ELEMENTAL FE) 325 MG: 325 (65 FE) TAB at 08:28

## 2019-07-23 RX ADMIN — PANTOPRAZOLE SODIUM 40 MG: 40 INJECTION, POWDER, FOR SOLUTION INTRAVENOUS at 08:28

## 2019-07-23 RX ADMIN — IPRATROPIUM BROMIDE AND ALBUTEROL SULFATE 3 ML: 2.5; .5 SOLUTION RESPIRATORY (INHALATION) at 12:02

## 2019-07-23 NOTE — PROGRESS NOTES
Continued Stay Note  [facility]     Patient Name: Jaime Connell  MRN: [id number]  Today's Date: 7/23/2019    Admit Date: 7/19/2019    Discharge Plan     Row Name 07/23/19 1319       Plan    Plan Comments  SW spent 20 minutes on the phone attempted to schedule transportation for hospital discharge today. SW was still wokring with BUS transportation when pt's RN called and reported pt and pt's significant other left and therefore do no tneed transportation arranged.     Row Name 07/23/19 1203       Plan    Plan Comments  SW received consult for pt's electricity being turned off. SW spoke with pt at bedside. Pt reports his electricity is going to be turn off soon. SW provided financial resources for pt  that can assist with bill paying in [place]. SW also provided the number and information for medicaid transportation to pt. Pt reports that he also needs a letter faxed to his  with the dates he was in the hopsital.  is Faizan Lucero and his fax is [phone]. DANIELE faxed letter stating pt was hospitalized at [facility] from 7/19-7/23. Pt and pt's girlfriend also requesting to use the medicaid transportation service to get home today. DANIELE notified RN that SW will need to be notified when pt completely ready for discharge in order to call BUS [phone] to arrange transprotation. DANIELE already explained to pt's girlfriend that she may need to pay for the service.     Row Name 07/23/19 1101       Plan    Plan  d/c update    Patient/Family in Agreement with Plan  yes    Final Discharge Disposition Code  01 - home or self-care    Final Note  Pt appt has been briseida Ray at 8:45am on 7/30. Pt transportation will be arranged via  at d/c. CM has let RN know. No other needs at this time.        Discharge Codes    No documentation.       Expected Discharge Date and Time     Expected Discharge Date Expected Discharge Time    Jul 23, 2019             FRANSICO Santiago

## 2019-07-23 NOTE — PROGRESS NOTES
Continued Stay Note  [facility]     Patient Name: Jaime Connell  MRN: [id number]  Today's Date: 7/23/2019    Admit Date: 7/19/2019    Discharge Plan     Row Name 07/23/19 1203       Plan    Plan Comments  SW received consult for pt's electricity being turned off. DANIELE spoke with pt at bedside. Pt reports his electricity is going to be turn off soon. SW provided financial resources for pt  that can assist with bill paying in [place]. SW also provided the number and information for medicaid transportation to pt. Pt reports that he also needs a letter fax[facility]  is Faizan Lucero and his fax is [phone]. DANIELE faxed letter stating pt was hospitalized at [facility] from 7/19-7/23. Pt and pt's girlfriend also requesting to use the medicaid transportation service to get home today. DANIELE notified RN that SW will need to be notified when pt completely ready for discharge in order to call BUS [phone] to arrange transprotation. DANIELE already explained to pt's girlfriend that she may need to pay for the service.     Row Name 07/23/19 8743       Plan    Plan  d/c update    Patient/Family in Agreement with Plan  yes    Final Discharge Disposition Code  01 - home or self-care    Final Note  Pt appt has been briseida w Dr. ISADORA Ray at 8:45am on 7/30. Pt transportation will be arranged via  at d/c. CM has let RN know. No other needs at this time.        Discharge Codes    No documentation.       Expected Discharge Date and Time     Expected Discharge Date Expected Discharge Time    Jul 23, 2019             FRANSICO Santiago

## 2019-07-23 NOTE — PROGRESS NOTES
Case Management Discharge Note    Final Note: Pt appt has been briseida w Dr. ISADORA Ray at 8:45am on 7/30. Pt transportation will be arranged via  at d/c. CM has let RN know. No other needs at this time.    Destination      No service has been selected for the patient.      Durable Medical Equipment      No service has been selected for the patient.      Dialysis/Infusion      No service has been selected for the patient.      Home Medical Care      No service has been selected for the patient.      Therapy      No service has been selected for the patient.      Community Resources      No service has been selected for the patient.             Final Discharge Disposition Code: 01 - home or self-care

## 2019-07-23 NOTE — DISCHARGE SUMMARY
[facility] Medicine Services  DISCHARGE SUMMARY    Patient Name: Jaime Connell  : 1960  MRN: [id number]    Date of Admission: 2019  Date of Discharge:  19  Primary Care Physician: Provider, No Known    Consults     Date and Time Order Name Status Description    2019 0219 Inpatient Gastroenterology Consult Completed           Hospital Course     Presenting Problem:   Acute gastric ulcer with hemorrhage and perforation (CMS/HCC) [K25.2]    Active Hospital Problems    Diagnosis  POA   • **Large penetrating prepyloric gastric ulcer. Clip placed 2019 (CMS/HCC) [K25.2]  Yes     Priority: High   • Smoker [F17.200]  Yes     Priority: Medium   • Acute blood loss anemia [D50.0]  Yes     Priority: Medium   • Hepatitis C antibody test positive [R76.8]  Yes     Priority: Medium      Resolved Hospital Problems   No resolved problems to display.          Hospital Course:  Jaime Connell is a 59 y.o. male with PMH of GERD and ongoing tobacco abuse presented with several episodes of hematemesis and was initially admitted to the ICU service.  Pt was given 2L IVFs and 2 units PRBCs. A CTA of the abdomen was performed and showed possible gastric ulcer with contained perforation.  Pt was seen by Gastroenterology and went for EGD. He had a large penetrating ulcer in the prepyloric area with clot s/p clip placement.  His H pylori was negative.  He will need to continue on BID PPI for at least one month and will need daily iron replacement. He will need to follow up with GI in one month. Of note, he had a positive Hep C Ab upon admission- he will follow up with GI for this as well.  He is anxious to return home today.       Discharge Follow Up Recommendations for labs/diagnostics:  With PCP in one week, with GI in one month.     Day of Discharge     HPI:   Doing well this am, no issues overnight. He and his wife are requesting SW consult prior to leaving.     Review of Systems  Gen-  No fevers, chills  CV- No chest pain, palpitations  Resp- No cough, dyspnea  GI- No N/V/D, abd pain    Otherwise ROS is negative except as mentioned in the HPI.    Vital Signs:   Temp:  [97.8 °F (36.6 °C)-98.8 °F (37.1 °C)] 98.8 °F (37.1 °C)  Heart Rate:  [72-84] 77  Resp:  [16-18] 16  BP: ()/(50-64) 99/64     Physical Exam:  Constitutional: No acute distress, awake, alert  HENT: NCAT, mucous membranes moist  Respiratory: Clear to auscultation bilaterally, respiratory effort normal   Cardiovascular: RRR, no murmurs, rubs, or gallops, palpable pedal pulses bilaterally  Gastrointestinal: Positive bowel sounds, soft, nontender, nondistended  Musculoskeletal: No bilateral ankle edema  Psychiatric: Appropriate affect, cooperative  Neurologic: Oriented x 3, strength symmetric in all extremities, Cranial Nerves grossly intact to confrontation, speech clear  Skin: No rashes  Pertinent  and/or Most Recent Results     Results from last 7 days   Lab Units 07/23/19  0559 07/22/19  0544 07/21/19  1203 07/21/19  0629 07/21/19  0011 07/20/19  1807 07/20/19  0611 07/19/19  2357   WBC 10*3/mm3 6.85 7.03  --  7.02  --   --  8.07 10.31   HEMOGLOBIN g/dL 8.0* 8.0* 8.7* 8.4*  8.4* 8.4* 8.5* 9.1* 9.5*   HEMATOCRIT % 26.2* 25.9* 27.0* 27.4*  27.4* 27.5* 28.4* 29.0* 30.9*   PLATELETS 10*3/mm3 250 238  --  258  --   --  281 361   SODIUM mmol/L 140 139  --  138  --   --  141 140   POTASSIUM mmol/L 4.3 4.6  --  4.8  --   --  4.4 4.2   CHLORIDE mmol/L 105 104  --  104  --   --  108* 100   CO2 mmol/L 25.0 28.0  --  27.0  --   --  26.0 31.0*   BUN mg/dL 12 12  --  19  --   --  34* 32*   CREATININE mg/dL 0.84 0.94  --  0.87  --   --  0.94 1.00   GLUCOSE mg/dL 132* 168*  --  97  --   --  107* 178*   CALCIUM mg/dL 8.3* 8.1*  --  8.5*  --   --  8.0* 9.0     Results from last 7 days   Lab Units 07/22/19  0544 07/21/19  0629 07/19/19  2357   BILIRUBIN mg/dL <0.2* 0.3 0.3   ALK PHOS U/L 65 61 78   ALT (SGPT) U/L 78* 86* 100*   AST (SGOT) U/L  68* 87* 114*     Results from last 7 days   Lab Units 07/22/19  0544   CHOLESTEROL mg/dL 66   TRIGLYCERIDES mg/dL 52     Results from last 7 days   Lab Units 07/20/19  0611 07/19/19  2357   TSH mIU/mL 0.395  --    HEMOGLOBIN A1C % 5.90*  --    PROBNP pg/mL  --  171.8   TROPONIN T ng/mL  --  <0.010   PROCALCITONIN ng/mL  --  0.12       Brief Urine Lab Results     None          Microbiology Results Abnormal     Procedure Component Value - Date/Time    Blood Culture - Blood, Arm, Right [[id number]] Collected:  07/20/19 0019    Lab Status:  Preliminary result Specimen:  Blood from Arm, Right Updated:  07/23/19 0245     Blood Culture No growth at 3 days    Blood Culture - Blood, Arm, Left [[id number]] Collected:  07/20/19 0019    Lab Status:  Preliminary result Specimen:  Blood from Arm, Left Updated:  07/23/19 0245     Blood Culture No growth at 3 days          Imaging Results (all)     Procedure Component Value Units Date/Time    XR Chest PA & Lateral [[id number]] Collected:  07/22/19 1017     Updated:  07/22/19 1807    Narrative:       EXAMINATION: XR CHEST PA AND LATERAL-      INDICATION: COPD and coarse bilateral rhonchi; K25.2-Acute gastric ulcer  with both hemorrhage and perforation; K92.0-Hematemesis; I95.89-Other  hypotension; R11.2-Nausea with vomiting, unspecified.      COMPARISON: Chest x-ray 07/20/2019.     FINDINGS: Cardiac size within normal limits.  No overt edema, however,  increased perihilar lung markings with questionable peribronchial  cuffing consistent with peribronchial inflammatory process. No focal  consolidation separate from this. No pneumothorax or significant  effusion. Degenerative changes of the spine.       Impression:       Findings consistent with bronchitis and/or reactive airways  disease as there is increased prominence of the perihilar lung markings  without focal consolidation.     D:  07/22/2019  E:  07/22/2019     This report was finalized on 7/22/2019 6:04 PM by Dr. Sylvester Quick.        XR Abdomen KUB [[id number]] Collected:  07/20/19 0238     Updated:  07/20/19 0240    Narrative:       CR Abdomen 1 Vw    INDICATION:   59-year-old male with nasogastric tube placement today.    COMPARISON:   None available    FINDINGS:  AP radiographs of the abdomen. Interval placement of a nasogastric tube with tip projecting over the gastric fundus. Bowel gas pattern is nonobstructive. Renal contrast excretion.      Impression:       Nasogastric tube placement with tip projecting over the gastric fundus.    Signer Name: Reynaldo Church MD   Signed: 7/20/2019 2:38 AM   Workstation Name: [email or web address]       CT Angiogram Abdomen Pelvis With & Without Contrast [[id number]] Collected:  07/20/19 0131     Updated:  07/20/19 0134    Narrative:       CTA Abdomen Pelvis    INDICATION:   59-year-old male with hematemesis today. Nausea and vomiting. Known gastric ulcers.    TECHNIQUE:   CT angiogram of the abdomen and pelvis with contrast. 3-D postprocessing was performed and reviewed. Radiation dose reduction techniques included automated exposure control or exposure modulation based on body size. Count of known CT and cardiac nuc med  studies performed in previous 12 months: 4.     COMPARISON:   None available.    FINDINGS:   Visualized lung bases are unremarkable.    AORTA: The abdominal aorta is normal in course and caliber with mild atherosclerotic calcification. No dissection. Celiac axis, superior mesenteric artery, inferior mesenteric artery, and renal arteries are widely patent.    ABDOMEN:  There is a focal area of thinning/irregularity involving the posterior superior aspect of the gastric wall in the region of the distal body/antrum. This appears to communicate with an ill-defined collection of extraluminal fluid and gas in the  retroperitoneum between the gastric antrum and pancreatic head, measuring approximately 2 x 2.2 cm. This is highly concerning for a contained perforated gastric ulcer. There is hyperdense  material noted throughout the stomach, suggestive of blood  products. No contrast extravasation seen within the stomach or abdomen. The liver, spleen, pancreas, gallbladder, both adrenal glands, and both kidneys appear within normal limits. Small bowel is unremarkable without obstruction. Appendix not clearly  visualized. No pericecal inflammation. Moderate stool burden throughout the colon. Colon otherwise unremarkable.    Pelvis:  The urinary bladder and prostate gland are unremarkable. No free pelvic fluid.    No acute osseous abnormality. Advanced degenerative changes throughout the lumbar spine.      Impression:         1. Focal area of thinning/irregularity involving the posterior superior aspect of the gastric wall in the region of the distal body/antrum which appears to communicate with an ill-defined collection of extraluminal fluid and gas in the retroperitoneum  between the gastric antrum and pancreatic head, measuring approximately 2 x 2.2 cm. This is highly concerning for a contained perforated gastric ulcer. Hyperdense material noted throughout the stomach, suggestive of hemorrhage. No active contrast  extravasation seen within the stomach or abdomen.  2. Abdominal aorta normal in course and caliber. No dissection. Mesenteric vessels and renal arteries appear patent.  3. Appendix not clearly visualized. No pericecal inflammation.  4. Moderate stool burden.    NOTIFICATION: Critical Value/emergent results were called by telephone at the time of interpretation on 7/20/2019 1:28 AM to Vandana Brown MD who verbally acknowledged these results.    Signer Name: Reynaldo Church MD   Signed: 7/20/2019 1:31 AM   Workstation Name: [email or web address]       XR Chest 1 View [[id number]] Collected:  07/20/19 0044     Updated:  07/20/19 0047    Narrative:       CR Chest 1 Vw    INDICATION:   59-year-old male with hematemesis beginning tonight.     COMPARISON:    Chest x-ray 7/2/2019.    FINDINGS:  Single portable AP view of  the chest.  The heart and mediastinal contours are normal. The lungs are clear. No pneumothorax or pleural effusion.      Impression:       No acute cardiopulmonary findings.    Signer Name: Reynaldo Church MD   Signed: 7/20/2019 12:44 AM   Workstation Name: [email or web address]                             Order Current Status    Hepatitis C RNA, Quantitative, PCR (graph) In process    Blood Culture - Blood, Arm, Left Preliminary result    Blood Culture - Blood, Arm, Right Preliminary result        Discharge Details        Discharge Medications      New Medications      Instructions Start Date   ferrous sulfate 325 (65 FE) MG tablet   325 mg, Oral, Daily With Breakfast   Start Date:  7/24/2019     HYDROcodone-acetaminophen 5-325 MG per tablet  Commonly known as:  NORCO   1 tablet, Oral, Every 4 Hours PRN      pantoprazole 40 MG EC tablet  Commonly known as:  PROTONIX   40 mg, Oral, 2 Times Daily             Allergies   Allergen Reactions   • Penicillins Unknown (See Comments)     Pt unable to verify.         Discharge Disposition:  Home or Self Care    Discharge Diet:  Diet Order   Procedures   • Diet Regular; GI Soft / [place]         Discharge Activity:         CODE STATUS:    Code Status and Medical Interventions:   Ordered at: 07/20/19 0219     Code Status:    CPR     Medical Interventions (Level of Support Prior to Arrest):    Full         Future Appointments   Date Time Provider Department Center   7/30/2019  8:45 AM Rebekah Ray APRN [facility]  [facility] None       Additional Instructions for the Follow-ups that You Need to Schedule     Discharge Follow-up with PCP   As directed       Currently Documented PCP:    Provider, No Known    PCP Phone Number:    None     Follow Up Details:  in one week with PCP         Discharge Follow-up with Specified Provider: SOFI; 1 Month   As directed      To:  [facility]    Follow Up:  1 Month               Time Spent on Discharge:  35 minutes    Electronically signed by Uzma Reynoso MD,  07/23/19, 11:29 AM.

## 2019-07-23 NOTE — NURSING NOTE
"Pt rested well overnight. Became very upset at 2000 because I informed the patient that dilaudid is normally not given upon discharge and that he needed to try to keep his pain meds PO. He began stating that he would not receive any treatment at all because of this and that he would just, \"go out and get street drugs\" instead. I was able to calm the patient down. Called provider and changed pain meds over to norco. Patient rested the rest of the night and seemed content. Patient determined to go home today.   "

## 2019-07-24 ENCOUNTER — TRANSITIONAL CARE MANAGEMENT TELEPHONE ENCOUNTER (OUTPATIENT)
Dept: INTERNAL MEDICINE | Facility: CLINIC | Age: 59
End: 2019-07-24

## 2019-07-24 ENCOUNTER — READMISSION MANAGEMENT (OUTPATIENT)
Dept: CALL CENTER | Facility: HOSPITAL | Age: 59
End: 2019-07-24

## 2019-07-24 LAB
HCV RNA SERPL NAA+PROBE-ACNC: NORMAL IU/ML
HCV RNA SERPL NAA+PROBE-LOG IU: 4.06 LOG10 IU/ML
TEST INFORMATION: NORMAL

## 2019-07-24 NOTE — OUTREACH NOTE
Prep Survey      Responses   Facility patient discharged from?  [place]   Is patient eligible?  Yes   Discharge diagnosis  Large penetrating prepyloric gastric ulcer. Clip placed.   Does the patient have one of the following disease processes/diagnoses(primary or secondary)?  Other   Does the patient have Home health ordered?  No   Is there a DME ordered?  No   Prep survey completed?  Yes          Adriana Escoto RN

## 2019-07-25 ENCOUNTER — READMISSION MANAGEMENT (OUTPATIENT)
Dept: CALL CENTER | Facility: HOSPITAL | Age: 59
End: 2019-07-25

## 2019-07-25 LAB
BACTERIA SPEC AEROBE CULT: NORMAL
BACTERIA SPEC AEROBE CULT: NORMAL

## 2019-07-25 NOTE — OUTREACH NOTE
Medical Week 1 Survey      Responses   Facility patient discharged from?  [place]   Does the patient have one of the following disease processes/diagnoses(primary or secondary)?  Other   Is there a successful TCM telephone encounter documented?  Yes          Gay Espinosa RN

## 2019-07-26 NOTE — OUTREACH NOTE
Multiple attempts have been made to contact pt to complete VINCE call. No further attempts will be made. Pt has a new pcp appt with Rebekah NOONAN 7/30/19 and TCM is applicable. Appt reminder mailed.

## 2019-08-02 ENCOUNTER — READMISSION MANAGEMENT (OUTPATIENT)
Dept: CALL CENTER | Facility: HOSPITAL | Age: 59
End: 2019-08-02

## 2019-08-02 NOTE — OUTREACH NOTE
Medical Week 2 Survey      Responses   Facility patient discharged from?  [place]   Does the patient have one of the following disease processes/diagnoses(primary or secondary)?  Other   Week 2 attempt successful?  Yes   Call start time  0900   Discharge diagnosis  Large penetrating prepyloric gastric ulcer. Clip placed.   Call end time  0906   Is patient permission given to speak with other caregiver?  Yes   List who call center can speak with  Catrachita, emergency contact   Person spoke with today (if not patient) and relationship  Catrahcita   Meds reviewed with patient/caregiver?  Yes   Is the patient taking all medications as directed (includes completed medication regime)?  Yes   Has the patient kept scheduled appointments due by today?  N/A   Comments  Reviewed upcoming GI appt with Dr Lovell for Thurs Aug 29 245pm.    Has home health visited the patient within 72 hours of discharge?  N/A   Psychosocial issues?  No   Did the patient receive a copy of their discharge instructions?  Yes   Nursing interventions  Reviewed instructions with patient   What is the patient's perception of their health status since discharge?  Improving   Is the patient/caregiver able to teach back signs and symptoms related to disease process for when to call PCP?  Yes   Is the patient/caregiver able to teach back signs and symptoms related to disease process for when to call 911?  Yes   Is the patient/caregiver able to teach back the hierarchy of who to call/visit for symptoms/problems? PCP, Specialist, Home health nurse, Urgent Care, ED, 911  Yes   Week 2 Call Completed?  Yes          Kadie Patton RN

## 2019-08-09 ENCOUNTER — READMISSION MANAGEMENT (OUTPATIENT)
Dept: CALL CENTER | Facility: HOSPITAL | Age: 59
End: 2019-08-09

## 2019-08-09 NOTE — OUTREACH NOTE
Medical Week 3 Survey      Responses   Facility patient discharged from?  [place]   Does the patient have one of the following disease processes/diagnoses(primary or secondary)?  Other   Week 3 attempt successful?  No   Unsuccessful attempts  Attempt 1          Janette Fatima RN

## 2019-08-12 ENCOUNTER — READMISSION MANAGEMENT (OUTPATIENT)
Dept: CALL CENTER | Facility: HOSPITAL | Age: 59
End: 2019-08-12

## 2019-08-12 NOTE — OUTREACH NOTE
Medical Week 3 Survey      Responses   Facility patient discharged from?  [place]   Does the patient have one of the following disease processes/diagnoses(primary or secondary)?  Other   Week 3 attempt successful?  No   Unsuccessful attempts  Attempt 2          David Michel RN

## 2019-09-24 ENCOUNTER — TELEPHONE (OUTPATIENT)
Dept: GASTROENTEROLOGY | Facility: CLINIC | Age: 59
End: 2019-09-24

## 2019-09-24 NOTE — TELEPHONE ENCOUNTER
Patient rigo' called wanting medical information. I explained to her that I couldn't give out any information without a release of information signed by patient. Rigo' voiced understanding.

## 2020-01-24 ENCOUNTER — TELEPHONE (OUTPATIENT)
Dept: GASTROENTEROLOGY | Facility: CLINIC | Age: 60
End: 2020-01-24

## 2021-03-11 NOTE — CONSULTS
General Surgery Consultation Note    Date of Service: 7/20/2019  Jaime Connell  [id number]  1960      Referring Provider: Chris Patel MD    Location of Consult: ICU     Reason for Consultation: Bleeding ulcer       History of Present Illness:  I am seeing, Jaime Connell, in consultation for Chris Patel MD regarding gastrointestinal bleeding.  59-year-old gentleman presented to the emergency room last evening with hematemesis.  He was admitted to the ICU for supportive care and volume resuscitation.  He underwent upper endoscopy by Dr. Anderson, gastroenterology this morning.  This demonstrated a large prepyloric penetrating ulcer with clot yet no active bleeding.  The area was clipped and appeared satisfactory.  He is back into the ICU though somewhat groggy from sedation.     Problem List Items Addressed This Visit        Digestive    * (Principal) Acute gastric ulcer with hemorrhage and perforation (CMS/HCC) - Primary    Relevant Orders    Case Request (Completed)    Tissue Pathology Exam      Other Visit Diagnoses     Hematemesis with nausea        Hypotension due to blood loss        Nausea and vomiting in adult              Past Medical History:   Diagnosis Date   • Back pain        Past Surgical History:   • BACK SURGERY       Allergies   Allergen Reactions   • Penicillins Unknown (See Comments)     Pt unable to verify.       No current facility-administered medications on file prior to encounter.      No current outpatient medications on file prior to encounter.         Current Facility-Administered Medications:   •  HYDROmorphone (DILAUDID) injection 0.5 mg, 0.5 mg, Intravenous, Q2H PRN, Chris Patel MD, 0.5 mg at 07/20/19 1257  •  lactated ringers infusion, 100 mL/hr, Intravenous, Continuous, Chris Patel MD, Last Rate: 100 mL/hr at 07/20/19 1027, 100 mL/hr at 07/20/19 1027  •  pantoprazole (PROTONIX) 40 mg in sodium chloride 0.9 % 100 mL (0.4 mg/mL) infusion, 8  "mg/hr, Intravenous, Continuous, Vandana Brown MD, Last Rate: 20 mL/hr at 07/20/19 1241, 8 mg/hr at 07/20/19 1241  •  promethazine (PHENERGAN) injection 12.5 mg, 12.5 mg, Intravenous, Q6H PRN, Chris Patel MD, 12.5 mg at 07/20/19 0326  •  sodium chloride 0.9 % flush 10 mL, 10 mL, Intravenous, PRN, Vandana Brown MD  •  sodium chloride 0.9 % flush 3 mL, 3 mL, Intravenous, Q12H, Chris Patel MD, 3 mL at 07/20/19 0312  •  sodium chloride 0.9 % flush 3-10 mL, 3-10 mL, Intravenous, PRN, Chris Patel MD    No family history on file.  Social History     Socioeconomic History   • Marital status: Single     Spouse name: Not on file   • Number of children: Not on file   • Years of education: Not on file   • Highest education level: Not on file   Tobacco Use   • Smoking status: Current Every Day Smoker     Packs/day: 1.50     Types: Cigarettes   Substance and Sexual Activity   • Alcohol use: No     Frequency: Never   • Drug use: No       Review of Systems:  Unobtainable as the patient is still quite sedate from his procedure this morning, though he is arousable.    /69   Pulse 75   Temp 98.8 °F (37.1 °C) (Axillary)   Resp 16   Ht 182.9 cm (72.01\")   Wt 79.4 kg (175 lb 0.7 oz)   SpO2 94%   BMI 23.74 kg/m²   Body mass index is 23.74 kg/m².    General: Mild distress  HEENT: PER, no icterus, normal sclerae  Cardiac: regular rhythm,  no audible rubs  Pulmonary: bilateral breath sounds, non labored  Abdominal: Soft, no peritonitis no palpable hepatosplenomegaly  Neurologic: awake, alert, no obvious focal deficits  Extremities: warm, no edema  Skin: no obvious rashes nor worrisome lesions seen     CBC  Results from last 7 days   Lab Units 07/20/19  0611   WBC 10*3/mm3 8.07   HEMOGLOBIN g/dL 9.1*   HEMATOCRIT % 29.0*   PLATELETS 10*3/mm3 281       CMP  Results from last 7 days   Lab Units 07/20/19  0611 07/19/19  2357   SODIUM mmol/L 141 140   POTASSIUM mmol/L 4.4 4.2   CHLORIDE " mmol/L 108* 100   CO2 mmol/L 26.0 31.0*   BUN mg/dL 34* 32*   CREATININE mg/dL 0.94 1.00   CALCIUM mg/dL 8.0* 9.0   BILIRUBIN mg/dL  --  0.3   ALK PHOS U/L  --  78   ALT (SGPT) U/L  --  100*   AST (SGOT) U/L  --  114*   GLUCOSE mg/dL 107* 178*       Radiology  Imaging Results (last 72 hours)     Procedure Component Value Units Date/Time    XR Abdomen KUB [[id number]] Collected:  07/20/19 0238     Updated:  07/20/19 0240    Narrative:       CR Abdomen 1 Vw    INDICATION:   59-year-old male with nasogastric tube placement today.    COMPARISON:   None available    FINDINGS:  AP radiographs of the abdomen. Interval placement of a nasogastric tube with tip projecting over the gastric fundus. Bowel gas pattern is nonobstructive. Renal contrast excretion.      Impression:       Nasogastric tube placement with tip projecting over the gastric fundus.    Signer Name: Reynaldo Church MD   Signed: 7/20/2019 2:38 AM   Workstation Name: [id number]       CT Angiogram Abdomen Pelvis With & Without Contrast [[id number]] Collected:  07/20/19 0131     Updated:  07/20/19 0134    Narrative:       CTA Abdomen Pelvis    INDICATION:   59-year-old male with hematemesis today. Nausea and vomiting. Known gastric ulcers.    TECHNIQUE:   CT angiogram of the abdomen and pelvis with contrast. 3-D postprocessing was performed and reviewed. Radiation dose reduction techniques included automated exposure control or exposure modulation based on body size. Count of known CT and cardiac nuc med  studies performed in previous 12 months: 4.     COMPARISON:   None available.    FINDINGS:   Visualized lung bases are unremarkable.    AORTA: The abdominal aorta is normal in course and caliber with mild atherosclerotic calcification. No dissection. Celiac axis, superior mesenteric artery, inferior mesenteric artery, and renal arteries are widely patent.    ABDOMEN:  There is a focal area of thinning/irregularity involving the posterior superior aspect of the  gastric wall in the region of the distal body/antrum. This appears to communicate with an ill-defined collection of extraluminal fluid and gas in the  retroperitoneum between the gastric antrum and pancreatic head, measuring approximately 2 x 2.2 cm. This is highly concerning for a contained perforated gastric ulcer. There is hyperdense material noted throughout the stomach, suggestive of blood  products. No contrast extravasation seen within the stomach or abdomen. The liver, spleen, pancreas, gallbladder, both adrenal glands, and both kidneys appear within normal limits. Small bowel is unremarkable without obstruction. Appendix not clearly  visualized. No pericecal inflammation. Moderate stool burden throughout the colon. Colon otherwise unremarkable.    Pelvis:  The urinary bladder and prostate gland are unremarkable. No free pelvic fluid.    No acute osseous abnormality. Advanced degenerative changes throughout the lumbar spine.      Impression:         1. Focal area of thinning/irregularity involving the posterior superior aspect of the gastric wall in the region of the distal body/antrum which appears to communicate with an ill-defined collection of extraluminal fluid and gas in the retroperitoneum  between the gastric antrum and pancreatic head, measuring approximately 2 x 2.2 cm. This is highly concerning for a contained perforated gastric ulcer. Hyperdense material noted throughout the stomach, suggestive of hemorrhage. No active contrast  extravasation seen within the stomach or abdomen.  2. Abdominal aorta normal in course and caliber. No dissection. Mesenteric vessels and renal arteries appear patent.  3. Appendix not clearly visualized. No pericecal inflammation.  4. Moderate stool burden.    NOTIFICATION: Critical Value/emergent results were called by telephone at the time of interpretation on 7/20/2019 1:28 AM to Vandana Brown MD who verbally acknowledged these results.    Signer Name: Reynaldo Church  MD   Signed: 7/20/2019 1:31 AM   Workstation Name: [id number]       XR Chest 1 View [[id number]] Collected:  07/20/19 0044     Updated:  07/20/19 0047    Narrative:       CR Chest 1 Vw    INDICATION:   59-year-old male with hematemesis beginning tonight.     COMPARISON:    Chest x-ray 7/2/2019.    FINDINGS:  Single portable AP view of the chest.  The heart and mediastinal contours are normal. The lungs are clear. No pneumothorax or pleural effusion.      Impression:       No acute cardiopulmonary findings.    Signer Name: Reynaldo Church MD   Signed: 7/20/2019 12:44 AM   Workstation Name: [id number]               Assessment:  Prepyloric ulcer with bleeding  Hepatitis C    Plan:  Volume resuscitated with stable vital signs.  That is post transfusion.  No clear evidence of ongoing bleeding.  I discussed his EGD findings directly with Dr. Anderson.  I also discussed the patient directly with Dr. Lopez the intensivist regarding ongoing care.  He is going to get in touch with Dr. Cortés regarding the possibility of angioembolization should he rebleed.  I will follow along, call with questions.    Cortes William MD  07/20/19  1:08 PM     Burow's Graft Text: The defect edges were debeveled with a #15 scalpel blade.  Given the location of the defect, shape of the defect, the proximity to free margins and the presence of a standing cone deformity a Burow's skin graft was deemed most appropriate. The standing cone was removed and this tissue was then trimmed to the shape of the primary defect. The adipose tissue was also removed until only dermis and epidermis were left.  The skin margins of the secondary defect were undermined to an appropriate distance in all directions utilizing iris scissors.  The secondary defect was closed with interrupted buried subcutaneous sutures.  The skin edges were then re-apposed with running  sutures.  The skin graft was then placed in the primary defect and oriented appropriately.

## 2021-05-22 ENCOUNTER — APPOINTMENT (OUTPATIENT)
Dept: CT IMAGING | Facility: HOSPITAL | Age: 61
End: 2021-05-22

## 2021-05-22 ENCOUNTER — APPOINTMENT (OUTPATIENT)
Dept: GENERAL RADIOLOGY | Facility: HOSPITAL | Age: 61
End: 2021-05-22

## 2021-05-22 ENCOUNTER — HOSPITAL ENCOUNTER (INPATIENT)
Facility: HOSPITAL | Age: 61
LOS: 12 days | Discharge: LEFT BEFORE DISCHARGE | End: 2021-06-03
Attending: EMERGENCY MEDICINE | Admitting: SURGERY

## 2021-05-22 DIAGNOSIS — Z86.19 HISTORY OF HEPATITIS C: ICD-10-CM

## 2021-05-22 DIAGNOSIS — D62 ACUTE BLOOD LOSS ANEMIA: ICD-10-CM

## 2021-05-22 DIAGNOSIS — R41.82 ALTERED MENTAL STATUS, UNSPECIFIED ALTERED MENTAL STATUS TYPE: ICD-10-CM

## 2021-05-22 DIAGNOSIS — K92.2 ACUTE UPPER GI BLEED: Primary | ICD-10-CM

## 2021-05-22 DIAGNOSIS — K26.9 DUODENAL ULCER: ICD-10-CM

## 2021-05-22 DIAGNOSIS — Z87.11 HISTORY OF PEPTIC ULCER DISEASE: ICD-10-CM

## 2021-05-22 DIAGNOSIS — D75.9: ICD-10-CM

## 2021-05-22 DIAGNOSIS — R57.8 HEMORRHAGIC SHOCK (HCC): ICD-10-CM

## 2021-05-22 PROBLEM — R73.9 HYPERGLYCEMIA: Status: ACTIVE | Noted: 2021-05-22

## 2021-05-22 PROBLEM — R79.89 ELEVATED LACTIC ACID LEVEL: Status: ACTIVE | Noted: 2021-05-22

## 2021-05-22 PROBLEM — Z72.0 TOBACCO ABUSE: Status: ACTIVE | Noted: 2021-05-22

## 2021-05-22 PROBLEM — D68.9 COAGULOPATHY (HCC): Status: ACTIVE | Noted: 2021-05-22

## 2021-05-22 PROBLEM — G93.41 ENCEPHALOPATHY, METABOLIC: Status: ACTIVE | Noted: 2021-05-22

## 2021-05-22 PROBLEM — J96.02 ACUTE RESPIRATORY FAILURE WITH HYPERCAPNIA (HCC): Status: ACTIVE | Noted: 2021-05-22

## 2021-05-22 PROBLEM — J69.0 ASPIRATION PNEUMONITIS (HCC): Status: ACTIVE | Noted: 2021-05-22

## 2021-05-22 PROBLEM — N17.9 AKI (ACUTE KIDNEY INJURY) (HCC): Status: ACTIVE | Noted: 2021-05-22

## 2021-05-22 LAB
ABO GROUP BLD: NORMAL
ALBUMIN SERPL-MCNC: 3.2 G/DL (ref 3.5–5.2)
ALBUMIN/GLOB SERPL: 1.8 G/DL
ALP SERPL-CCNC: 61 U/L (ref 39–117)
ALT SERPL W P-5'-P-CCNC: 10 U/L (ref 1–41)
AMMONIA BLD-SCNC: 34 UMOL/L (ref 16–60)
ANION GAP SERPL CALCULATED.3IONS-SCNC: 15 MMOL/L (ref 5–15)
ARTERIAL PATENCY WRIST A: ABNORMAL
AST SERPL-CCNC: 17 U/L (ref 1–40)
ATMOSPHERIC PRESS: ABNORMAL MM[HG]
BACTERIA UR QL AUTO: ABNORMAL /HPF
BASE EXCESS BLDA CALC-SCNC: 1.9 MMOL/L (ref 0–2)
BASOPHILS # BLD AUTO: 0.09 10*3/MM3 (ref 0–0.2)
BASOPHILS NFR BLD AUTO: 0.9 % (ref 0–1.5)
BDY SITE: ABNORMAL
BILIRUB SERPL-MCNC: 0.3 MG/DL (ref 0–1.2)
BILIRUB UR QL STRIP: NEGATIVE
BLD GP AB SCN SERPL QL: NEGATIVE
BODY TEMPERATURE: 37 C
BUN SERPL-MCNC: 57 MG/DL (ref 8–23)
BUN/CREAT SERPL: 32.8 (ref 7–25)
CALCIUM SPEC-SCNC: 8.2 MG/DL (ref 8.6–10.5)
CHLORIDE SERPL-SCNC: 99 MMOL/L (ref 98–107)
CLARITY UR: CLEAR
CO2 BLDA-SCNC: 29.4 MMOL/L (ref 22–33)
CO2 SERPL-SCNC: 25 MMOL/L (ref 22–29)
COHGB MFR BLD: 1.5 % (ref 0–2)
COLOR UR: YELLOW
CREAT SERPL-MCNC: 1.74 MG/DL (ref 0.76–1.27)
D-LACTATE SERPL-SCNC: 5.1 MMOL/L (ref 0.5–2)
DEPRECATED RDW RBC AUTO: 46.8 FL (ref 37–54)
EOSINOPHIL # BLD AUTO: 0.13 10*3/MM3 (ref 0–0.4)
EOSINOPHIL NFR BLD AUTO: 1.3 % (ref 0.3–6.2)
EPAP: 0
ERYTHROCYTE [DISTWIDTH] IN BLOOD BY AUTOMATED COUNT: 13.2 % (ref 12.3–15.4)
ETHANOL BLD-MCNC: <10 MG/DL (ref 0–10)
GFR SERPL CREATININE-BSD FRML MDRD: 40 ML/MIN/1.73
GLOBULIN UR ELPH-MCNC: 1.8 GM/DL
GLUCOSE SERPL-MCNC: 189 MG/DL (ref 65–99)
GLUCOSE UR STRIP-MCNC: NEGATIVE MG/DL
HCO3 BLDA-SCNC: 27.9 MMOL/L (ref 20–26)
HCT VFR BLD AUTO: 30.5 % (ref 37.5–51)
HCT VFR BLD CALC: 29 %
HGB BLD-MCNC: 9.9 G/DL (ref 13–17.7)
HGB BLDA-MCNC: 9.5 G/DL (ref 13.5–17.5)
HGB UR QL STRIP.AUTO: NEGATIVE
HOLD SPECIMEN: NORMAL
HOLD SPECIMEN: NORMAL
HYALINE CASTS UR QL AUTO: ABNORMAL /LPF
IMM GRANULOCYTES # BLD AUTO: 0.11 10*3/MM3 (ref 0–0.05)
IMM GRANULOCYTES NFR BLD AUTO: 1.1 % (ref 0–0.5)
INHALED O2 CONCENTRATION: 60 %
INR PPP: 1.28 (ref 0.85–1.16)
IPAP: 0
KETONES UR QL STRIP: NEGATIVE
LEUKOCYTE ESTERASE UR QL STRIP.AUTO: NEGATIVE
LIPASE SERPL-CCNC: 28 U/L (ref 13–60)
LYMPHOCYTES # BLD AUTO: 3.59 10*3/MM3 (ref 0.7–3.1)
LYMPHOCYTES NFR BLD AUTO: 35.4 % (ref 19.6–45.3)
MCH RBC QN AUTO: 31.7 PG (ref 26.6–33)
MCHC RBC AUTO-ENTMCNC: 32.5 G/DL (ref 31.5–35.7)
MCV RBC AUTO: 97.8 FL (ref 79–97)
METHGB BLD QL: 0.4 % (ref 0–1.5)
MODALITY: ABNORMAL
MONOCYTES # BLD AUTO: 1.26 10*3/MM3 (ref 0.1–0.9)
MONOCYTES NFR BLD AUTO: 12.4 % (ref 5–12)
NEUTROPHILS NFR BLD AUTO: 4.97 10*3/MM3 (ref 1.7–7)
NEUTROPHILS NFR BLD AUTO: 48.9 % (ref 42.7–76)
NITRITE UR QL STRIP: NEGATIVE
NOTE: ABNORMAL
NRBC BLD AUTO-RTO: 0 /100 WBC (ref 0–0.2)
OXYHGB MFR BLDV: 98.1 % (ref 94–99)
PAW @ PEAK INSP FLOW SETTING VENT: 0 CMH2O
PCO2 BLDA: 49.8 MM HG (ref 35–45)
PCO2 TEMP ADJ BLD: 49.8 MM HG (ref 35–48)
PH BLDA: 7.36 PH UNITS (ref 7.35–7.45)
PH UR STRIP.AUTO: <=5 [PH] (ref 5–8)
PH, TEMP CORRECTED: 7.36 PH UNITS
PLATELET # BLD AUTO: 202 10*3/MM3 (ref 140–450)
PMV BLD AUTO: 10.6 FL (ref 6–12)
PO2 BLDA: 289 MM HG (ref 83–108)
PO2 TEMP ADJ BLD: 289 MM HG (ref 83–108)
POTASSIUM SERPL-SCNC: 3.9 MMOL/L (ref 3.5–5.2)
PROT SERPL-MCNC: 5 G/DL (ref 6–8.5)
PROT UR QL STRIP: ABNORMAL
PROTHROMBIN TIME: 15.6 SECONDS (ref 11.4–14.4)
RBC # BLD AUTO: 3.12 10*6/MM3 (ref 4.14–5.8)
RBC # UR: ABNORMAL /HPF
REF LAB TEST METHOD: ABNORMAL
RH BLD: POSITIVE
SODIUM SERPL-SCNC: 139 MMOL/L (ref 136–145)
SP GR UR STRIP: 1.02 (ref 1–1.03)
SPERM URNS QL MICRO: ABNORMAL /HPF
SQUAMOUS #/AREA URNS HPF: ABNORMAL /HPF
T&S EXPIRATION DATE: NORMAL
TOTAL RATE: 0 BREATHS/MINUTE
TROPONIN T SERPL-MCNC: <0.01 NG/ML (ref 0–0.03)
UROBILINOGEN UR QL STRIP: ABNORMAL
WBC # BLD AUTO: 10.15 10*3/MM3 (ref 3.4–10.8)
WBC UR QL AUTO: ABNORMAL /HPF
WHOLE BLOOD HOLD SPECIMEN: NORMAL
WHOLE BLOOD HOLD SPECIMEN: NORMAL

## 2021-05-22 PROCEDURE — 86850 RBC ANTIBODY SCREEN: CPT | Performed by: EMERGENCY MEDICINE

## 2021-05-22 PROCEDURE — 0BH17EZ INSERTION OF ENDOTRACHEAL AIRWAY INTO TRACHEA, VIA NATURAL OR ARTIFICIAL OPENING: ICD-10-PCS | Performed by: INTERNAL MEDICINE

## 2021-05-22 PROCEDURE — 31500 INSERT EMERGENCY AIRWAY: CPT

## 2021-05-22 PROCEDURE — 36430 TRANSFUSION BLD/BLD COMPNT: CPT

## 2021-05-22 PROCEDURE — 36600 WITHDRAWAL OF ARTERIAL BLOOD: CPT

## 2021-05-22 PROCEDURE — 86923 COMPATIBILITY TEST ELECTRIC: CPT

## 2021-05-22 PROCEDURE — 5A1945Z RESPIRATORY VENTILATION, 24-96 CONSECUTIVE HOURS: ICD-10-PCS | Performed by: INTERNAL MEDICINE

## 2021-05-22 PROCEDURE — 86920 COMPATIBILITY TEST SPIN: CPT

## 2021-05-22 PROCEDURE — 83605 ASSAY OF LACTIC ACID: CPT | Performed by: EMERGENCY MEDICINE

## 2021-05-22 PROCEDURE — 94799 UNLISTED PULMONARY SVC/PX: CPT

## 2021-05-22 PROCEDURE — 87040 BLOOD CULTURE FOR BACTERIA: CPT | Performed by: NURSE PRACTITIONER

## 2021-05-22 PROCEDURE — 87635 SARS-COV-2 COVID-19 AMP PRB: CPT | Performed by: EMERGENCY MEDICINE

## 2021-05-22 PROCEDURE — 82375 ASSAY CARBOXYHB QUANT: CPT

## 2021-05-22 PROCEDURE — 25010000002 PROPOFOL 10 MG/ML EMULSION: Performed by: EMERGENCY MEDICINE

## 2021-05-22 PROCEDURE — 74174 CTA ABD&PLVS W/CONTRAST: CPT

## 2021-05-22 PROCEDURE — 82805 BLOOD GASES W/O2 SATURATION: CPT

## 2021-05-22 PROCEDURE — 85025 COMPLETE CBC W/AUTO DIFF WBC: CPT | Performed by: EMERGENCY MEDICINE

## 2021-05-22 PROCEDURE — 71045 X-RAY EXAM CHEST 1 VIEW: CPT

## 2021-05-22 PROCEDURE — 86900 BLOOD TYPING SEROLOGIC ABO: CPT

## 2021-05-22 PROCEDURE — 25010000002 OCTREOTIDE PER 25 MCG: Performed by: EMERGENCY MEDICINE

## 2021-05-22 PROCEDURE — 86900 BLOOD TYPING SEROLOGIC ABO: CPT | Performed by: EMERGENCY MEDICINE

## 2021-05-22 PROCEDURE — 82140 ASSAY OF AMMONIA: CPT | Performed by: EMERGENCY MEDICINE

## 2021-05-22 PROCEDURE — 25010000002 CEFTRIAXONE PER 250 MG: Performed by: EMERGENCY MEDICINE

## 2021-05-22 PROCEDURE — 93005 ELECTROCARDIOGRAM TRACING: CPT | Performed by: EMERGENCY MEDICINE

## 2021-05-22 PROCEDURE — 25010000002 METOCLOPRAMIDE PER 10 MG: Performed by: EMERGENCY MEDICINE

## 2021-05-22 PROCEDURE — 80053 COMPREHEN METABOLIC PANEL: CPT | Performed by: EMERGENCY MEDICINE

## 2021-05-22 PROCEDURE — 81001 URINALYSIS AUTO W/SCOPE: CPT | Performed by: NURSE PRACTITIONER

## 2021-05-22 PROCEDURE — 99291 CRITICAL CARE FIRST HOUR: CPT | Performed by: INTERNAL MEDICINE

## 2021-05-22 PROCEDURE — 80306 DRUG TEST PRSMV INSTRMNT: CPT | Performed by: NURSE PRACTITIONER

## 2021-05-22 PROCEDURE — 82077 ASSAY SPEC XCP UR&BREATH IA: CPT | Performed by: NURSE PRACTITIONER

## 2021-05-22 PROCEDURE — 93005 ELECTROCARDIOGRAM TRACING: CPT

## 2021-05-22 PROCEDURE — 70450 CT HEAD/BRAIN W/O DYE: CPT

## 2021-05-22 PROCEDURE — 85610 PROTHROMBIN TIME: CPT | Performed by: EMERGENCY MEDICINE

## 2021-05-22 PROCEDURE — P9016 RBC LEUKOCYTES REDUCED: HCPCS

## 2021-05-22 PROCEDURE — 25010000002 FENTANYL CITRATE (PF) 2500 MCG/50ML SOLUTION: Performed by: EMERGENCY MEDICINE

## 2021-05-22 PROCEDURE — 94002 VENT MGMT INPAT INIT DAY: CPT

## 2021-05-22 PROCEDURE — 0 IOPAMIDOL PER 1 ML: Performed by: EMERGENCY MEDICINE

## 2021-05-22 PROCEDURE — 99291 CRITICAL CARE FIRST HOUR: CPT

## 2021-05-22 PROCEDURE — 84484 ASSAY OF TROPONIN QUANT: CPT | Performed by: EMERGENCY MEDICINE

## 2021-05-22 PROCEDURE — 83050 HGB METHEMOGLOBIN QUAN: CPT

## 2021-05-22 PROCEDURE — 83690 ASSAY OF LIPASE: CPT | Performed by: EMERGENCY MEDICINE

## 2021-05-22 PROCEDURE — 86901 BLOOD TYPING SEROLOGIC RH(D): CPT | Performed by: EMERGENCY MEDICINE

## 2021-05-22 RX ORDER — METOCLOPRAMIDE HYDROCHLORIDE 5 MG/ML
10 INJECTION INTRAMUSCULAR; INTRAVENOUS ONCE
Status: COMPLETED | OUTPATIENT
Start: 2021-05-22 | End: 2021-05-22

## 2021-05-22 RX ORDER — PANTOPRAZOLE SODIUM 40 MG/10ML
80 INJECTION, POWDER, LYOPHILIZED, FOR SOLUTION INTRAVENOUS ONCE
Status: COMPLETED | OUTPATIENT
Start: 2021-05-22 | End: 2021-05-22

## 2021-05-22 RX ORDER — NICOTINE POLACRILEX 4 MG
15 LOZENGE BUCCAL
Status: DISCONTINUED | OUTPATIENT
Start: 2021-05-22 | End: 2021-05-24

## 2021-05-22 RX ORDER — DEXTROSE MONOHYDRATE 25 G/50ML
25 INJECTION, SOLUTION INTRAVENOUS
Status: DISCONTINUED | OUTPATIENT
Start: 2021-05-22 | End: 2021-05-24

## 2021-05-22 RX ORDER — SODIUM CHLORIDE 0.9 % (FLUSH) 0.9 %
10 SYRINGE (ML) INJECTION EVERY 12 HOURS SCHEDULED
Status: DISCONTINUED | OUTPATIENT
Start: 2021-05-22 | End: 2021-05-24

## 2021-05-22 RX ORDER — PANTOPRAZOLE SODIUM 40 MG/10ML
40 INJECTION, POWDER, LYOPHILIZED, FOR SOLUTION INTRAVENOUS EVERY 12 HOURS SCHEDULED
Status: DISCONTINUED | OUTPATIENT
Start: 2021-05-22 | End: 2021-06-03 | Stop reason: HOSPADM

## 2021-05-22 RX ORDER — NICOTINE 21 MG/24HR
1 PATCH, TRANSDERMAL 24 HOURS TRANSDERMAL
Status: DISCONTINUED | OUTPATIENT
Start: 2021-05-23 | End: 2021-06-03 | Stop reason: HOSPADM

## 2021-05-22 RX ORDER — SODIUM CHLORIDE 0.9 % (FLUSH) 0.9 %
10 SYRINGE (ML) INJECTION AS NEEDED
Status: DISCONTINUED | OUTPATIENT
Start: 2021-05-22 | End: 2021-05-25

## 2021-05-22 RX ORDER — SODIUM CHLORIDE 0.9 % (FLUSH) 0.9 %
10 SYRINGE (ML) INJECTION AS NEEDED
Status: DISCONTINUED | OUTPATIENT
Start: 2021-05-22 | End: 2021-05-24

## 2021-05-22 RX ORDER — OCTREOTIDE ACETATE 50 UG/ML
50 INJECTION, SOLUTION INTRAVENOUS; SUBCUTANEOUS ONCE
Status: COMPLETED | OUTPATIENT
Start: 2021-05-22 | End: 2021-05-22

## 2021-05-22 RX ADMIN — OCTREOTIDE ACETATE 50 MCG: 50 INJECTION, SOLUTION INTRAVENOUS; SUBCUTANEOUS at 22:29

## 2021-05-22 RX ADMIN — IOPAMIDOL 90 ML: 755 INJECTION, SOLUTION INTRAVENOUS at 23:12

## 2021-05-22 RX ADMIN — METOCLOPRAMIDE 10 MG: 5 INJECTION, SOLUTION INTRAMUSCULAR; INTRAVENOUS at 21:59

## 2021-05-22 RX ADMIN — PANTOPRAZOLE SODIUM 80 MG: 40 INJECTION, POWDER, FOR SOLUTION INTRAVENOUS at 21:53

## 2021-05-22 RX ADMIN — SODIUM CHLORIDE 500 ML: 9 INJECTION, SOLUTION INTRAVENOUS at 22:11

## 2021-05-22 RX ADMIN — PROPOFOL 5 MCG/KG/MIN: 10 INJECTION, EMULSION INTRAVENOUS at 21:54

## 2021-05-22 RX ADMIN — SODIUM CHLORIDE, PRESERVATIVE FREE 10 ML: 5 INJECTION INTRAVENOUS at 23:44

## 2021-05-22 RX ADMIN — SODIUM CHLORIDE 2 G: 900 INJECTION INTRAVENOUS at 21:54

## 2021-05-22 RX ADMIN — FENTANYL CITRATE 50 MCG/HR: 0.05 INJECTION, SOLUTION INTRAMUSCULAR; INTRAVENOUS at 22:07

## 2021-05-22 RX ADMIN — OCTREOTIDE ACETATE 25 MCG/HR: 500 INJECTION, SOLUTION INTRAVENOUS; SUBCUTANEOUS at 22:29

## 2021-05-22 RX ADMIN — PANTOPRAZOLE SODIUM 40 MG: 40 INJECTION, POWDER, FOR SOLUTION INTRAVENOUS at 23:43

## 2021-05-23 ENCOUNTER — APPOINTMENT (OUTPATIENT)
Dept: GENERAL RADIOLOGY | Facility: HOSPITAL | Age: 61
End: 2021-05-23

## 2021-05-23 ENCOUNTER — APPOINTMENT (OUTPATIENT)
Dept: INTERVENTIONAL RADIOLOGY/VASCULAR | Facility: HOSPITAL | Age: 61
End: 2021-05-23

## 2021-05-23 ENCOUNTER — DOCUMENTATION (OUTPATIENT)
Dept: GASTROENTEROLOGY | Facility: CLINIC | Age: 61
End: 2021-05-23

## 2021-05-23 PROBLEM — K92.2 ACUTE UPPER GI BLEED: Status: ACTIVE | Noted: 2021-05-23

## 2021-05-23 LAB
ALBUMIN SERPL-MCNC: 3.3 G/DL (ref 3.5–5.2)
ALBUMIN/GLOB SERPL: 2.2 G/DL
ALP SERPL-CCNC: 44 U/L (ref 39–117)
ALT SERPL W P-5'-P-CCNC: 7 U/L (ref 1–41)
AMMONIA BLD-SCNC: 33 UMOL/L (ref 16–60)
AMPHET+METHAMPHET UR QL: POSITIVE
AMPHETAMINES UR QL: POSITIVE
ANION GAP SERPL CALCULATED.3IONS-SCNC: 8 MMOL/L (ref 5–15)
APTT PPP: 30.9 SECONDS (ref 22–39)
ARTERIAL PATENCY WRIST A: ABNORMAL
ARTERIAL PATENCY WRIST A: ABNORMAL
AST SERPL-CCNC: 14 U/L (ref 1–40)
ATMOSPHERIC PRESS: ABNORMAL MM[HG]
ATMOSPHERIC PRESS: ABNORMAL MM[HG]
BARBITURATES UR QL SCN: NEGATIVE
BASE EXCESS BLDA CALC-SCNC: -2.6 MMOL/L (ref 0–2)
BASE EXCESS BLDA CALC-SCNC: 3.6 MMOL/L (ref 0–2)
BDY SITE: ABNORMAL
BDY SITE: ABNORMAL
BENZODIAZ UR QL SCN: NEGATIVE
BH BB BLOOD EXPIRATION DATE: NORMAL
BH BB BLOOD TYPE BARCODE: 5100
BH BB DISPENSE STATUS: NORMAL
BH BB PRODUCT CODE: NORMAL
BH BB UNIT NUMBER: NORMAL
BILIRUB SERPL-MCNC: 0.3 MG/DL (ref 0–1.2)
BODY TEMPERATURE: 37 C
BODY TEMPERATURE: 37 C
BUN SERPL-MCNC: 52 MG/DL (ref 8–23)
BUN/CREAT SERPL: 41.9 (ref 7–25)
BUPRENORPHINE SERPL-MCNC: NEGATIVE NG/ML
CA-I SERPL ISE-MCNC: 1.08 MMOL/L (ref 1.12–1.32)
CALCIUM SPEC-SCNC: 7.3 MG/DL (ref 8.6–10.5)
CANNABINOIDS SERPL QL: POSITIVE
CHLORIDE SERPL-SCNC: 106 MMOL/L (ref 98–107)
CO2 BLDA-SCNC: 23.6 MMOL/L (ref 22–33)
CO2 BLDA-SCNC: 28.9 MMOL/L (ref 22–33)
CO2 SERPL-SCNC: 26 MMOL/L (ref 22–29)
COCAINE UR QL: NEGATIVE
COHGB MFR BLD: 0.6 % (ref 0–2)
COHGB MFR BLD: 1 % (ref 0–2)
CREAT SERPL-MCNC: 1.24 MG/DL (ref 0.76–1.27)
CROSSMATCH INTERPRETATION: NORMAL
D-LACTATE SERPL-SCNC: 1.1 MMOL/L (ref 0.5–2)
D-LACTATE SERPL-SCNC: 1.7 MMOL/L (ref 0.5–2)
DEPRECATED RDW RBC AUTO: 49.1 FL (ref 37–54)
DEPRECATED RDW RBC AUTO: 49.6 FL (ref 37–54)
EPAP: 0
EPAP: 0
ERYTHROCYTE [DISTWIDTH] IN BLOOD BY AUTOMATED COUNT: 14.2 % (ref 12.3–15.4)
ERYTHROCYTE [DISTWIDTH] IN BLOOD BY AUTOMATED COUNT: 14.7 % (ref 12.3–15.4)
FIBRINOGEN PPP-MCNC: 228 MG/DL (ref 220–470)
GFR SERPL CREATININE-BSD FRML MDRD: 59 ML/MIN/1.73
GLOBULIN UR ELPH-MCNC: 1.5 GM/DL
GLUCOSE BLDC GLUCOMTR-MCNC: 110 MG/DL (ref 70–130)
GLUCOSE BLDC GLUCOMTR-MCNC: 151 MG/DL (ref 70–130)
GLUCOSE BLDC GLUCOMTR-MCNC: 48 MG/DL (ref 70–130)
GLUCOSE BLDC GLUCOMTR-MCNC: 73 MG/DL (ref 70–130)
GLUCOSE BLDC GLUCOMTR-MCNC: 77 MG/DL (ref 70–130)
GLUCOSE BLDC GLUCOMTR-MCNC: 98 MG/DL (ref 70–130)
GLUCOSE SERPL-MCNC: 145 MG/DL (ref 65–99)
HAV IGM SERPL QL IA: ABNORMAL
HBV CORE IGM SERPL QL IA: ABNORMAL
HBV SURFACE AG SERPL QL IA: ABNORMAL
HCO3 BLDA-SCNC: 22.4 MMOL/L (ref 20–26)
HCO3 BLDA-SCNC: 27.7 MMOL/L (ref 20–26)
HCT VFR BLD AUTO: 22.6 % (ref 37.5–51)
HCT VFR BLD AUTO: 28.5 % (ref 37.5–51)
HCT VFR BLD AUTO: 28.7 % (ref 37.5–51)
HCT VFR BLD AUTO: 34.6 % (ref 37.5–51)
HCT VFR BLD AUTO: 38.3 % (ref 37.5–51)
HCT VFR BLD CALC: 23.3 %
HCT VFR BLD CALC: 28.2 %
HCV AB SER DONR QL: REACTIVE
HGB BLD-MCNC: 10.7 G/DL (ref 13–17.7)
HGB BLD-MCNC: 11 G/DL (ref 13–17.7)
HGB BLD-MCNC: 7.6 G/DL (ref 13–17.7)
HGB BLD-MCNC: 9.1 G/DL (ref 13–17.7)
HGB BLD-MCNC: 9.5 G/DL (ref 13–17.7)
HGB BLDA-MCNC: 7.6 G/DL (ref 13.5–17.5)
HGB BLDA-MCNC: 9.2 G/DL (ref 13.5–17.5)
HOLD SPECIMEN: NORMAL
INHALED O2 CONCENTRATION: 30 %
INHALED O2 CONCENTRATION: 40 %
INR PPP: 1.37 (ref 0.85–1.16)
IPAP: 0
IPAP: 0
MAGNESIUM SERPL-MCNC: 2.1 MG/DL (ref 1.6–2.4)
MCH RBC QN AUTO: 29.5 PG (ref 26.6–33)
MCH RBC QN AUTO: 31.9 PG (ref 26.6–33)
MCHC RBC AUTO-ENTMCNC: 31.9 G/DL (ref 31.5–35.7)
MCHC RBC AUTO-ENTMCNC: 33.6 G/DL (ref 31.5–35.7)
MCV RBC AUTO: 92.5 FL (ref 79–97)
MCV RBC AUTO: 95 FL (ref 79–97)
METHADONE UR QL SCN: NEGATIVE
METHGB BLD QL: 0.4 % (ref 0–1.5)
METHGB BLD QL: 0.6 % (ref 0–1.5)
MODALITY: ABNORMAL
MODALITY: ABNORMAL
NOTE: ABNORMAL
NOTE: ABNORMAL
OPIATES UR QL: POSITIVE
OXYCODONE UR QL SCN: NEGATIVE
OXYHGB MFR BLDV: 97.7 % (ref 94–99)
OXYHGB MFR BLDV: 98.4 % (ref 94–99)
PAW @ PEAK INSP FLOW SETTING VENT: 0 CMH2O
PAW @ PEAK INSP FLOW SETTING VENT: 0 CMH2O
PCO2 BLDA: 38.7 MM HG (ref 35–45)
PCO2 BLDA: 38.7 MM HG (ref 35–45)
PCO2 TEMP ADJ BLD: 38.7 MM HG (ref 35–48)
PCO2 TEMP ADJ BLD: 38.7 MM HG (ref 35–48)
PCP UR QL SCN: NEGATIVE
PH BLDA: 7.37 PH UNITS (ref 7.35–7.45)
PH BLDA: 7.46 PH UNITS (ref 7.35–7.45)
PH, TEMP CORRECTED: 7.37 PH UNITS
PH, TEMP CORRECTED: 7.46 PH UNITS
PHOSPHATE SERPL-MCNC: 3.4 MG/DL (ref 2.5–4.5)
PLATELET # BLD AUTO: 154 10*3/MM3 (ref 140–450)
PLATELET # BLD AUTO: 99 10*3/MM3 (ref 140–450)
PMV BLD AUTO: 10.8 FL (ref 6–12)
PMV BLD AUTO: 11.3 FL (ref 6–12)
PO2 BLDA: 119 MM HG (ref 83–108)
PO2 BLDA: 159 MM HG (ref 83–108)
PO2 TEMP ADJ BLD: 119 MM HG (ref 83–108)
PO2 TEMP ADJ BLD: 159 MM HG (ref 83–108)
POTASSIUM SERPL-SCNC: 4.5 MMOL/L (ref 3.5–5.2)
PROPOXYPH UR QL: NEGATIVE
PROT SERPL-MCNC: 4.8 G/DL (ref 6–8.5)
PROTHROMBIN TIME: 16.4 SECONDS (ref 11.4–14.4)
QT INTERVAL: 314 MS
QTC INTERVAL: 451 MS
RBC # BLD AUTO: 2.38 10*6/MM3 (ref 4.14–5.8)
RBC # BLD AUTO: 3.08 10*6/MM3 (ref 4.14–5.8)
SARS-COV-2 RDRP RESP QL NAA+PROBE: NORMAL
SODIUM SERPL-SCNC: 140 MMOL/L (ref 136–145)
TOTAL RATE: 0 BREATHS/MINUTE
TOTAL RATE: 0 BREATHS/MINUTE
TRICYCLICS UR QL SCN: NEGATIVE
UNIT  ABO: NORMAL
UNIT  RH: NORMAL
WBC # BLD AUTO: 12.01 10*3/MM3 (ref 3.4–10.8)
WBC # BLD AUTO: 9.9 10*3/MM3 (ref 3.4–10.8)

## 2021-05-23 PROCEDURE — C1760 CLOSURE DEV, VASC: HCPCS | Performed by: INTERNAL MEDICINE

## 2021-05-23 PROCEDURE — C1889 IMPLANT/INSERT DEVICE, NOC: HCPCS

## 2021-05-23 PROCEDURE — 43255 EGD CONTROL BLEEDING ANY: CPT | Performed by: INTERNAL MEDICINE

## 2021-05-23 PROCEDURE — 71045 X-RAY EXAM CHEST 1 VIEW: CPT

## 2021-05-23 PROCEDURE — C1769 GUIDE WIRE: HCPCS | Performed by: INTERNAL MEDICINE

## 2021-05-23 PROCEDURE — 85610 PROTHROMBIN TIME: CPT | Performed by: INTERNAL MEDICINE

## 2021-05-23 PROCEDURE — 37244 VASC EMBOLIZE/OCCLUDE BLEED: CPT | Performed by: RADIOLOGY

## 2021-05-23 PROCEDURE — 85014 HEMATOCRIT: CPT | Performed by: NURSE PRACTITIONER

## 2021-05-23 PROCEDURE — 86900 BLOOD TYPING SEROLOGIC ABO: CPT

## 2021-05-23 PROCEDURE — C1889 IMPLANT/INSERT DEVICE, NOC: HCPCS | Performed by: INTERNAL MEDICINE

## 2021-05-23 PROCEDURE — P9016 RBC LEUKOCYTES REDUCED: HCPCS

## 2021-05-23 PROCEDURE — 88342 IMHCHEM/IMCYTCHM 1ST ANTB: CPT | Performed by: INTERNAL MEDICINE

## 2021-05-23 PROCEDURE — 85384 FIBRINOGEN ACTIVITY: CPT | Performed by: INTERNAL MEDICINE

## 2021-05-23 PROCEDURE — 94799 UNLISTED PULMONARY SVC/PX: CPT

## 2021-05-23 PROCEDURE — 85027 COMPLETE CBC AUTOMATED: CPT | Performed by: INTERNAL MEDICINE

## 2021-05-23 PROCEDURE — P9047 ALBUMIN (HUMAN), 25%, 50ML: HCPCS | Performed by: NURSE PRACTITIONER

## 2021-05-23 PROCEDURE — 82140 ASSAY OF AMMONIA: CPT | Performed by: INTERNAL MEDICINE

## 2021-05-23 PROCEDURE — 25010000002 THIAMINE PER 100 MG: Performed by: INTERNAL MEDICINE

## 2021-05-23 PROCEDURE — 04L33DZ OCCLUSION OF HEPATIC ARTERY WITH INTRALUMINAL DEVICE, PERCUTANEOUS APPROACH: ICD-10-PCS | Performed by: RADIOLOGY

## 2021-05-23 PROCEDURE — 85730 THROMBOPLASTIN TIME PARTIAL: CPT | Performed by: INTERNAL MEDICINE

## 2021-05-23 PROCEDURE — 25010000002 IOPAMIDOL 61 % SOLUTION

## 2021-05-23 PROCEDURE — 99223 1ST HOSP IP/OBS HIGH 75: CPT | Performed by: NURSE PRACTITIONER

## 2021-05-23 PROCEDURE — 75774 ARTERY X-RAY EACH VESSEL: CPT

## 2021-05-23 PROCEDURE — 36600 WITHDRAWAL OF ARTERIAL BLOOD: CPT

## 2021-05-23 PROCEDURE — 82330 ASSAY OF CALCIUM: CPT | Performed by: INTERNAL MEDICINE

## 2021-05-23 PROCEDURE — P9035 PLATELET PHERES LEUKOREDUCED: HCPCS

## 2021-05-23 PROCEDURE — P9017 PLASMA 1 DONOR FRZ W/IN 8 HR: HCPCS

## 2021-05-23 PROCEDURE — 25010000002 METOCLOPRAMIDE PER 10 MG: Performed by: NURSE PRACTITIONER

## 2021-05-23 PROCEDURE — 04L53DZ OCCLUSION OF SUPERIOR MESENTERIC ARTERY WITH INTRALUMINAL DEVICE, PERCUTANEOUS APPROACH: ICD-10-PCS | Performed by: RADIOLOGY

## 2021-05-23 PROCEDURE — 82805 BLOOD GASES W/O2 SATURATION: CPT

## 2021-05-23 PROCEDURE — 43239 EGD BIOPSY SINGLE/MULTIPLE: CPT | Performed by: INTERNAL MEDICINE

## 2021-05-23 PROCEDURE — 84100 ASSAY OF PHOSPHORUS: CPT | Performed by: INTERNAL MEDICINE

## 2021-05-23 PROCEDURE — 75726 ARTERY X-RAYS ABDOMEN: CPT | Performed by: RADIOLOGY

## 2021-05-23 PROCEDURE — C1887 CATHETER, GUIDING: HCPCS | Performed by: INTERNAL MEDICINE

## 2021-05-23 PROCEDURE — 83735 ASSAY OF MAGNESIUM: CPT | Performed by: INTERNAL MEDICINE

## 2021-05-23 PROCEDURE — 80053 COMPREHEN METABOLIC PANEL: CPT | Performed by: INTERNAL MEDICINE

## 2021-05-23 PROCEDURE — 83050 HGB METHEMOGLOBIN QUAN: CPT

## 2021-05-23 PROCEDURE — 25010000002 ALBUMIN HUMAN 25% PER 50 ML: Performed by: NURSE PRACTITIONER

## 2021-05-23 PROCEDURE — 94003 VENT MGMT INPAT SUBQ DAY: CPT

## 2021-05-23 PROCEDURE — 0W3P8ZZ CONTROL BLEEDING IN GASTROINTESTINAL TRACT, VIA NATURAL OR ARTIFICIAL OPENING ENDOSCOPIC: ICD-10-PCS | Performed by: INTERNAL MEDICINE

## 2021-05-23 PROCEDURE — 82375 ASSAY CARBOXYHB QUANT: CPT

## 2021-05-23 PROCEDURE — 85018 HEMOGLOBIN: CPT | Performed by: NURSE PRACTITIONER

## 2021-05-23 PROCEDURE — 99291 CRITICAL CARE FIRST HOUR: CPT | Performed by: INTERNAL MEDICINE

## 2021-05-23 PROCEDURE — 3E0G8GC INTRODUCTION OF OTHER THERAPEUTIC SUBSTANCE INTO UPPER GI, VIA NATURAL OR ARTIFICIAL OPENING ENDOSCOPIC: ICD-10-PCS | Performed by: INTERNAL MEDICINE

## 2021-05-23 PROCEDURE — 86927 PLASMA FRESH FROZEN: CPT

## 2021-05-23 PROCEDURE — 75726 ARTERY X-RAYS ABDOMEN: CPT

## 2021-05-23 PROCEDURE — 83605 ASSAY OF LACTIC ACID: CPT | Performed by: EMERGENCY MEDICINE

## 2021-05-23 PROCEDURE — 80074 ACUTE HEPATITIS PANEL: CPT | Performed by: NURSE PRACTITIONER

## 2021-05-23 PROCEDURE — 87536 HIV-1 QUANT&REVRSE TRNSCRPJ: CPT | Performed by: NURSE PRACTITIONER

## 2021-05-23 PROCEDURE — 83605 ASSAY OF LACTIC ACID: CPT | Performed by: INTERNAL MEDICINE

## 2021-05-23 PROCEDURE — 36430 TRANSFUSION BLD/BLD COMPNT: CPT

## 2021-05-23 PROCEDURE — 82962 GLUCOSE BLOOD TEST: CPT

## 2021-05-23 PROCEDURE — 25010000002 FENTANYL CITRATE (PF) 2500 MCG/50ML SOLUTION: Performed by: EMERGENCY MEDICINE

## 2021-05-23 PROCEDURE — 88305 TISSUE EXAM BY PATHOLOGIST: CPT | Performed by: INTERNAL MEDICINE

## 2021-05-23 PROCEDURE — 0DB68ZX EXCISION OF STOMACH, VIA NATURAL OR ARTIFICIAL OPENING ENDOSCOPIC, DIAGNOSTIC: ICD-10-PCS | Performed by: INTERNAL MEDICINE

## 2021-05-23 PROCEDURE — 25010000002 PROPOFOL 10 MG/ML EMULSION: Performed by: EMERGENCY MEDICINE

## 2021-05-23 PROCEDURE — 63710000001 INSULIN REGULAR HUMAN PER 5 UNITS

## 2021-05-23 DEVICE — ST SYS OTSC CLIP 11/3T 165CM
Type: IMPLANTABLE DEVICE | Site: PYLORUS | Status: NON-FUNCTIONAL
Removed: 2021-05-25

## 2021-05-23 RX ORDER — CHLORHEXIDINE GLUCONATE 0.12 MG/ML
15 RINSE ORAL EVERY 12 HOURS SCHEDULED
Status: DISCONTINUED | OUTPATIENT
Start: 2021-05-23 | End: 2021-05-26

## 2021-05-23 RX ORDER — NOREPINEPHRINE BIT/0.9 % NACL 8 MG/250ML
.02-.3 INFUSION BOTTLE (ML) INTRAVENOUS
Status: DISCONTINUED | OUTPATIENT
Start: 2021-05-23 | End: 2021-05-26

## 2021-05-23 RX ORDER — METOCLOPRAMIDE HYDROCHLORIDE 5 MG/ML
10 INJECTION INTRAMUSCULAR; INTRAVENOUS ONCE
Status: COMPLETED | OUTPATIENT
Start: 2021-05-23 | End: 2021-05-23

## 2021-05-23 RX ORDER — MIDAZOLAM IN NACL,ISO-OSMOT/PF 50 MG/50ML
INFUSION BOTTLE (ML) INTRAVENOUS
Status: DISPENSED
Start: 2021-05-23 | End: 2021-05-23

## 2021-05-23 RX ORDER — MIDAZOLAM HYDROCHLORIDE 1 MG/ML
INJECTION INTRAMUSCULAR; INTRAVENOUS
Status: DISPENSED
Start: 2021-05-23 | End: 2021-05-23

## 2021-05-23 RX ORDER — NITROGLYCERIN 20 MG/100ML
INJECTION INTRAVENOUS
Status: COMPLETED
Start: 2021-05-23 | End: 2021-05-23

## 2021-05-23 RX ORDER — FENTANYL CITRATE 50 UG/ML
INJECTION, SOLUTION INTRAMUSCULAR; INTRAVENOUS
Status: DISPENSED
Start: 2021-05-23 | End: 2021-05-23

## 2021-05-23 RX ORDER — LIDOCAINE HYDROCHLORIDE 10 MG/ML
INJECTION, SOLUTION EPIDURAL; INFILTRATION; INTRACAUDAL; PERINEURAL
Status: DISPENSED
Start: 2021-05-23 | End: 2021-05-23

## 2021-05-23 RX ORDER — MIDAZOLAM IN NACL,ISO-OSMOT/PF 50 MG/50ML
1-10 INFUSION BOTTLE (ML) INTRAVENOUS
Status: DISCONTINUED | OUTPATIENT
Start: 2021-05-23 | End: 2021-05-24

## 2021-05-23 RX ORDER — LIDOCAINE HYDROCHLORIDE 10 MG/ML
INJECTION, SOLUTION EPIDURAL; INFILTRATION; INTRACAUDAL; PERINEURAL
Status: COMPLETED
Start: 2021-05-23 | End: 2021-05-23

## 2021-05-23 RX ORDER — MIDAZOLAM HYDROCHLORIDE 1 MG/ML
INJECTION INTRAMUSCULAR; INTRAVENOUS
Status: DISCONTINUED
Start: 2021-05-23 | End: 2021-05-23 | Stop reason: WASHOUT

## 2021-05-23 RX ORDER — ALBUMIN (HUMAN) 12.5 G/50ML
50 SOLUTION INTRAVENOUS ONCE
Status: COMPLETED | OUTPATIENT
Start: 2021-05-23 | End: 2021-05-23

## 2021-05-23 RX ADMIN — ALBUMIN HUMAN 50 G: 0.25 SOLUTION INTRAVENOUS at 00:22

## 2021-05-23 RX ADMIN — Medication 1 PATCH: at 08:21

## 2021-05-23 RX ADMIN — INSULIN HUMAN 2 UNITS: 100 INJECTION, SOLUTION PARENTERAL at 06:07

## 2021-05-23 RX ADMIN — CHLORHEXIDINE GLUCONATE 15 ML: 1.2 SOLUTION ORAL at 20:24

## 2021-05-23 RX ADMIN — FENTANYL CITRATE 200 MCG/HR: 0.05 INJECTION, SOLUTION INTRAMUSCULAR; INTRAVENOUS at 07:37

## 2021-05-23 RX ADMIN — Medication 0.02 MCG/KG/MIN: at 01:37

## 2021-05-23 RX ADMIN — IOPAMIDOL 115 ML: 612 INJECTION, SOLUTION INTRAVENOUS at 15:46

## 2021-05-23 RX ADMIN — SODIUM CHLORIDE, PRESERVATIVE FREE 10 ML: 5 INJECTION INTRAVENOUS at 20:24

## 2021-05-23 RX ADMIN — PROPOFOL 40 MCG/KG/MIN: 10 INJECTION, EMULSION INTRAVENOUS at 08:34

## 2021-05-23 RX ADMIN — PANTOPRAZOLE SODIUM 40 MG: 40 INJECTION, POWDER, FOR SOLUTION INTRAVENOUS at 20:24

## 2021-05-23 RX ADMIN — PROPOFOL 45 MCG/KG/MIN: 10 INJECTION, EMULSION INTRAVENOUS at 03:39

## 2021-05-23 RX ADMIN — FENTANYL CITRATE 300 MCG/HR: 0.05 INJECTION, SOLUTION INTRAMUSCULAR; INTRAVENOUS at 17:28

## 2021-05-23 RX ADMIN — NITROGLYCERIN 200 MCG: 20 INJECTION INTRAVENOUS at 13:05

## 2021-05-23 RX ADMIN — SODIUM CHLORIDE, PRESERVATIVE FREE 10 ML: 5 INJECTION INTRAVENOUS at 08:37

## 2021-05-23 RX ADMIN — FOLIC ACID 1 MG: 5 INJECTION, SOLUTION INTRAMUSCULAR; INTRAVENOUS; SUBCUTANEOUS at 08:21

## 2021-05-23 RX ADMIN — SODIUM CHLORIDE 1000 ML: 9 INJECTION, SOLUTION INTRAVENOUS at 00:26

## 2021-05-23 RX ADMIN — IOPAMIDOL 100 ML: 612 INJECTION, SOLUTION INTRAVENOUS at 15:45

## 2021-05-23 RX ADMIN — METOCLOPRAMIDE 10 MG: 5 INJECTION, SOLUTION INTRAMUSCULAR; INTRAVENOUS at 16:23

## 2021-05-23 RX ADMIN — DEXTROSE MONOHYDRATE 25 G: 25 INJECTION, SOLUTION INTRAVENOUS at 20:19

## 2021-05-23 RX ADMIN — Medication 8 MG/HR: at 16:22

## 2021-05-23 RX ADMIN — THIAMINE HYDROCHLORIDE 100 MG: 100 INJECTION, SOLUTION INTRAMUSCULAR; INTRAVENOUS at 08:21

## 2021-05-23 RX ADMIN — Medication 1 MG/HR: at 09:36

## 2021-05-23 RX ADMIN — ASCORBIC ACID, VITAMIN A PALMITATE, CHOLECALCIFEROL, THIAMINE HYDROCHLORIDE, RIBOFLAVIN-5 PHOSPHATE SODIUM, PYRIDOXINE HYDROCHLORIDE, NIACINAMIDE, DEXPANTHENOL, ALPHA-TOCOPHEROL ACETATE, VITAMIN K1, FOLIC ACID, BIOTIN, CYANOCOBALAMIN: 200; 3300; 200; 6; 3.6; 6; 40; 15; 10; 150; 600; 60; 5 INJECTION, SOLUTION INTRAVENOUS at 08:21

## 2021-05-23 RX ADMIN — PANTOPRAZOLE SODIUM 40 MG: 40 INJECTION, POWDER, FOR SOLUTION INTRAVENOUS at 08:21

## 2021-05-23 RX ADMIN — CHLORHEXIDINE GLUCONATE 15 ML: 1.2 SOLUTION ORAL at 08:21

## 2021-05-23 RX ADMIN — LIDOCAINE HYDROCHLORIDE 3 ML: 10 INJECTION, SOLUTION EPIDURAL; INFILTRATION; INTRACAUDAL; PERINEURAL at 15:26

## 2021-05-23 NOTE — PROGRESS NOTES
GASTROENTEROLOGY CONSULTATION   Jaime Connell  [id number]  1960    Gastroenterology consultation referring physician Dr. Serge Chau    Chief Complaint   Patient presents with   • Upper gastrointestinal bleed        HISTORY OF PRESENT ILLNESS:  Patient known to the GI service.  60-year-old white male who 2 years ago presented with massive upper GI bleed secondary to peripyloric gastric ulceration which was treated with endoscopic hemostatic clip.  He presented tonight via EMS to the [facility] emergency department after hematemesis.  He had changes in mental status and was intubated upon arrival in emergency department.  He is unable to contribute to his history as he is currently sedated with a propofol drip.  History is obtained from review of the record.        PAST MEDICAL HISTORY  Past Medical History:   Diagnosis Date   • Acute blood loss anemia 7/20/2019   • Back pain    • Hepatitis C antibody test positive 7/20/2019   • Large penetrating prepyloric gastric ulcer. Clip placed 7/20/2019 (CMS/HCC) 7/20/2019   • Smoker 7/20/2019        PAST SURGICAL HISTORY  Past Surgical History:   Procedure Laterality Date   • BACK SURGERY     • ENDOSCOPY N/A 7/20/2019    Procedure: ESOPHAGOGASTRODUODENOSCOPY;  Surgeon: Osmani Anderson MD;  Location: [facility] ENDOSCOPY;  Service: Gastroenterology        MEDICATIONS:  No current facility-administered medications for this visit.  No current outpatient medications on file.    Facility-Administered Medications Ordered in Other Visits:   •  albumin human 25 % IV SOLN 50 g, 50 g, Intravenous, Once, Chana Byers, APRN  •  dextrose (D50W) 25 g/ 50mL Intravenous Solution 25 g, 25 g, Intravenous, Q15 Min PRN, Chana Byers, APRN  •  dextrose (GLUTOSE) oral gel 15 g, 15 g, Oral, Q15 Min PRN, Chana Byers, APRN  •  fentaNYL 2500 mcg/250 mL NS infusion,  mcg/hr, Intravenous, Titrated, Joseph Sheets MD, Last Rate: 25 mL/hr at 05/23/21 0011, 250 mcg/hr at  05/23/21 0011  •  folic acid 1 mg in sodium chloride 0.9 % 50 mL IVPB, 1 mg, Intravenous, Daily, Anam Chau MD  •  glucagon (human recombinant) (GLUCAGEN DIAGNOSTIC) injection 1 mg, 1 mg, Subcutaneous, Q15 Min PRN, Chana Byers APRN  •  insulin lispro (humaLOG) injection 0-7 Units, 0-7 Units, Subcutaneous, TID AC, Chana Byers APRN  •  multiple vitamin 10 mL in sodium chloride 0.9 % 1,000 mL IVPB, , Intravenous, Daily, Anam Chau MD  •  nicotine (NICODERM CQ) 21 MG/24HR patch 1 patch, 1 patch, Transdermal, Q24H, Anam Chau MD  •  octreotide (sandoSTATIN) 500 mcg in sodium chloride 0.9 % 100 mL (5 mcg/mL) infusion, 25 mcg/hr, Intravenous, Continuous, Joseph Sheets MD, Last Rate: 5 mL/hr at 05/22/21 2334, 25 mcg/hr at 05/22/21 2334  •  pantoprazole (PROTONIX) injection 40 mg, 40 mg, Intravenous, Q12H, Chana Byers APRN, 40 mg at 05/22/21 2343  •  propofol (DIPRIVAN) infusion 10 mg/mL 100 mL, 5-50 mcg/kg/min, Intravenous, Titrated, Joseph Sheets MD, Last Rate: 12.73 mL/hr at 05/23/21 0011, 30 mcg/kg/min at 05/23/21 0011  •  sodium chloride 0.9 % flush 10 mL, 10 mL, Intravenous, PRN, Joseph Sheets MD  •  sodium chloride 0.9 % flush 10 mL, 10 mL, Intravenous, Q12H, Chana Byers APRN, 10 mL at 05/22/21 2344  •  sodium chloride 0.9 % flush 10 mL, 10 mL, Intravenous, PRN, Chana Byers APRN  •  thiamine (B-1) 100 mg in sodium chloride 0.9 % 100 mL IVPB, 100 mg, Intravenous, Daily, Anam Chau MD    ALLERGIES  Allergies   Allergen Reactions   • Penicillins Unknown (See Comments)     Pt unable to verify.       FAMILY HISTORY:  No family history on file.    SOCIAL HISTORY  Social History     Socioeconomic History   • Marital status: Single     Spouse name: Not on file   • Number of children: Not on file   • Years of education: Not on file   • Highest education level: Not on file   Tobacco Use   • Smoking status: Current Every Day Smoker     Packs/day:  1.50     Types: Cigarettes   Substance and Sexual Activity   • Alcohol use: No   • Drug use: No     Socioeconomic History:  He has a fiancée.  Apparently he smokes a pack of cigarettes at least per day.  History of hepatitis C which has been treated.       REVIEW OF SYSTEMS  Review of Systems  Unobtainable    PHYSICAL EXAM   There were no vitals taken for this visit.  General: Sedated male in no acute distress.    HEENT: Nasogastric tube with dark blood noted  Neck: Supple without lymphadenopathy  CV: S1-S2 without gallops rubs or clicks  Lungs: Clear to auscultation anteriorly without rales rhonchi or wheezing  Abdomen: Soft without rebound tenderness or guarding with normal bowel sounds.  Extremeties: Without clubbing cyanosis or edema  Neurologic: Unable to perform      Results for orders placed or performed during the hospital encounter of 05/22/21   Comprehensive Metabolic Panel    Specimen: Blood   Result Value Ref Range    Glucose 189 (H) 65 - 99 mg/dL    BUN 57 (H) 8 - 23 mg/dL    Creatinine 1.74 (H) 0.76 - 1.27 mg/dL    Sodium 139 136 - 145 mmol/L    Potassium 3.9 3.5 - 5.2 mmol/L    Chloride 99 98 - 107 mmol/L    CO2 25.0 22.0 - 29.0 mmol/L    Calcium 8.2 (L) 8.6 - 10.5 mg/dL    Total Protein 5.0 (L) 6.0 - 8.5 g/dL    Albumin 3.20 (L) 3.50 - 5.20 g/dL    ALT (SGPT) 10 1 - 41 U/L    AST (SGOT) 17 1 - 40 U/L    Alkaline Phosphatase 61 39 - 117 U/L    Total Bilirubin 0.3 0.0 - 1.2 mg/dL    eGFR Non African Amer 40 (L) >60 mL/min/1.73    Globulin 1.8 gm/dL    A/G Ratio 1.8 g/dL    BUN/Creatinine Ratio 32.8 (H) 7.0 - 25.0    Anion Gap 15.0 5.0 - 15.0 mmol/L   Protime-INR    Specimen: Blood   Result Value Ref Range    Protime 15.6 (H) 11.4 - 14.4 Seconds    INR 1.28 (H) 0.85 - 1.16   Lactic Acid, Plasma    Specimen: Blood   Result Value Ref Range    Lactate 5.1 (C) 0.5 - 2.0 mmol/L   Ammonia    Specimen: Blood   Result Value Ref Range    Ammonia 34 16 - 60 umol/L   Lipase    Specimen: Blood   Result Value Ref  Range    Lipase 28 13 - 60 U/L   CBC Auto Differential    Specimen: Blood   Result Value Ref Range    WBC 10.15 3.40 - 10.80 10*3/mm3    RBC 3.12 (L) 4.14 - 5.80 10*6/mm3    Hemoglobin 9.9 (L) 13.0 - 17.7 g/dL    Hematocrit 30.5 (L) 37.5 - 51.0 %    MCV 97.8 (H) 79.0 - 97.0 fL    MCH 31.7 26.6 - 33.0 pg    MCHC 32.5 31.5 - 35.7 g/dL    RDW 13.2 12.3 - 15.4 %    RDW-SD 46.8 37.0 - 54.0 fl    MPV 10.6 6.0 - 12.0 fL    Platelets 202 140 - 450 10*3/mm3    Neutrophil % 48.9 42.7 - 76.0 %    Lymphocyte % 35.4 19.6 - 45.3 %    Monocyte % 12.4 (H) 5.0 - 12.0 %    Eosinophil % 1.3 0.3 - 6.2 %    Basophil % 0.9 0.0 - 1.5 %    Immature Grans % 1.1 (H) 0.0 - 0.5 %    Neutrophils, Absolute 4.97 1.70 - 7.00 10*3/mm3    Lymphocytes, Absolute 3.59 (H) 0.70 - 3.10 10*3/mm3    Monocytes, Absolute 1.26 (H) 0.10 - 0.90 10*3/mm3    Eosinophils, Absolute 0.13 0.00 - 0.40 10*3/mm3    Basophils, Absolute 0.09 0.00 - 0.20 10*3/mm3    Immature Grans, Absolute 0.11 (H) 0.00 - 0.05 10*3/mm3    nRBC 0.0 0.0 - 0.2 /100 WBC   Troponin    Specimen: Blood   Result Value Ref Range    Troponin T <0.010 0.000 - 0.030 ng/mL   Blood Gas, Arterial With Co-Ox    Specimen: Arterial Blood   Result Value Ref Range    Site Left Radial     Franky's Test N/A     pH, Arterial 7.357 7.350 - 7.450 pH units    pCO2, Arterial 49.8 (H) 35.0 - 45.0 mm Hg    pO2, Arterial 289.0 (H) 83.0 - 108.0 mm Hg    HCO3, Arterial 27.9 (H) 20.0 - 26.0 mmol/L    Base Excess, Arterial 1.9 0.0 - 2.0 mmol/L    Hemoglobin, Blood Gas 9.5 (L) 13.5 - 17.5 g/dL    Hematocrit, Blood Gas 29.0 %    Oxyhemoglobin 98.1 94 - 99 %    Methemoglobin 0.40 0.00 - 1.50 %    Carboxyhemoglobin 1.5 0 - 2 %    CO2 Content 29.4 22 - 33 mmol/L    Temperature 37.0 C    Barometric Pressure for Blood Gas      Modality Ventilator     FIO2 60 %    Rate 0 Breaths/minute    PIP 0 cmH2O    IPAP 0     EPAP 0     Note      pH, Temp Corrected 7.357 pH Units    pCO2, Temperature Corrected 49.8 (H) 35 - 48 mm Hg    pO2,  Temperature Corrected 289 (H) 83 - 108 mm Hg   Urinalysis With Culture If Indicated - Urine, Random Void    Specimen: Urine, Random Void   Result Value Ref Range    Color, UA Yellow Yellow, Straw    Appearance, UA Clear Clear    pH, UA <=5.0 5.0 - 8.0    Specific Gravity, UA 1.022 1.001 - 1.030    Glucose, UA Negative Negative    Ketones, UA Negative Negative    Bilirubin, UA Negative Negative    Blood, UA Negative Negative    Protein, UA 30 mg/dL (1+) (A) Negative    Leuk Esterase, UA Negative Negative    Nitrite, UA Negative Negative    Urobilinogen, UA 0.2 E.U./dL 0.2 - 1.0 E.U./dL   Ethanol    Specimen: Blood   Result Value Ref Range    Ethanol <10 0 - 10 mg/dL   Urinalysis, Microscopic Only - Urine, Random Void    Specimen: Urine, Random Void   Result Value Ref Range    RBC, UA 3-6 (A) None Seen, 0-2 /HPF    WBC, UA 3-5 (A) None Seen, 0-2 /HPF    Bacteria, UA None Seen None Seen, Trace /HPF    Squamous Epithelial Cells, UA 0-2 None Seen, 0-2 /HPF    Hyaline Casts, UA 7-12 0 - 6 /LPF    Sperm, UA Small/1+ (A) None Seen /HPF    Methodology Manual Light Microscopy    Type & Screen    Specimen: Blood   Result Value Ref Range    ABO Type O     RH type Positive     Antibody Screen Negative     T&S Expiration Date 5/25/2021 11:59:59 PM    Prepare RBC, 2 Units   Result Value Ref Range    Product Code D6725G23     Unit Number [id number]     UNIT  ABO O     UNIT  RH POS     Crossmatch Interpretation Compatible     Dispense Status XM     Blood Expiration Date 202106242359     Blood Type Barcode 5100     Product Code D0829O44     Unit Number [id number]     UNIT  ABO O     UNIT  RH POS     Crossmatch Interpretation Compatible     Dispense Status XM     Blood Expiration Date 202106292359     Blood Type Barcode 5100    Prepare RBC, 1 Units   Result Value Ref Range    Product Code S6170H77     Unit Number [id number]     UNIT  ABO O     UNIT  RH POS     Crossmatch Interpretation Compatible     Dispense Status XM      Blood Expiration Date 014187965631     Blood Type Barcode 5100    Light Blue Top   Result Value Ref Range    Extra Tube hold for add-on    Green Top (Gel)   Result Value Ref Range    Extra Tube Hold for add-ons.    Lavender Top   Result Value Ref Range    Extra Tube hold for add-on    Gold Top - SST   Result Value Ref Range    Extra Tube Hold for add-ons.             ASSESSMENT  1.-Upper GI bleed.  Acute.  Currently hemodynamically stable after 1 unit of packed red blood cells.  Family/next of kin  not available to provide consent for emergent procedure and given current status nonconsented procedure would not appear to be indicated.  2.-History of hepatitis C without varices on 2019 EGD  3.-Anemia of acute blood loss  4.-Change in mental status  5.-Hemodynamic compromise    PLAN  1.-Supportive care as per critical care with serial hemoglobin hematocrit, transfusion of packed red blood cells as needed, octreotide infusion with further recommendation deferred pending clinical progress and ability to obtain proper informed consent for procedure if necessary.  Discussed with Dr. Isac Martinez MD  5/23/2021   00:20 EDT

## 2021-05-24 ENCOUNTER — APPOINTMENT (OUTPATIENT)
Dept: GENERAL RADIOLOGY | Facility: HOSPITAL | Age: 61
End: 2021-05-24

## 2021-05-24 LAB
ALBUMIN SERPL-MCNC: 2.3 G/DL (ref 3.5–5.2)
ALBUMIN/GLOB SERPL: 1.4 G/DL
ALP SERPL-CCNC: 45 U/L (ref 39–117)
ALT SERPL W P-5'-P-CCNC: 198 U/L (ref 1–41)
ANION GAP SERPL CALCULATED.3IONS-SCNC: 8 MMOL/L (ref 5–15)
ARTERIAL PATENCY WRIST A: ABNORMAL
AST SERPL-CCNC: 280 U/L (ref 1–40)
ATMOSPHERIC PRESS: ABNORMAL MM[HG]
BASE EXCESS BLDA CALC-SCNC: 2.3 MMOL/L (ref 0–2)
BDY SITE: ABNORMAL
BH BB BLOOD EXPIRATION DATE: NORMAL
BH BB BLOOD TYPE BARCODE: 5100
BH BB DISPENSE STATUS: NORMAL
BH BB PRODUCT CODE: NORMAL
BH BB UNIT NUMBER: NORMAL
BILIRUB SERPL-MCNC: 0.5 MG/DL (ref 0–1.2)
BODY TEMPERATURE: 37 C
BUN SERPL-MCNC: 35 MG/DL (ref 8–23)
BUN/CREAT SERPL: 38.5 (ref 7–25)
CA-I SERPL ISE-MCNC: 1.11 MMOL/L (ref 1.12–1.32)
CALCIUM SPEC-SCNC: 7.2 MG/DL (ref 8.6–10.5)
CHLORIDE SERPL-SCNC: 114 MMOL/L (ref 98–107)
CO2 BLDA-SCNC: 28.3 MMOL/L (ref 22–33)
CO2 SERPL-SCNC: 23 MMOL/L (ref 22–29)
COHGB MFR BLD: 1 % (ref 0–2)
CREAT SERPL-MCNC: 0.91 MG/DL (ref 0.76–1.27)
CROSSMATCH INTERPRETATION: NORMAL
D-LACTATE SERPL-SCNC: 1.4 MMOL/L (ref 0.5–2)
DEPRECATED RDW RBC AUTO: 49.4 FL (ref 37–54)
EPAP: 0
ERYTHROCYTE [DISTWIDTH] IN BLOOD BY AUTOMATED COUNT: 15.6 % (ref 12.3–15.4)
GFR SERPL CREATININE-BSD FRML MDRD: 85 ML/MIN/1.73
GLOBULIN UR ELPH-MCNC: 1.6 GM/DL
GLUCOSE BLDC GLUCOMTR-MCNC: 105 MG/DL (ref 70–130)
GLUCOSE BLDC GLUCOMTR-MCNC: 115 MG/DL (ref 70–130)
GLUCOSE BLDC GLUCOMTR-MCNC: 116 MG/DL (ref 70–130)
GLUCOSE BLDC GLUCOMTR-MCNC: 143 MG/DL (ref 70–130)
GLUCOSE SERPL-MCNC: 112 MG/DL (ref 65–99)
HCO3 BLDA-SCNC: 27 MMOL/L (ref 20–26)
HCT VFR BLD AUTO: 22.8 % (ref 37.5–51)
HCT VFR BLD AUTO: 25.5 % (ref 37.5–51)
HCT VFR BLD AUTO: 26.3 % (ref 37.5–51)
HCT VFR BLD AUTO: 26.3 % (ref 37.5–51)
HCT VFR BLD AUTO: 32.9 % (ref 37.5–51)
HCT VFR BLD CALC: 26.1 %
HGB BLD-MCNC: 10 G/DL (ref 13–17.7)
HGB BLD-MCNC: 7.4 G/DL (ref 13–17.7)
HGB BLD-MCNC: 8.3 G/DL (ref 13–17.7)
HGB BLD-MCNC: 9 G/DL (ref 13–17.7)
HGB BLD-MCNC: 9 G/DL (ref 13–17.7)
HGB BLDA-MCNC: 8.5 G/DL (ref 13.5–17.5)
HIV1+2 AB SER QL: NORMAL
INHALED O2 CONCENTRATION: 30 %
INR PPP: 1.41 (ref 0.85–1.16)
IPAP: 0
MAGNESIUM SERPL-MCNC: 2 MG/DL (ref 1.6–2.4)
MCH RBC QN AUTO: 29.9 PG (ref 26.6–33)
MCHC RBC AUTO-ENTMCNC: 34.2 G/DL (ref 31.5–35.7)
MCV RBC AUTO: 87.4 FL (ref 79–97)
METHGB BLD QL: 0.4 % (ref 0–1.5)
MODALITY: ABNORMAL
NOTE: ABNORMAL
OXYHGB MFR BLDV: 94.8 % (ref 94–99)
PAW @ PEAK INSP FLOW SETTING VENT: 0 CMH2O
PCO2 BLDA: 41.9 MM HG (ref 35–45)
PCO2 TEMP ADJ BLD: 41.9 MM HG (ref 35–48)
PH BLDA: 7.42 PH UNITS (ref 7.35–7.45)
PH, TEMP CORRECTED: 7.42 PH UNITS
PHOSPHATE SERPL-MCNC: 2.3 MG/DL (ref 2.5–4.5)
PLATELET # BLD AUTO: 86 10*3/MM3 (ref 140–450)
PMV BLD AUTO: 10.6 FL (ref 6–12)
PO2 BLDA: 73.9 MM HG (ref 83–108)
PO2 TEMP ADJ BLD: 73.9 MM HG (ref 83–108)
POTASSIUM SERPL-SCNC: 4.4 MMOL/L (ref 3.5–5.2)
PROT SERPL-MCNC: 3.9 G/DL (ref 6–8.5)
PROTHROMBIN TIME: 16.7 SECONDS (ref 11.4–14.4)
RBC # BLD AUTO: 3.01 10*6/MM3 (ref 4.14–5.8)
SODIUM SERPL-SCNC: 145 MMOL/L (ref 136–145)
TOTAL RATE: 0 BREATHS/MINUTE
UNIT  ABO: NORMAL
UNIT  RH: NORMAL
WBC # BLD AUTO: 9.89 10*3/MM3 (ref 3.4–10.8)

## 2021-05-24 PROCEDURE — 36600 WITHDRAWAL OF ARTERIAL BLOOD: CPT

## 2021-05-24 PROCEDURE — 85018 HEMOGLOBIN: CPT | Performed by: INTERNAL MEDICINE

## 2021-05-24 PROCEDURE — 86900 BLOOD TYPING SEROLOGIC ABO: CPT

## 2021-05-24 PROCEDURE — 85014 HEMATOCRIT: CPT | Performed by: NURSE PRACTITIONER

## 2021-05-24 PROCEDURE — 82330 ASSAY OF CALCIUM: CPT | Performed by: INTERNAL MEDICINE

## 2021-05-24 PROCEDURE — 99232 SBSQ HOSP IP/OBS MODERATE 35: CPT | Performed by: INTERNAL MEDICINE

## 2021-05-24 PROCEDURE — 25010000003 PHYTONADIONE 10 MG/ML SOLUTION 1 ML AMPULE: Performed by: INTERNAL MEDICINE

## 2021-05-24 PROCEDURE — 83735 ASSAY OF MAGNESIUM: CPT | Performed by: INTERNAL MEDICINE

## 2021-05-24 PROCEDURE — 83605 ASSAY OF LACTIC ACID: CPT | Performed by: INTERNAL MEDICINE

## 2021-05-24 PROCEDURE — 25010000002 THIAMINE PER 100 MG: Performed by: INTERNAL MEDICINE

## 2021-05-24 PROCEDURE — 85027 COMPLETE CBC AUTOMATED: CPT | Performed by: INTERNAL MEDICINE

## 2021-05-24 PROCEDURE — 85014 HEMATOCRIT: CPT | Performed by: INTERNAL MEDICINE

## 2021-05-24 PROCEDURE — 82805 BLOOD GASES W/O2 SATURATION: CPT

## 2021-05-24 PROCEDURE — 83050 HGB METHEMOGLOBIN QUAN: CPT

## 2021-05-24 PROCEDURE — 84100 ASSAY OF PHOSPHORUS: CPT | Performed by: INTERNAL MEDICINE

## 2021-05-24 PROCEDURE — 71045 X-RAY EXAM CHEST 1 VIEW: CPT

## 2021-05-24 PROCEDURE — 85018 HEMOGLOBIN: CPT | Performed by: NURSE PRACTITIONER

## 2021-05-24 PROCEDURE — G0432 EIA HIV-1/HIV-2 SCREEN: HCPCS | Performed by: NURSE PRACTITIONER

## 2021-05-24 PROCEDURE — 85610 PROTHROMBIN TIME: CPT | Performed by: INTERNAL MEDICINE

## 2021-05-24 PROCEDURE — 36430 TRANSFUSION BLD/BLD COMPNT: CPT

## 2021-05-24 PROCEDURE — 25010000002 FENTANYL CITRATE (PF) 2500 MCG/50ML SOLUTION: Performed by: EMERGENCY MEDICINE

## 2021-05-24 PROCEDURE — 82962 GLUCOSE BLOOD TEST: CPT

## 2021-05-24 PROCEDURE — P9016 RBC LEUKOCYTES REDUCED: HCPCS

## 2021-05-24 PROCEDURE — 99291 CRITICAL CARE FIRST HOUR: CPT | Performed by: INTERNAL MEDICINE

## 2021-05-24 PROCEDURE — 94799 UNLISTED PULMONARY SVC/PX: CPT

## 2021-05-24 PROCEDURE — 80053 COMPREHEN METABOLIC PANEL: CPT | Performed by: INTERNAL MEDICINE

## 2021-05-24 PROCEDURE — 82375 ASSAY CARBOXYHB QUANT: CPT

## 2021-05-24 PROCEDURE — 94003 VENT MGMT INPAT SUBQ DAY: CPT

## 2021-05-24 RX ORDER — DEXMEDETOMIDINE HYDROCHLORIDE 4 UG/ML
.2-1.5 INJECTION, SOLUTION INTRAVENOUS
Status: DISCONTINUED | OUTPATIENT
Start: 2021-05-24 | End: 2021-05-28

## 2021-05-24 RX ORDER — DEXTROSE MONOHYDRATE 100 MG/ML
40 INJECTION, SOLUTION INTRAVENOUS CONTINUOUS
Status: DISCONTINUED | OUTPATIENT
Start: 2021-05-24 | End: 2021-05-27

## 2021-05-24 RX ADMIN — DEXMEDETOMIDINE HYDROCHLORIDE 0.2 MCG/KG/HR: 4 INJECTION, SOLUTION INTRAVENOUS at 15:50

## 2021-05-24 RX ADMIN — FENTANYL CITRATE 300 MCG/HR: 0.05 INJECTION, SOLUTION INTRAMUSCULAR; INTRAVENOUS at 21:12

## 2021-05-24 RX ADMIN — PANTOPRAZOLE SODIUM 40 MG: 40 INJECTION, POWDER, FOR SOLUTION INTRAVENOUS at 20:17

## 2021-05-24 RX ADMIN — ASCORBIC ACID, VITAMIN A PALMITATE, CHOLECALCIFEROL, THIAMINE HYDROCHLORIDE, RIBOFLAVIN-5 PHOSPHATE SODIUM, PYRIDOXINE HYDROCHLORIDE, NIACINAMIDE, DEXPANTHENOL, ALPHA-TOCOPHEROL ACETATE, VITAMIN K1, FOLIC ACID, BIOTIN, CYANOCOBALAMIN: 200; 3300; 200; 6; 3.6; 6; 40; 15; 10; 150; 600; 60; 5 INJECTION, SOLUTION INTRAVENOUS at 09:52

## 2021-05-24 RX ADMIN — Medication 1 PATCH: at 09:51

## 2021-05-24 RX ADMIN — PHYTONADIONE 10 MG: 10 INJECTION, EMULSION INTRAMUSCULAR; INTRAVENOUS; SUBCUTANEOUS at 20:16

## 2021-05-24 RX ADMIN — FOLIC ACID 1 MG: 5 INJECTION, SOLUTION INTRAMUSCULAR; INTRAVENOUS; SUBCUTANEOUS at 09:52

## 2021-05-24 RX ADMIN — Medication 0.02 MCG/KG/MIN: at 02:51

## 2021-05-24 RX ADMIN — Medication 0.04 MCG/KG/MIN: at 11:47

## 2021-05-24 RX ADMIN — THIAMINE HYDROCHLORIDE 100 MG: 100 INJECTION, SOLUTION INTRAMUSCULAR; INTRAVENOUS at 09:52

## 2021-05-24 RX ADMIN — CHLORHEXIDINE GLUCONATE 15 ML: 1.2 SOLUTION ORAL at 20:16

## 2021-05-24 RX ADMIN — FENTANYL CITRATE 250 MCG/HR: 0.05 INJECTION, SOLUTION INTRAMUSCULAR; INTRAVENOUS at 01:51

## 2021-05-24 RX ADMIN — THIAMINE HYDROCHLORIDE 500 MG: 100 INJECTION, SOLUTION INTRAMUSCULAR; INTRAVENOUS at 21:05

## 2021-05-24 RX ADMIN — DEXTROSE MONOHYDRATE 20 ML/HR: 100 INJECTION, SOLUTION INTRAVENOUS at 00:30

## 2021-05-24 RX ADMIN — FENTANYL CITRATE 300 MCG/HR: 0.05 INJECTION, SOLUTION INTRAMUSCULAR; INTRAVENOUS at 11:47

## 2021-05-24 RX ADMIN — CHLORHEXIDINE GLUCONATE 15 ML: 1.2 SOLUTION ORAL at 09:53

## 2021-05-24 RX ADMIN — PANTOPRAZOLE SODIUM 40 MG: 40 INJECTION, POWDER, FOR SOLUTION INTRAVENOUS at 09:51

## 2021-05-25 LAB
ANION GAP SERPL CALCULATED.3IONS-SCNC: 5 MMOL/L (ref 5–15)
ARTERIAL PATENCY WRIST A: ABNORMAL
ATMOSPHERIC PRESS: ABNORMAL MM[HG]
BASE EXCESS BLDA CALC-SCNC: 0.9 MMOL/L (ref 0–2)
BASOPHILS # BLD AUTO: 0.03 10*3/MM3 (ref 0–0.2)
BASOPHILS NFR BLD AUTO: 0.3 % (ref 0–1.5)
BDY SITE: ABNORMAL
BH BB BLOOD EXPIRATION DATE: NORMAL
BH BB BLOOD TYPE BARCODE: 5100
BH BB DISPENSE STATUS: NORMAL
BH BB PRODUCT CODE: NORMAL
BH BB UNIT NUMBER: NORMAL
BODY TEMPERATURE: 37 C
BUN SERPL-MCNC: 27 MG/DL (ref 8–23)
BUN/CREAT SERPL: 34.6 (ref 7–25)
CALCIUM SPEC-SCNC: 7.4 MG/DL (ref 8.6–10.5)
CHLORIDE SERPL-SCNC: 115 MMOL/L (ref 98–107)
CLUMPED PLATELETS: PRESENT
CO2 BLDA-SCNC: 26.8 MMOL/L (ref 22–33)
CO2 SERPL-SCNC: 24 MMOL/L (ref 22–29)
COHGB MFR BLD: 0.8 % (ref 0–2)
CREAT SERPL-MCNC: 0.78 MG/DL (ref 0.76–1.27)
CROSSMATCH INTERPRETATION: NORMAL
DEPRECATED RDW RBC AUTO: 55.8 FL (ref 37–54)
EOSINOPHIL # BLD AUTO: 0.12 10*3/MM3 (ref 0–0.4)
EOSINOPHIL NFR BLD AUTO: 1 % (ref 0.3–6.2)
EPAP: 0
ERYTHROCYTE [DISTWIDTH] IN BLOOD BY AUTOMATED COUNT: 16.9 % (ref 12.3–15.4)
GFR SERPL CREATININE-BSD FRML MDRD: 102 ML/MIN/1.73
GLUCOSE BLDC GLUCOMTR-MCNC: 124 MG/DL (ref 70–130)
GLUCOSE BLDC GLUCOMTR-MCNC: 129 MG/DL (ref 70–130)
GLUCOSE BLDC GLUCOMTR-MCNC: 148 MG/DL (ref 70–130)
GLUCOSE BLDC GLUCOMTR-MCNC: 176 MG/DL (ref 70–130)
GLUCOSE SERPL-MCNC: 158 MG/DL (ref 65–99)
HCO3 BLDA-SCNC: 25.6 MMOL/L (ref 20–26)
HCT VFR BLD AUTO: 27.6 % (ref 37.5–51)
HCT VFR BLD AUTO: 28.4 % (ref 37.5–51)
HCT VFR BLD AUTO: 28.7 % (ref 37.5–51)
HCT VFR BLD AUTO: 29.4 % (ref 37.5–51)
HCT VFR BLD CALC: 28.2 %
HGB BLD-MCNC: 8.8 G/DL (ref 13–17.7)
HGB BLD-MCNC: 8.8 G/DL (ref 13–17.7)
HGB BLD-MCNC: 9.2 G/DL (ref 13–17.7)
HGB BLD-MCNC: 9.2 G/DL (ref 13–17.7)
HGB BLDA-MCNC: 9.2 G/DL (ref 13.5–17.5)
HIV1 RNA # SERPL NAA+PROBE: <20 COPIES/ML
HIV1 RNA SERPL NAA+PROBE-LOG#: NORMAL {LOG_COPIES}/ML
IMM GRANULOCYTES # BLD AUTO: 0.1 10*3/MM3 (ref 0–0.05)
IMM GRANULOCYTES NFR BLD AUTO: 0.9 % (ref 0–0.5)
INHALED O2 CONCENTRATION: 30 %
INR PPP: 1.29 (ref 0.85–1.16)
IPAP: 0
LYMPHOCYTES # BLD AUTO: 1.41 10*3/MM3 (ref 0.7–3.1)
LYMPHOCYTES NFR BLD AUTO: 12.1 % (ref 19.6–45.3)
MAGNESIUM SERPL-MCNC: 1.9 MG/DL (ref 1.6–2.4)
MCH RBC QN AUTO: 28.5 PG (ref 26.6–33)
MCHC RBC AUTO-ENTMCNC: 31.1 G/DL (ref 31.5–35.7)
MCV RBC AUTO: 91.7 FL (ref 79–97)
METHGB BLD QL: 0.5 % (ref 0–1.5)
MODALITY: ABNORMAL
MONOCYTES # BLD AUTO: 1.51 10*3/MM3 (ref 0.1–0.9)
MONOCYTES NFR BLD AUTO: 12.9 % (ref 5–12)
NEUTROPHILS NFR BLD AUTO: 72.8 % (ref 42.7–76)
NEUTROPHILS NFR BLD AUTO: 8.53 10*3/MM3 (ref 1.7–7)
NOTE: ABNORMAL
NRBC BLD AUTO-RTO: 0.2 /100 WBC (ref 0–0.2)
OXYHGB MFR BLDV: 97.1 % (ref 94–99)
PAW @ PEAK INSP FLOW SETTING VENT: 0 CMH2O
PCO2 BLDA: 40.3 MM HG (ref 35–45)
PCO2 TEMP ADJ BLD: 40.3 MM HG (ref 35–48)
PEEP RESPIRATORY: 5 CM[H2O]
PH BLDA: 7.41 PH UNITS (ref 7.35–7.45)
PH, TEMP CORRECTED: 7.41 PH UNITS
PHOSPHATE SERPL-MCNC: 1.6 MG/DL (ref 2.5–4.5)
PHOSPHATE SERPL-MCNC: 1.7 MG/DL (ref 2.5–4.5)
PLATELET # BLD AUTO: 88 10*3/MM3 (ref 140–450)
PMV BLD AUTO: 11.1 FL (ref 6–12)
PO2 BLDA: 100 MM HG (ref 83–108)
PO2 TEMP ADJ BLD: 100 MM HG (ref 83–108)
POTASSIUM SERPL-SCNC: 4.1 MMOL/L (ref 3.5–5.2)
PROTHROMBIN TIME: 15.7 SECONDS (ref 11.4–14.4)
RBC # BLD AUTO: 3.12 10*6/MM3 (ref 4.14–5.8)
RBC MORPH BLD: NORMAL
SODIUM SERPL-SCNC: 144 MMOL/L (ref 136–145)
TOTAL RATE: 0 BREATHS/MINUTE
UNIT  ABO: NORMAL
UNIT  RH: NORMAL
VENTILATOR MODE: ABNORMAL
WBC # BLD AUTO: 11.7 10*3/MM3 (ref 3.4–10.8)
WBC MORPH BLD: NORMAL

## 2021-05-25 PROCEDURE — 25010000002 THIAMINE PER 100 MG: Performed by: INTERNAL MEDICINE

## 2021-05-25 PROCEDURE — 36600 WITHDRAWAL OF ARTERIAL BLOOD: CPT

## 2021-05-25 PROCEDURE — 85025 COMPLETE CBC W/AUTO DIFF WBC: CPT | Performed by: INTERNAL MEDICINE

## 2021-05-25 PROCEDURE — 0W3P8ZZ CONTROL BLEEDING IN GASTROINTESTINAL TRACT, VIA NATURAL OR ARTIFICIAL OPENING ENDOSCOPIC: ICD-10-PCS | Performed by: INTERNAL MEDICINE

## 2021-05-25 PROCEDURE — 83735 ASSAY OF MAGNESIUM: CPT | Performed by: INTERNAL MEDICINE

## 2021-05-25 PROCEDURE — 84100 ASSAY OF PHOSPHORUS: CPT | Performed by: INTERNAL MEDICINE

## 2021-05-25 PROCEDURE — 25010000002 FENTANYL CITRATE (PF) 2500 MCG/50ML SOLUTION: Performed by: EMERGENCY MEDICINE

## 2021-05-25 PROCEDURE — 85018 HEMOGLOBIN: CPT | Performed by: INTERNAL MEDICINE

## 2021-05-25 PROCEDURE — 83050 HGB METHEMOGLOBIN QUAN: CPT

## 2021-05-25 PROCEDURE — 82375 ASSAY CARBOXYHB QUANT: CPT

## 2021-05-25 PROCEDURE — 80048 BASIC METABOLIC PNL TOTAL CA: CPT | Performed by: INTERNAL MEDICINE

## 2021-05-25 PROCEDURE — C1889 IMPLANT/INSERT DEVICE, NOC: HCPCS | Performed by: INTERNAL MEDICINE

## 2021-05-25 PROCEDURE — 94799 UNLISTED PULMONARY SVC/PX: CPT

## 2021-05-25 PROCEDURE — 25010000002 MIDAZOLAM PER 1 MG

## 2021-05-25 PROCEDURE — 82805 BLOOD GASES W/O2 SATURATION: CPT

## 2021-05-25 PROCEDURE — 85014 HEMATOCRIT: CPT | Performed by: INTERNAL MEDICINE

## 2021-05-25 PROCEDURE — 99291 CRITICAL CARE FIRST HOUR: CPT | Performed by: INTERNAL MEDICINE

## 2021-05-25 PROCEDURE — 43255 EGD CONTROL BLEEDING ANY: CPT | Performed by: INTERNAL MEDICINE

## 2021-05-25 PROCEDURE — 63710000001 INSULIN REGULAR HUMAN PER 5 UNITS

## 2021-05-25 PROCEDURE — 85007 BL SMEAR W/DIFF WBC COUNT: CPT | Performed by: INTERNAL MEDICINE

## 2021-05-25 PROCEDURE — 82962 GLUCOSE BLOOD TEST: CPT

## 2021-05-25 PROCEDURE — 94003 VENT MGMT INPAT SUBQ DAY: CPT

## 2021-05-25 PROCEDURE — 25010000002 HALOPERIDOL LACTATE PER 5 MG: Performed by: INTERNAL MEDICINE

## 2021-05-25 PROCEDURE — 85610 PROTHROMBIN TIME: CPT | Performed by: INTERNAL MEDICINE

## 2021-05-25 DEVICE — SYS CLIP GI OTSC 11/6T 165CM: Type: IMPLANTABLE DEVICE | Site: DUODENUM | Status: FUNCTIONAL

## 2021-05-25 DEVICE — SYS CLIP GI OTSC NITNL 12/16T 165CM: Type: IMPLANTABLE DEVICE | Site: STOMACH | Status: FUNCTIONAL

## 2021-05-25 RX ORDER — MIDAZOLAM HYDROCHLORIDE 1 MG/ML
INJECTION INTRAMUSCULAR; INTRAVENOUS
Status: COMPLETED
Start: 2021-05-25 | End: 2021-05-25

## 2021-05-25 RX ORDER — HALOPERIDOL 5 MG/ML
5 INJECTION INTRAMUSCULAR EVERY 6 HOURS PRN
Status: DISCONTINUED | OUTPATIENT
Start: 2021-05-25 | End: 2021-05-31

## 2021-05-25 RX ORDER — MIDAZOLAM HYDROCHLORIDE 1 MG/ML
INJECTION INTRAMUSCULAR; INTRAVENOUS
Status: DISPENSED
Start: 2021-05-25 | End: 2021-05-25

## 2021-05-25 RX ADMIN — CHLORHEXIDINE GLUCONATE 15 ML: 1.2 SOLUTION ORAL at 20:02

## 2021-05-25 RX ADMIN — HALOPERIDOL LACTATE 5 MG: 5 INJECTION, SOLUTION INTRAMUSCULAR at 10:27

## 2021-05-25 RX ADMIN — CHLORHEXIDINE GLUCONATE 15 ML: 1.2 SOLUTION ORAL at 10:26

## 2021-05-25 RX ADMIN — DEXMEDETOMIDINE HYDROCHLORIDE 1.5 MCG/KG/HR: 4 INJECTION, SOLUTION INTRAVENOUS at 15:55

## 2021-05-25 RX ADMIN — DEXMEDETOMIDINE HYDROCHLORIDE 1.5 MCG/KG/HR: 4 INJECTION, SOLUTION INTRAVENOUS at 12:32

## 2021-05-25 RX ADMIN — DEXMEDETOMIDINE HYDROCHLORIDE 1.5 MCG/KG/HR: 4 INJECTION, SOLUTION INTRAVENOUS at 08:59

## 2021-05-25 RX ADMIN — PANTOPRAZOLE SODIUM 40 MG: 40 INJECTION, POWDER, FOR SOLUTION INTRAVENOUS at 10:26

## 2021-05-25 RX ADMIN — FENTANYL CITRATE 300 MCG/HR: 0.05 INJECTION, SOLUTION INTRAMUSCULAR; INTRAVENOUS at 21:47

## 2021-05-25 RX ADMIN — DEXTROSE MONOHYDRATE 50 ML/HR: 100 INJECTION, SOLUTION INTRAVENOUS at 07:37

## 2021-05-25 RX ADMIN — HALOPERIDOL LACTATE 5 MG: 5 INJECTION, SOLUTION INTRAMUSCULAR at 22:18

## 2021-05-25 RX ADMIN — FENTANYL CITRATE 300 MCG/HR: 0.05 INJECTION, SOLUTION INTRAMUSCULAR; INTRAVENOUS at 07:37

## 2021-05-25 RX ADMIN — DEXMEDETOMIDINE HYDROCHLORIDE 0.8 MCG/KG/HR: 4 INJECTION, SOLUTION INTRAVENOUS at 04:23

## 2021-05-25 RX ADMIN — Medication 1 PATCH: at 10:28

## 2021-05-25 RX ADMIN — DEXMEDETOMIDINE HYDROCHLORIDE 1.5 MCG/KG/HR: 4 INJECTION, SOLUTION INTRAVENOUS at 20:07

## 2021-05-25 RX ADMIN — SODIUM PHOSPHATE, MONOBASIC, MONOHYDRATE AND SODIUM PHOSPHATE, DIBASIC, ANHYDROUS 30 MMOL: 276; 142 INJECTION, SOLUTION INTRAVENOUS at 05:55

## 2021-05-25 RX ADMIN — MIDAZOLAM HYDROCHLORIDE 2 MG: 1 INJECTION, SOLUTION INTRAMUSCULAR; INTRAVENOUS at 08:15

## 2021-05-25 RX ADMIN — THIAMINE HYDROCHLORIDE 500 MG: 100 INJECTION, SOLUTION INTRAMUSCULAR; INTRAVENOUS at 20:02

## 2021-05-25 RX ADMIN — HALOPERIDOL LACTATE 5 MG: 5 INJECTION, SOLUTION INTRAMUSCULAR at 16:13

## 2021-05-25 RX ADMIN — Medication 0.02 MCG/KG/MIN: at 18:23

## 2021-05-25 RX ADMIN — THIAMINE HYDROCHLORIDE 500 MG: 100 INJECTION, SOLUTION INTRAMUSCULAR; INTRAVENOUS at 14:07

## 2021-05-25 RX ADMIN — SODIUM PHOSPHATE, MONOBASIC, MONOHYDRATE AND SODIUM PHOSPHATE, DIBASIC, ANHYDROUS 30 MMOL: 276; 142 INJECTION, SOLUTION INTRAVENOUS at 21:47

## 2021-05-25 RX ADMIN — FENTANYL CITRATE 300 MCG/HR: 0.05 INJECTION, SOLUTION INTRAMUSCULAR; INTRAVENOUS at 14:54

## 2021-05-25 RX ADMIN — FENTANYL CITRATE 300 MCG/HR: 0.05 INJECTION, SOLUTION INTRAMUSCULAR; INTRAVENOUS at 02:39

## 2021-05-25 RX ADMIN — MIDAZOLAM HYDROCHLORIDE 2 MG: 1 INJECTION, SOLUTION INTRAMUSCULAR; INTRAVENOUS at 07:54

## 2021-05-25 RX ADMIN — PANTOPRAZOLE SODIUM 40 MG: 40 INJECTION, POWDER, FOR SOLUTION INTRAVENOUS at 20:02

## 2021-05-25 RX ADMIN — INSULIN HUMAN 2 UNITS: 100 INJECTION, SOLUTION PARENTERAL at 05:56

## 2021-05-25 RX ADMIN — FOLIC ACID 1 MG: 5 INJECTION, SOLUTION INTRAMUSCULAR; INTRAVENOUS; SUBCUTANEOUS at 10:27

## 2021-05-25 RX ADMIN — THIAMINE HYDROCHLORIDE 500 MG: 100 INJECTION, SOLUTION INTRAMUSCULAR; INTRAVENOUS at 05:56

## 2021-05-25 RX ADMIN — DEXMEDETOMIDINE HYDROCHLORIDE 1.5 MCG/KG/HR: 4 INJECTION, SOLUTION INTRAVENOUS at 23:57

## 2021-05-26 ENCOUNTER — APPOINTMENT (OUTPATIENT)
Dept: GENERAL RADIOLOGY | Facility: HOSPITAL | Age: 61
End: 2021-05-26

## 2021-05-26 LAB
ALBUMIN SERPL-MCNC: 2.2 G/DL (ref 3.5–5.2)
ALBUMIN/GLOB SERPL: 1 G/DL
ALP SERPL-CCNC: 55 U/L (ref 39–117)
ALT SERPL W P-5'-P-CCNC: 222 U/L (ref 1–41)
ANION GAP SERPL CALCULATED.3IONS-SCNC: 9 MMOL/L (ref 5–15)
ARTERIAL PATENCY WRIST A: ABNORMAL
ARTERIAL PATENCY WRIST A: ABNORMAL
AST SERPL-CCNC: 150 U/L (ref 1–40)
ATMOSPHERIC PRESS: ABNORMAL MM[HG]
ATMOSPHERIC PRESS: ABNORMAL MM[HG]
BASE EXCESS BLDA CALC-SCNC: -1 MMOL/L (ref 0–2)
BASE EXCESS BLDA CALC-SCNC: 0.6 MMOL/L (ref 0–2)
BASOPHILS # BLD AUTO: 0.04 10*3/MM3 (ref 0–0.2)
BASOPHILS NFR BLD AUTO: 0.4 % (ref 0–1.5)
BDY SITE: ABNORMAL
BDY SITE: ABNORMAL
BH BB BLOOD EXPIRATION DATE: NORMAL
BH BB BLOOD TYPE BARCODE: 5100
BH BB DISPENSE STATUS: NORMAL
BH BB PRODUCT CODE: NORMAL
BH BB UNIT NUMBER: NORMAL
BILIRUB SERPL-MCNC: 0.5 MG/DL (ref 0–1.2)
BODY TEMPERATURE: 37 C
BODY TEMPERATURE: 37 C
BUN SERPL-MCNC: 17 MG/DL (ref 8–23)
BUN/CREAT SERPL: 17.5 (ref 7–25)
CALCIUM SPEC-SCNC: 7.5 MG/DL (ref 8.6–10.5)
CHLORIDE SERPL-SCNC: 116 MMOL/L (ref 98–107)
CO2 BLDA-SCNC: 25.9 MMOL/L (ref 22–33)
CO2 BLDA-SCNC: 26.3 MMOL/L (ref 22–33)
CO2 SERPL-SCNC: 24 MMOL/L (ref 22–29)
COHGB MFR BLD: 1.1 % (ref 0–2)
COHGB MFR BLD: 1.1 % (ref 0–2)
CREAT SERPL-MCNC: 0.97 MG/DL (ref 0.76–1.27)
CROSSMATCH INTERPRETATION: NORMAL
DEPRECATED RDW RBC AUTO: 55.6 FL (ref 37–54)
EOSINOPHIL # BLD AUTO: 0.2 10*3/MM3 (ref 0–0.4)
EOSINOPHIL NFR BLD AUTO: 1.9 % (ref 0.3–6.2)
EPAP: 0
ERYTHROCYTE [DISTWIDTH] IN BLOOD BY AUTOMATED COUNT: 16.6 % (ref 12.3–15.4)
GFR SERPL CREATININE-BSD FRML MDRD: 79 ML/MIN/1.73
GLOBULIN UR ELPH-MCNC: 2.3 GM/DL
GLUCOSE BLDC GLUCOMTR-MCNC: 109 MG/DL (ref 70–130)
GLUCOSE BLDC GLUCOMTR-MCNC: 113 MG/DL (ref 70–130)
GLUCOSE BLDC GLUCOMTR-MCNC: 118 MG/DL (ref 70–130)
GLUCOSE BLDC GLUCOMTR-MCNC: 152 MG/DL (ref 70–130)
GLUCOSE SERPL-MCNC: 145 MG/DL (ref 65–99)
HCO3 BLDA-SCNC: 24.8 MMOL/L (ref 20–26)
HCO3 BLDA-SCNC: 24.9 MMOL/L (ref 20–26)
HCT VFR BLD AUTO: 26.1 % (ref 37.5–51)
HCT VFR BLD AUTO: 26.7 % (ref 37.5–51)
HCT VFR BLD AUTO: 26.9 % (ref 37.5–51)
HCT VFR BLD AUTO: 26.9 % (ref 37.5–51)
HCT VFR BLD AUTO: 28.4 % (ref 37.5–51)
HCT VFR BLD CALC: 25.9 %
HCT VFR BLD CALC: 26.1 %
HGB BLD-MCNC: 8.1 G/DL (ref 13–17.7)
HGB BLD-MCNC: 8.4 G/DL (ref 13–17.7)
HGB BLD-MCNC: 8.7 G/DL (ref 13–17.7)
HGB BLDA-MCNC: 8.5 G/DL (ref 13.5–17.5)
HGB BLDA-MCNC: 8.5 G/DL (ref 13.5–17.5)
IMM GRANULOCYTES # BLD AUTO: 0.15 10*3/MM3 (ref 0–0.05)
IMM GRANULOCYTES NFR BLD AUTO: 1.4 % (ref 0–0.5)
INHALED O2 CONCENTRATION: 28 %
INHALED O2 CONCENTRATION: 30 %
INR PPP: 1.31 (ref 0.85–1.16)
IPAP: 0
LYMPHOCYTES # BLD AUTO: 1.53 10*3/MM3 (ref 0.7–3.1)
LYMPHOCYTES NFR BLD AUTO: 14.2 % (ref 19.6–45.3)
MAGNESIUM SERPL-MCNC: 1.7 MG/DL (ref 1.6–2.4)
MCH RBC QN AUTO: 28.7 PG (ref 26.6–33)
MCHC RBC AUTO-ENTMCNC: 31.2 G/DL (ref 31.5–35.7)
MCV RBC AUTO: 91.8 FL (ref 79–97)
METHGB BLD QL: 0.3 % (ref 0–1.5)
METHGB BLD QL: 0.5 % (ref 0–1.5)
MODALITY: ABNORMAL
MODALITY: ABNORMAL
MONOCYTES # BLD AUTO: 1.2 10*3/MM3 (ref 0.1–0.9)
MONOCYTES NFR BLD AUTO: 11.2 % (ref 5–12)
NEUTROPHILS NFR BLD AUTO: 7.64 10*3/MM3 (ref 1.7–7)
NEUTROPHILS NFR BLD AUTO: 70.9 % (ref 42.7–76)
NOTE: ABNORMAL
NOTE: ABNORMAL
NRBC BLD AUTO-RTO: 0.3 /100 WBC (ref 0–0.2)
OXYHGB MFR BLDV: 95 % (ref 94–99)
OXYHGB MFR BLDV: 96 % (ref 94–99)
PAW @ PEAK INSP FLOW SETTING VENT: 0 CMH2O
PCO2 BLDA: 36.8 MM HG (ref 35–45)
PCO2 BLDA: 46 MM HG (ref 35–45)
PCO2 TEMP ADJ BLD: 36.8 MM HG (ref 35–48)
PCO2 TEMP ADJ BLD: 46 MM HG (ref 35–48)
PEEP RESPIRATORY: 5 CM[H2O]
PH BLDA: 7.34 PH UNITS (ref 7.35–7.45)
PH BLDA: 7.44 PH UNITS (ref 7.35–7.45)
PH, TEMP CORRECTED: 7.34 PH UNITS
PH, TEMP CORRECTED: 7.44 PH UNITS
PHOSPHATE SERPL-MCNC: 3.2 MG/DL (ref 2.5–4.5)
PLATELET # BLD AUTO: 100 10*3/MM3 (ref 140–450)
PMV BLD AUTO: 10.5 FL (ref 6–12)
PO2 BLDA: 75.1 MM HG (ref 83–108)
PO2 BLDA: 94 MM HG (ref 83–108)
PO2 TEMP ADJ BLD: 75.1 MM HG (ref 83–108)
PO2 TEMP ADJ BLD: 94 MM HG (ref 83–108)
POTASSIUM SERPL-SCNC: 3.6 MMOL/L (ref 3.5–5.2)
PROT SERPL-MCNC: 4.5 G/DL (ref 6–8.5)
PROTHROMBIN TIME: 15.9 SECONDS (ref 11.4–14.4)
RBC # BLD AUTO: 2.93 10*6/MM3 (ref 4.14–5.8)
SODIUM SERPL-SCNC: 149 MMOL/L (ref 136–145)
TOTAL RATE: 0 BREATHS/MINUTE
UNIT  ABO: NORMAL
UNIT  RH: NORMAL
VENTILATOR MODE: ABNORMAL
WBC # BLD AUTO: 10.76 10*3/MM3 (ref 3.4–10.8)

## 2021-05-26 PROCEDURE — 84100 ASSAY OF PHOSPHORUS: CPT | Performed by: INTERNAL MEDICINE

## 2021-05-26 PROCEDURE — 82805 BLOOD GASES W/O2 SATURATION: CPT

## 2021-05-26 PROCEDURE — 83735 ASSAY OF MAGNESIUM: CPT | Performed by: INTERNAL MEDICINE

## 2021-05-26 PROCEDURE — 99291 CRITICAL CARE FIRST HOUR: CPT | Performed by: INTERNAL MEDICINE

## 2021-05-26 PROCEDURE — 94799 UNLISTED PULMONARY SVC/PX: CPT

## 2021-05-26 PROCEDURE — 25010000002 HALOPERIDOL LACTATE PER 5 MG: Performed by: INTERNAL MEDICINE

## 2021-05-26 PROCEDURE — 25010000002 FENTANYL CITRATE (PF) 2500 MCG/50ML SOLUTION: Performed by: EMERGENCY MEDICINE

## 2021-05-26 PROCEDURE — 82962 GLUCOSE BLOOD TEST: CPT

## 2021-05-26 PROCEDURE — 85018 HEMOGLOBIN: CPT | Performed by: INTERNAL MEDICINE

## 2021-05-26 PROCEDURE — 85610 PROTHROMBIN TIME: CPT | Performed by: INTERNAL MEDICINE

## 2021-05-26 PROCEDURE — 85014 HEMATOCRIT: CPT | Performed by: INTERNAL MEDICINE

## 2021-05-26 PROCEDURE — 31500 INSERT EMERGENCY AIRWAY: CPT

## 2021-05-26 PROCEDURE — 63710000001 INSULIN REGULAR HUMAN PER 5 UNITS

## 2021-05-26 PROCEDURE — 99232 SBSQ HOSP IP/OBS MODERATE 35: CPT | Performed by: PHYSICIAN ASSISTANT

## 2021-05-26 PROCEDURE — 83050 HGB METHEMOGLOBIN QUAN: CPT

## 2021-05-26 PROCEDURE — 94003 VENT MGMT INPAT SUBQ DAY: CPT

## 2021-05-26 PROCEDURE — 25010000002 THIAMINE PER 100 MG: Performed by: INTERNAL MEDICINE

## 2021-05-26 PROCEDURE — 71045 X-RAY EXAM CHEST 1 VIEW: CPT

## 2021-05-26 PROCEDURE — 80053 COMPREHEN METABOLIC PANEL: CPT | Performed by: INTERNAL MEDICINE

## 2021-05-26 PROCEDURE — 85025 COMPLETE CBC W/AUTO DIFF WBC: CPT | Performed by: INTERNAL MEDICINE

## 2021-05-26 PROCEDURE — 25010000003 MAGNESIUM SULFATE 4 GM/100ML SOLUTION: Performed by: INTERNAL MEDICINE

## 2021-05-26 PROCEDURE — 82375 ASSAY CARBOXYHB QUANT: CPT

## 2021-05-26 PROCEDURE — 94660 CPAP INITIATION&MGMT: CPT

## 2021-05-26 PROCEDURE — 36600 WITHDRAWAL OF ARTERIAL BLOOD: CPT

## 2021-05-26 RX ORDER — ETOMIDATE 2 MG/ML
20 INJECTION INTRAVENOUS ONCE
Status: COMPLETED | OUTPATIENT
Start: 2021-05-26 | End: 2021-05-26

## 2021-05-26 RX ORDER — ETOMIDATE 2 MG/ML
INJECTION INTRAVENOUS
Status: COMPLETED
Start: 2021-05-26 | End: 2021-05-26

## 2021-05-26 RX ORDER — RISPERIDONE 1 MG/ML
2 SOLUTION ORAL EVERY 12 HOURS SCHEDULED
Status: DISCONTINUED | OUTPATIENT
Start: 2021-05-26 | End: 2021-05-26

## 2021-05-26 RX ORDER — MAGNESIUM SULFATE HEPTAHYDRATE 40 MG/ML
4 INJECTION, SOLUTION INTRAVENOUS AS NEEDED
Status: DISCONTINUED | OUTPATIENT
Start: 2021-05-26 | End: 2021-05-31

## 2021-05-26 RX ADMIN — RISPERIDONE 2 MG: 1 SOLUTION ORAL at 10:34

## 2021-05-26 RX ADMIN — Medication 1 PATCH: at 08:41

## 2021-05-26 RX ADMIN — CHLORHEXIDINE GLUCONATE 15 ML: 1.2 SOLUTION ORAL at 08:41

## 2021-05-26 RX ADMIN — FOLIC ACID 1 MG: 5 INJECTION, SOLUTION INTRAMUSCULAR; INTRAVENOUS; SUBCUTANEOUS at 08:41

## 2021-05-26 RX ADMIN — PANTOPRAZOLE SODIUM 40 MG: 40 INJECTION, POWDER, FOR SOLUTION INTRAVENOUS at 20:00

## 2021-05-26 RX ADMIN — ETOMIDATE 20 MG: 2 INJECTION INTRAVENOUS at 05:07

## 2021-05-26 RX ADMIN — DEXTROSE MONOHYDRATE 40 ML/HR: 100 INJECTION, SOLUTION INTRAVENOUS at 05:34

## 2021-05-26 RX ADMIN — THIAMINE HYDROCHLORIDE 500 MG: 100 INJECTION, SOLUTION INTRAMUSCULAR; INTRAVENOUS at 14:47

## 2021-05-26 RX ADMIN — DEXMEDETOMIDINE HYDROCHLORIDE 1.5 MCG/KG/HR: 4 INJECTION, SOLUTION INTRAVENOUS at 07:07

## 2021-05-26 RX ADMIN — ETOMIDATE 20 MG: 2 INJECTION, SOLUTION INTRAVENOUS at 05:07

## 2021-05-26 RX ADMIN — HALOPERIDOL LACTATE 5 MG: 5 INJECTION, SOLUTION INTRAMUSCULAR at 05:55

## 2021-05-26 RX ADMIN — HALOPERIDOL LACTATE 5 MG: 5 INJECTION, SOLUTION INTRAMUSCULAR at 11:59

## 2021-05-26 RX ADMIN — FENTANYL CITRATE 300 MCG/HR: 0.05 INJECTION, SOLUTION INTRAMUSCULAR; INTRAVENOUS at 06:36

## 2021-05-26 RX ADMIN — PANTOPRAZOLE SODIUM 40 MG: 40 INJECTION, POWDER, FOR SOLUTION INTRAVENOUS at 08:41

## 2021-05-26 RX ADMIN — INSULIN HUMAN 2 UNITS: 100 INJECTION, SOLUTION PARENTERAL at 05:34

## 2021-05-26 RX ADMIN — MAGNESIUM SULFATE HEPTAHYDRATE 4 G: 40 INJECTION, SOLUTION INTRAVENOUS at 08:41

## 2021-05-26 RX ADMIN — DEXMEDETOMIDINE HYDROCHLORIDE 1.5 MCG/KG/HR: 4 INJECTION, SOLUTION INTRAVENOUS at 03:16

## 2021-05-26 RX ADMIN — DEXMEDETOMIDINE HYDROCHLORIDE 1.5 MCG/KG/HR: 4 INJECTION, SOLUTION INTRAVENOUS at 10:34

## 2021-05-26 RX ADMIN — THIAMINE HYDROCHLORIDE 500 MG: 100 INJECTION, SOLUTION INTRAMUSCULAR; INTRAVENOUS at 05:55

## 2021-05-26 RX ADMIN — THIAMINE HYDROCHLORIDE 500 MG: 100 INJECTION, SOLUTION INTRAMUSCULAR; INTRAVENOUS at 21:58

## 2021-05-26 RX ADMIN — DEXMEDETOMIDINE HYDROCHLORIDE 0.2 MCG/KG/HR: 4 INJECTION, SOLUTION INTRAVENOUS at 23:45

## 2021-05-27 ENCOUNTER — APPOINTMENT (OUTPATIENT)
Dept: GENERAL RADIOLOGY | Facility: HOSPITAL | Age: 61
End: 2021-05-27

## 2021-05-27 LAB
ANION GAP SERPL CALCULATED.3IONS-SCNC: 6 MMOL/L (ref 5–15)
ARTERIAL PATENCY WRIST A: ABNORMAL
ATMOSPHERIC PRESS: ABNORMAL MM[HG]
BACTERIA SPEC AEROBE CULT: NORMAL
BACTERIA SPEC AEROBE CULT: NORMAL
BASE EXCESS BLDA CALC-SCNC: 1.5 MMOL/L (ref 0–2)
BASOPHILS # BLD AUTO: 0.03 10*3/MM3 (ref 0–0.2)
BASOPHILS NFR BLD AUTO: 0.3 % (ref 0–1.5)
BDY SITE: ABNORMAL
BODY TEMPERATURE: 37 C
BUN SERPL-MCNC: 17 MG/DL (ref 8–23)
BUN/CREAT SERPL: 16 (ref 7–25)
CALCIUM SPEC-SCNC: 7.5 MG/DL (ref 8.6–10.5)
CHLORIDE SERPL-SCNC: 112 MMOL/L (ref 98–107)
CO2 BLDA-SCNC: 27.1 MMOL/L (ref 22–33)
CO2 SERPL-SCNC: 25 MMOL/L (ref 22–29)
COHGB MFR BLD: 0.8 % (ref 0–2)
CREAT SERPL-MCNC: 1.06 MG/DL (ref 0.76–1.27)
CYTO UR: NORMAL
DEPRECATED RDW RBC AUTO: 53.6 FL (ref 37–54)
EOSINOPHIL # BLD AUTO: 0.19 10*3/MM3 (ref 0–0.4)
EOSINOPHIL NFR BLD AUTO: 2.1 % (ref 0.3–6.2)
EPAP: 0
ERYTHROCYTE [DISTWIDTH] IN BLOOD BY AUTOMATED COUNT: 16.5 % (ref 12.3–15.4)
GFR SERPL CREATININE-BSD FRML MDRD: 71 ML/MIN/1.73
GLUCOSE BLDC GLUCOMTR-MCNC: 134 MG/DL (ref 70–130)
GLUCOSE BLDC GLUCOMTR-MCNC: 141 MG/DL (ref 70–130)
GLUCOSE BLDC GLUCOMTR-MCNC: 143 MG/DL (ref 70–130)
GLUCOSE BLDC GLUCOMTR-MCNC: 145 MG/DL (ref 70–130)
GLUCOSE SERPL-MCNC: 145 MG/DL (ref 65–99)
HCO3 BLDA-SCNC: 25.9 MMOL/L (ref 20–26)
HCT VFR BLD AUTO: 25.6 % (ref 37.5–51)
HCT VFR BLD AUTO: 26.2 % (ref 37.5–51)
HCT VFR BLD AUTO: 31.7 % (ref 37.5–51)
HCT VFR BLD CALC: 27.8 %
HGB BLD-MCNC: 10.1 G/DL (ref 13–17.7)
HGB BLD-MCNC: 8 G/DL (ref 13–17.7)
HGB BLD-MCNC: 8.2 G/DL (ref 13–17.7)
HGB BLDA-MCNC: 9.1 G/DL (ref 13.5–17.5)
IMM GRANULOCYTES # BLD AUTO: 0.22 10*3/MM3 (ref 0–0.05)
IMM GRANULOCYTES NFR BLD AUTO: 2.4 % (ref 0–0.5)
INHALED O2 CONCENTRATION: 28 %
IPAP: 0
LAB AP CASE REPORT: NORMAL
LAB AP CLINICAL INFORMATION: NORMAL
LAB AP SPECIAL STAINS: NORMAL
LYMPHOCYTES # BLD AUTO: 1.1 10*3/MM3 (ref 0.7–3.1)
LYMPHOCYTES NFR BLD AUTO: 12 % (ref 19.6–45.3)
MAGNESIUM SERPL-MCNC: 2.3 MG/DL (ref 1.6–2.4)
MCH RBC QN AUTO: 28.3 PG (ref 26.6–33)
MCHC RBC AUTO-ENTMCNC: 31.3 G/DL (ref 31.5–35.7)
MCV RBC AUTO: 90.5 FL (ref 79–97)
METHGB BLD QL: 0.5 % (ref 0–1.5)
MODALITY: ABNORMAL
MONOCYTES # BLD AUTO: 1.04 10*3/MM3 (ref 0.1–0.9)
MONOCYTES NFR BLD AUTO: 11.3 % (ref 5–12)
NEUTROPHILS NFR BLD AUTO: 6.62 10*3/MM3 (ref 1.7–7)
NEUTROPHILS NFR BLD AUTO: 71.9 % (ref 42.7–76)
NOTE: ABNORMAL
NRBC BLD AUTO-RTO: 0.2 /100 WBC (ref 0–0.2)
OXYHGB MFR BLDV: 96.8 % (ref 94–99)
PATH REPORT.FINAL DX SPEC: NORMAL
PATH REPORT.GROSS SPEC: NORMAL
PAW @ PEAK INSP FLOW SETTING VENT: 0 CMH2O
PCO2 BLDA: 38.9 MM HG (ref 35–45)
PCO2 TEMP ADJ BLD: 38.9 MM HG (ref 35–48)
PH BLDA: 7.43 PH UNITS (ref 7.35–7.45)
PH, TEMP CORRECTED: 7.43 PH UNITS
PHOSPHATE SERPL-MCNC: 2.8 MG/DL (ref 2.5–4.5)
PLATELET # BLD AUTO: 130 10*3/MM3 (ref 140–450)
PMV BLD AUTO: 9.9 FL (ref 6–12)
PO2 BLDA: 92.7 MM HG (ref 83–108)
PO2 TEMP ADJ BLD: 92.7 MM HG (ref 83–108)
POTASSIUM SERPL-SCNC: 3 MMOL/L (ref 3.5–5.2)
POTASSIUM SERPL-SCNC: 3.1 MMOL/L (ref 3.5–5.2)
RBC # BLD AUTO: 2.83 10*6/MM3 (ref 4.14–5.8)
SODIUM SERPL-SCNC: 143 MMOL/L (ref 136–145)
TOTAL RATE: 0 BREATHS/MINUTE
WBC # BLD AUTO: 9.2 10*3/MM3 (ref 3.4–10.8)

## 2021-05-27 PROCEDURE — 83050 HGB METHEMOGLOBIN QUAN: CPT

## 2021-05-27 PROCEDURE — 25010000002 CALCIUM GLUCONATE PER 10 ML: Performed by: INTERNAL MEDICINE

## 2021-05-27 PROCEDURE — 25010000002 THIAMINE PER 100 MG: Performed by: INTERNAL MEDICINE

## 2021-05-27 PROCEDURE — 36600 WITHDRAWAL OF ARTERIAL BLOOD: CPT

## 2021-05-27 PROCEDURE — 82375 ASSAY CARBOXYHB QUANT: CPT

## 2021-05-27 PROCEDURE — 25010000002 MAGNESIUM SULFATE PER 500 MG OF MAGNESIUM: Performed by: INTERNAL MEDICINE

## 2021-05-27 PROCEDURE — 25010000002 POTASSIUM CHLORIDE PER 2 MEQ OF POTASSIUM: Performed by: INTERNAL MEDICINE

## 2021-05-27 PROCEDURE — 94799 UNLISTED PULMONARY SVC/PX: CPT

## 2021-05-27 PROCEDURE — 02HV33Z INSERTION OF INFUSION DEVICE INTO SUPERIOR VENA CAVA, PERCUTANEOUS APPROACH: ICD-10-PCS | Performed by: INTERNAL MEDICINE

## 2021-05-27 PROCEDURE — C1751 CATH, INF, PER/CENT/MIDLINE: HCPCS

## 2021-05-27 PROCEDURE — 85025 COMPLETE CBC W/AUTO DIFF WBC: CPT | Performed by: INTERNAL MEDICINE

## 2021-05-27 PROCEDURE — 85018 HEMOGLOBIN: CPT | Performed by: INTERNAL MEDICINE

## 2021-05-27 PROCEDURE — 99232 SBSQ HOSP IP/OBS MODERATE 35: CPT | Performed by: NURSE PRACTITIONER

## 2021-05-27 PROCEDURE — 71045 X-RAY EXAM CHEST 1 VIEW: CPT

## 2021-05-27 PROCEDURE — 84100 ASSAY OF PHOSPHORUS: CPT | Performed by: INTERNAL MEDICINE

## 2021-05-27 PROCEDURE — 83735 ASSAY OF MAGNESIUM: CPT | Performed by: INTERNAL MEDICINE

## 2021-05-27 PROCEDURE — 99291 CRITICAL CARE FIRST HOUR: CPT | Performed by: INTERNAL MEDICINE

## 2021-05-27 PROCEDURE — 84132 ASSAY OF SERUM POTASSIUM: CPT | Performed by: INTERNAL MEDICINE

## 2021-05-27 PROCEDURE — 82805 BLOOD GASES W/O2 SATURATION: CPT

## 2021-05-27 PROCEDURE — 85014 HEMATOCRIT: CPT | Performed by: INTERNAL MEDICINE

## 2021-05-27 PROCEDURE — 25010000003 POTASSIUM CHLORIDE PER 2 MEQ

## 2021-05-27 PROCEDURE — 82962 GLUCOSE BLOOD TEST: CPT

## 2021-05-27 PROCEDURE — 80048 BASIC METABOLIC PNL TOTAL CA: CPT | Performed by: INTERNAL MEDICINE

## 2021-05-27 PROCEDURE — C1894 INTRO/SHEATH, NON-LASER: HCPCS

## 2021-05-27 PROCEDURE — 25010000002 ONDANSETRON PER 1 MG: Performed by: NURSE PRACTITIONER

## 2021-05-27 RX ORDER — POTASSIUM CHLORIDE 29.8 MG/ML
20 INJECTION INTRAVENOUS
Status: DISCONTINUED | OUTPATIENT
Start: 2021-05-27 | End: 2021-06-03 | Stop reason: HOSPADM

## 2021-05-27 RX ORDER — SODIUM CHLORIDE 0.9 % (FLUSH) 0.9 %
10 SYRINGE (ML) INJECTION AS NEEDED
Status: DISCONTINUED | OUTPATIENT
Start: 2021-05-27 | End: 2021-06-03 | Stop reason: HOSPADM

## 2021-05-27 RX ORDER — POTASSIUM CHLORIDE 7.45 MG/ML
10 INJECTION INTRAVENOUS
Status: DISCONTINUED | OUTPATIENT
Start: 2021-05-27 | End: 2021-05-27

## 2021-05-27 RX ORDER — SODIUM CHLORIDE 0.9 % (FLUSH) 0.9 %
10 SYRINGE (ML) INJECTION EVERY 12 HOURS SCHEDULED
Status: DISCONTINUED | OUTPATIENT
Start: 2021-05-27 | End: 2021-06-03 | Stop reason: HOSPADM

## 2021-05-27 RX ORDER — BUMETANIDE 0.25 MG/ML
2.5 INJECTION INTRAMUSCULAR; INTRAVENOUS ONCE
Status: COMPLETED | OUTPATIENT
Start: 2021-05-27 | End: 2021-05-27

## 2021-05-27 RX ORDER — ONDANSETRON 2 MG/ML
4 INJECTION INTRAMUSCULAR; INTRAVENOUS EVERY 6 HOURS PRN
Status: DISCONTINUED | OUTPATIENT
Start: 2021-05-27 | End: 2021-06-03 | Stop reason: HOSPADM

## 2021-05-27 RX ORDER — POTASSIUM CHLORIDE 1.5 G/1.77G
40 POWDER, FOR SOLUTION ORAL AS NEEDED
Status: DISCONTINUED | OUTPATIENT
Start: 2021-05-27 | End: 2021-05-28

## 2021-05-27 RX ORDER — POTASSIUM CHLORIDE 750 MG/1
40 CAPSULE, EXTENDED RELEASE ORAL AS NEEDED
Status: DISCONTINUED | OUTPATIENT
Start: 2021-05-27 | End: 2021-05-27

## 2021-05-27 RX ADMIN — POTASSIUM PHOSPHATE, MONOBASIC POTASSIUM PHOSPHATE, DIBASIC: 224; 236 INJECTION, SOLUTION, CONCENTRATE INTRAVENOUS at 18:35

## 2021-05-27 RX ADMIN — POTASSIUM CHLORIDE 40 MEQ: 1.5 POWDER, FOR SOLUTION ORAL at 11:13

## 2021-05-27 RX ADMIN — POTASSIUM CHLORIDE 20 MEQ: 29.8 INJECTION, SOLUTION INTRAVENOUS at 23:59

## 2021-05-27 RX ADMIN — FOLIC ACID 1 MG: 5 INJECTION, SOLUTION INTRAMUSCULAR; INTRAVENOUS; SUBCUTANEOUS at 09:02

## 2021-05-27 RX ADMIN — ONDANSETRON 4 MG: 2 INJECTION INTRAMUSCULAR; INTRAVENOUS at 13:12

## 2021-05-27 RX ADMIN — POTASSIUM CHLORIDE 20 MEQ: 29.8 INJECTION, SOLUTION INTRAVENOUS at 13:07

## 2021-05-27 RX ADMIN — THIAMINE HYDROCHLORIDE 500 MG: 100 INJECTION, SOLUTION INTRAMUSCULAR; INTRAVENOUS at 05:21

## 2021-05-27 RX ADMIN — SMOFLIPID 200 ML: 6; 6; 5; 3 INJECTION, EMULSION INTRAVENOUS at 18:35

## 2021-05-27 RX ADMIN — THIAMINE HYDROCHLORIDE 500 MG: 100 INJECTION, SOLUTION INTRAMUSCULAR; INTRAVENOUS at 15:46

## 2021-05-27 RX ADMIN — ONDANSETRON 4 MG: 2 INJECTION INTRAMUSCULAR; INTRAVENOUS at 06:36

## 2021-05-27 RX ADMIN — DEXTROSE MONOHYDRATE 40 ML/HR: 100 INJECTION, SOLUTION INTRAVENOUS at 13:05

## 2021-05-27 RX ADMIN — PANTOPRAZOLE SODIUM 40 MG: 40 INJECTION, POWDER, FOR SOLUTION INTRAVENOUS at 20:06

## 2021-05-27 RX ADMIN — POTASSIUM CHLORIDE 20 MEQ: 29.8 INJECTION, SOLUTION INTRAVENOUS at 22:24

## 2021-05-27 RX ADMIN — PANTOPRAZOLE SODIUM 40 MG: 40 INJECTION, POWDER, FOR SOLUTION INTRAVENOUS at 09:02

## 2021-05-27 RX ADMIN — POTASSIUM CHLORIDE 20 MEQ: 29.8 INJECTION, SOLUTION INTRAVENOUS at 14:13

## 2021-05-27 RX ADMIN — BUMETANIDE 2.5 MG: 0.25 INJECTION, SOLUTION INTRAMUSCULAR; INTRAVENOUS at 13:13

## 2021-05-27 RX ADMIN — SODIUM CHLORIDE, PRESERVATIVE FREE 10 ML: 5 INJECTION INTRAVENOUS at 13:12

## 2021-05-27 RX ADMIN — THIAMINE HYDROCHLORIDE 500 MG: 100 INJECTION, SOLUTION INTRAMUSCULAR; INTRAVENOUS at 22:24

## 2021-05-27 RX ADMIN — Medication 1 PATCH: at 09:08

## 2021-05-27 RX ADMIN — POTASSIUM CHLORIDE 40 MEQ: 1.5 POWDER, FOR SOLUTION ORAL at 06:36

## 2021-05-28 ENCOUNTER — ANESTHESIA (OUTPATIENT)
Dept: PERIOP | Facility: HOSPITAL | Age: 61
End: 2021-05-28

## 2021-05-28 ENCOUNTER — APPOINTMENT (OUTPATIENT)
Dept: GENERAL RADIOLOGY | Facility: HOSPITAL | Age: 61
End: 2021-05-28

## 2021-05-28 ENCOUNTER — ANESTHESIA EVENT (OUTPATIENT)
Dept: PERIOP | Facility: HOSPITAL | Age: 61
End: 2021-05-28

## 2021-05-28 LAB
ABO GROUP BLD: NORMAL
ALBUMIN SERPL-MCNC: 2.9 G/DL (ref 3.5–5.2)
ALBUMIN SERPL-MCNC: 3 G/DL (ref 3.5–5.2)
ALBUMIN/GLOB SERPL: 0.9 G/DL
ALBUMIN/GLOB SERPL: 1 G/DL
ALP SERPL-CCNC: 106 U/L (ref 39–117)
ALP SERPL-CCNC: 122 U/L (ref 39–117)
ALT SERPL W P-5'-P-CCNC: 114 U/L (ref 1–41)
ALT SERPL W P-5'-P-CCNC: 136 U/L (ref 1–41)
ANION GAP SERPL CALCULATED.3IONS-SCNC: 13 MMOL/L (ref 5–15)
ANION GAP SERPL CALCULATED.3IONS-SCNC: 8 MMOL/L (ref 5–15)
ANION GAP SERPL CALCULATED.3IONS-SCNC: 9 MMOL/L (ref 5–15)
APTT PPP: 28.3 SECONDS (ref 22–39)
APTT PPP: 29.2 SECONDS (ref 22–39)
ARTERIAL PATENCY WRIST A: ABNORMAL
AST SERPL-CCNC: 54 U/L (ref 1–40)
AST SERPL-CCNC: 54 U/L (ref 1–40)
ATMOSPHERIC PRESS: ABNORMAL MM[HG]
BASE EXCESS BLDA CALC-SCNC: 8.3 MMOL/L (ref 0–2)
BASE EXCESS BLDA CALC-SCNC: 8.3 MMOL/L (ref 0–2)
BASE EXCESS BLDA CALC-SCNC: 8.7 MMOL/L (ref 0–2)
BASE EXCESS BLDA CALC-SCNC: 9.9 MMOL/L (ref 0–2)
BASOPHILS # BLD AUTO: 0.05 10*3/MM3 (ref 0–0.2)
BASOPHILS # BLD AUTO: 0.06 10*3/MM3 (ref 0–0.2)
BASOPHILS NFR BLD AUTO: 0.3 % (ref 0–1.5)
BASOPHILS NFR BLD AUTO: 0.4 % (ref 0–1.5)
BDY SITE: ABNORMAL
BILIRUB SERPL-MCNC: 0.8 MG/DL (ref 0–1.2)
BILIRUB SERPL-MCNC: 0.8 MG/DL (ref 0–1.2)
BLD GP AB SCN SERPL QL: NEGATIVE
BODY TEMPERATURE: 37 C
BUN SERPL-MCNC: 11 MG/DL (ref 8–23)
BUN SERPL-MCNC: 14 MG/DL (ref 8–23)
BUN SERPL-MCNC: 14 MG/DL (ref 8–23)
BUN/CREAT SERPL: 13.1 (ref 7–25)
BUN/CREAT SERPL: 16.5 (ref 7–25)
BUN/CREAT SERPL: 9.8 (ref 7–25)
CA-I SERPL ISE-MCNC: 1.16 MMOL/L (ref 1.12–1.32)
CALCIUM SPEC-SCNC: 8.5 MG/DL (ref 8.6–10.5)
CALCIUM SPEC-SCNC: 8.5 MG/DL (ref 8.6–10.5)
CALCIUM SPEC-SCNC: 9 MG/DL (ref 8.6–10.5)
CHLORIDE SERPL-SCNC: 100 MMOL/L (ref 98–107)
CHLORIDE SERPL-SCNC: 106 MMOL/L (ref 98–107)
CHLORIDE SERPL-SCNC: 99 MMOL/L (ref 98–107)
CO2 BLDA-SCNC: 32.6 MMOL/L (ref 22–33)
CO2 BLDA-SCNC: 34.1 MMOL/L (ref 22–33)
CO2 BLDA-SCNC: 34.8 MMOL/L (ref 22–33)
CO2 BLDA-SCNC: 36.2 MMOL/L (ref 22–33)
CO2 SERPL-SCNC: 29 MMOL/L (ref 22–29)
CO2 SERPL-SCNC: 31 MMOL/L (ref 22–29)
CO2 SERPL-SCNC: 34 MMOL/L (ref 22–29)
COHGB MFR BLD: 0.3 % (ref 0–2)
COHGB MFR BLD: 1 % (ref 0–2)
CREAT SERPL-MCNC: 0.85 MG/DL (ref 0.76–1.27)
CREAT SERPL-MCNC: 1.07 MG/DL (ref 0.76–1.27)
CREAT SERPL-MCNC: 1.12 MG/DL (ref 0.76–1.27)
DEPRECATED RDW RBC AUTO: 50 FL (ref 37–54)
DEPRECATED RDW RBC AUTO: 50.4 FL (ref 37–54)
DEPRECATED RDW RBC AUTO: 50.8 FL (ref 37–54)
DEPRECATED RDW RBC AUTO: 52.7 FL (ref 37–54)
EOSINOPHIL # BLD AUTO: 0.01 10*3/MM3 (ref 0–0.4)
EOSINOPHIL # BLD AUTO: 0.04 10*3/MM3 (ref 0–0.4)
EOSINOPHIL NFR BLD AUTO: 0.1 % (ref 0.3–6.2)
EOSINOPHIL NFR BLD AUTO: 0.3 % (ref 0.3–6.2)
EPAP: 0
ERYTHROCYTE [DISTWIDTH] IN BLOOD BY AUTOMATED COUNT: 16.1 % (ref 12.3–15.4)
ERYTHROCYTE [DISTWIDTH] IN BLOOD BY AUTOMATED COUNT: 16.2 % (ref 12.3–15.4)
ERYTHROCYTE [DISTWIDTH] IN BLOOD BY AUTOMATED COUNT: 16.3 % (ref 12.3–15.4)
ERYTHROCYTE [DISTWIDTH] IN BLOOD BY AUTOMATED COUNT: 17.4 % (ref 12.3–15.4)
ERYTHROCYTE [SEDIMENTATION RATE] IN BLOOD: 26 MM/HR (ref 0–20)
FIBRINOGEN PPP-MCNC: 540 MG/DL (ref 220–470)
GFR SERPL CREATININE-BSD FRML MDRD: 67 ML/MIN/1.73
GFR SERPL CREATININE-BSD FRML MDRD: 70 ML/MIN/1.73
GFR SERPL CREATININE-BSD FRML MDRD: 92 ML/MIN/1.73
GLOBULIN UR ELPH-MCNC: 2.9 GM/DL
GLOBULIN UR ELPH-MCNC: 3.4 GM/DL
GLUCOSE BLDC GLUCOMTR-MCNC: 123 MG/DL (ref 70–130)
GLUCOSE BLDC GLUCOMTR-MCNC: 163 MG/DL (ref 70–130)
GLUCOSE BLDC GLUCOMTR-MCNC: 191 MG/DL (ref 70–130)
GLUCOSE BLDC GLUCOMTR-MCNC: 215 MG/DL (ref 70–130)
GLUCOSE SERPL-MCNC: 148 MG/DL (ref 65–99)
GLUCOSE SERPL-MCNC: 188 MG/DL (ref 65–99)
GLUCOSE SERPL-MCNC: 223 MG/DL (ref 65–99)
HCO3 BLDA-SCNC: 31.4 MMOL/L (ref 20–26)
HCO3 BLDA-SCNC: 32.8 MMOL/L (ref 20–26)
HCO3 BLDA-SCNC: 33.4 MMOL/L (ref 20–26)
HCO3 BLDA-SCNC: 34.7 MMOL/L (ref 20–26)
HCT VFR BLD AUTO: 29.7 % (ref 37.5–51)
HCT VFR BLD AUTO: 30.1 % (ref 37.5–51)
HCT VFR BLD AUTO: 30.5 % (ref 37.5–51)
HCT VFR BLD AUTO: 34.8 % (ref 37.5–51)
HCT VFR BLD AUTO: 37 % (ref 37.5–51)
HCT VFR BLD AUTO: 37.9 % (ref 37.5–51)
HCT VFR BLD CALC: 29 %
HCT VFR BLD CALC: 31.6 %
HCT VFR BLD CALC: 31.8 %
HCT VFR BLD CALC: 32.3 %
HGB BLD-MCNC: 11.3 G/DL (ref 13–17.7)
HGB BLD-MCNC: 12 G/DL (ref 13–17.7)
HGB BLD-MCNC: 12 G/DL (ref 13–17.7)
HGB BLD-MCNC: 9.6 G/DL (ref 13–17.7)
HGB BLD-MCNC: 9.7 G/DL (ref 13–17.7)
HGB BLD-MCNC: 9.7 G/DL (ref 13–17.7)
HGB BLDA-MCNC: 10.3 G/DL (ref 13.5–17.5)
HGB BLDA-MCNC: 10.4 G/DL (ref 13.5–17.5)
HGB BLDA-MCNC: 10.5 G/DL (ref 13.5–17.5)
HGB BLDA-MCNC: 9.5 G/DL (ref 13.5–17.5)
IMM GRANULOCYTES # BLD AUTO: 0.16 10*3/MM3 (ref 0–0.05)
IMM GRANULOCYTES # BLD AUTO: 0.25 10*3/MM3 (ref 0–0.05)
IMM GRANULOCYTES NFR BLD AUTO: 1 % (ref 0–0.5)
IMM GRANULOCYTES NFR BLD AUTO: 1.8 % (ref 0–0.5)
INHALED O2 CONCENTRATION: 100 %
INHALED O2 CONCENTRATION: 100 %
INHALED O2 CONCENTRATION: 40 %
INHALED O2 CONCENTRATION: 90 %
INR PPP: 1.22 (ref 0.85–1.16)
INR PPP: 1.29 (ref 0.85–1.16)
IPAP: 0
LYMPHOCYTES # BLD AUTO: 0.69 10*3/MM3 (ref 0.7–3.1)
LYMPHOCYTES # BLD AUTO: 1.42 10*3/MM3 (ref 0.7–3.1)
LYMPHOCYTES NFR BLD AUTO: 4.9 % (ref 19.6–45.3)
LYMPHOCYTES NFR BLD AUTO: 9.2 % (ref 19.6–45.3)
Lab: ABNORMAL
MAGNESIUM SERPL-MCNC: 1.5 MG/DL (ref 1.6–2.4)
MCH RBC QN AUTO: 27.5 PG (ref 26.6–33)
MCH RBC QN AUTO: 28.4 PG (ref 26.6–33)
MCH RBC QN AUTO: 28.5 PG (ref 26.6–33)
MCH RBC QN AUTO: 28.6 PG (ref 26.6–33)
MCHC RBC AUTO-ENTMCNC: 31.7 G/DL (ref 31.5–35.7)
MCHC RBC AUTO-ENTMCNC: 31.8 G/DL (ref 31.5–35.7)
MCHC RBC AUTO-ENTMCNC: 32.2 G/DL (ref 31.5–35.7)
MCHC RBC AUTO-ENTMCNC: 32.5 G/DL (ref 31.5–35.7)
MCV RBC AUTO: 86.9 FL (ref 79–97)
MCV RBC AUTO: 88.1 FL (ref 79–97)
MCV RBC AUTO: 88.3 FL (ref 79–97)
MCV RBC AUTO: 89.7 FL (ref 79–97)
METHGB BLD QL: -0.2 % (ref 0–1.5)
METHGB BLD QL: 0.4 % (ref 0–1.5)
METHGB BLD QL: 0.4 % (ref 0–1.5)
METHGB BLD QL: 0.5 % (ref 0–1.5)
MODALITY: ABNORMAL
MONOCYTES # BLD AUTO: 1.1 10*3/MM3 (ref 0.1–0.9)
MONOCYTES # BLD AUTO: 1.42 10*3/MM3 (ref 0.1–0.9)
MONOCYTES NFR BLD AUTO: 7.8 % (ref 5–12)
MONOCYTES NFR BLD AUTO: 9.2 % (ref 5–12)
NEUTROPHILS NFR BLD AUTO: 11.91 10*3/MM3 (ref 1.7–7)
NEUTROPHILS NFR BLD AUTO: 12.37 10*3/MM3 (ref 1.7–7)
NEUTROPHILS NFR BLD AUTO: 80 % (ref 42.7–76)
NEUTROPHILS NFR BLD AUTO: 85 % (ref 42.7–76)
NOTE: ABNORMAL
NOTIFIED BY: ABNORMAL
NOTIFIED WHO: ABNORMAL
NRBC BLD AUTO-RTO: 0 /100 WBC (ref 0–0.2)
NRBC BLD AUTO-RTO: 0.1 /100 WBC (ref 0–0.2)
OXYHGB MFR BLDV: 91.3 % (ref 94–99)
OXYHGB MFR BLDV: 98.5 % (ref 94–99)
OXYHGB MFR BLDV: 99 % (ref 94–99)
OXYHGB MFR BLDV: ABNORMAL %
PAW @ PEAK INSP FLOW SETTING VENT: 0 CMH2O
PCO2 BLDA: 37.1 MM HG (ref 35–45)
PCO2 BLDA: 44.7 MM HG (ref 35–45)
PCO2 BLDA: 45.9 MM HG (ref 35–45)
PCO2 BLDA: 47.6 MM HG (ref 35–45)
PCO2 TEMP ADJ BLD: 37.1 MM HG (ref 35–48)
PCO2 TEMP ADJ BLD: 44.7 MM HG (ref 35–48)
PCO2 TEMP ADJ BLD: 45.9 MM HG (ref 35–48)
PCO2 TEMP ADJ BLD: 47.6 MM HG (ref 35–48)
PEEP RESPIRATORY: 5 CM[H2O]
PH BLDA: 7.47 PH UNITS (ref 7.35–7.45)
PH BLDA: 7.54 PH UNITS (ref 7.35–7.45)
PH, TEMP CORRECTED: 7.47 PH UNITS
PH, TEMP CORRECTED: 7.54 PH UNITS
PHOSPHATE SERPL-MCNC: 2 MG/DL (ref 2.5–4.5)
PLATELET # BLD AUTO: 163 10*3/MM3 (ref 140–450)
PLATELET # BLD AUTO: 190 10*3/MM3 (ref 140–450)
PLATELET # BLD AUTO: 208 10*3/MM3 (ref 140–450)
PLATELET # BLD AUTO: 210 10*3/MM3 (ref 140–450)
PMV BLD AUTO: 10.3 FL (ref 6–12)
PMV BLD AUTO: 10.5 FL (ref 6–12)
PMV BLD AUTO: 9.7 FL (ref 6–12)
PMV BLD AUTO: 9.9 FL (ref 6–12)
PO2 BLDA: 193 MM HG (ref 83–108)
PO2 BLDA: 199 MM HG (ref 83–108)
PO2 BLDA: 310 MM HG (ref 83–108)
PO2 BLDA: 57.6 MM HG (ref 83–108)
PO2 TEMP ADJ BLD: 193 MM HG (ref 83–108)
PO2 TEMP ADJ BLD: 199 MM HG (ref 83–108)
PO2 TEMP ADJ BLD: 310 MM HG (ref 83–108)
PO2 TEMP ADJ BLD: 57.6 MM HG (ref 83–108)
POTASSIUM SERPL-SCNC: 3.1 MMOL/L (ref 3.5–5.2)
POTASSIUM SERPL-SCNC: 3.2 MMOL/L (ref 3.5–5.2)
POTASSIUM SERPL-SCNC: 3.4 MMOL/L (ref 3.5–5.2)
PROT SERPL-MCNC: 5.8 G/DL (ref 6–8.5)
PROT SERPL-MCNC: 6.4 G/DL (ref 6–8.5)
PROTHROMBIN TIME: 15 SECONDS (ref 11.4–14.4)
PROTHROMBIN TIME: 15.7 SECONDS (ref 11.4–14.4)
QT INTERVAL: 290 MS
QTC INTERVAL: 461 MS
RBC # BLD AUTO: 3.4 10*6/MM3 (ref 4.14–5.8)
RBC # BLD AUTO: 3.41 10*6/MM3 (ref 4.14–5.8)
RBC # BLD AUTO: 3.95 10*6/MM3 (ref 4.14–5.8)
RBC # BLD AUTO: 4.36 10*6/MM3 (ref 4.14–5.8)
RH BLD: POSITIVE
SET MECH RESP RATE: 14
SODIUM SERPL-SCNC: 142 MMOL/L (ref 136–145)
SODIUM SERPL-SCNC: 143 MMOL/L (ref 136–145)
SODIUM SERPL-SCNC: 144 MMOL/L (ref 136–145)
T&S EXPIRATION DATE: NORMAL
TOTAL RATE: 0 BREATHS/MINUTE
VENTILATOR MODE: ABNORMAL
VENTILATOR MODE: ABNORMAL
VT ON VENT VENT: 540 ML
WBC # BLD AUTO: 13.28 10*3/MM3 (ref 3.4–10.8)
WBC # BLD AUTO: 14.02 10*3/MM3 (ref 3.4–10.8)
WBC # BLD AUTO: 15.46 10*3/MM3 (ref 3.4–10.8)
WBC # BLD AUTO: 19.45 10*3/MM3 (ref 3.4–10.8)

## 2021-05-28 PROCEDURE — 86923 COMPATIBILITY TEST ELECTRIC: CPT

## 2021-05-28 PROCEDURE — P9017 PLASMA 1 DONOR FRZ W/IN 8 HR: HCPCS

## 2021-05-28 PROCEDURE — 82330 ASSAY OF CALCIUM: CPT | Performed by: NURSE PRACTITIONER

## 2021-05-28 PROCEDURE — 25010000002 ONDANSETRON PER 1 MG: Performed by: NURSE PRACTITIONER

## 2021-05-28 PROCEDURE — 83050 HGB METHEMOGLOBIN QUAN: CPT

## 2021-05-28 PROCEDURE — 86927 PLASMA FRESH FROZEN: CPT

## 2021-05-28 PROCEDURE — 85730 THROMBOPLASTIN TIME PARTIAL: CPT | Performed by: SURGERY

## 2021-05-28 PROCEDURE — 25010000002 FENTANYL CITRATE (PF) 2500 MCG/50ML SOLUTION: Performed by: SURGERY

## 2021-05-28 PROCEDURE — 85014 HEMATOCRIT: CPT | Performed by: INTERNAL MEDICINE

## 2021-05-28 PROCEDURE — 85652 RBC SED RATE AUTOMATED: CPT | Performed by: SURGERY

## 2021-05-28 PROCEDURE — 25010000002 MAGNESIUM SULFATE 2 GM/50ML SOLUTION: Performed by: INTERNAL MEDICINE

## 2021-05-28 PROCEDURE — 25010000002 MAGNESIUM SULFATE PER 500 MG OF MAGNESIUM: Performed by: SURGERY

## 2021-05-28 PROCEDURE — P9016 RBC LEUKOCYTES REDUCED: HCPCS

## 2021-05-28 PROCEDURE — 94799 UNLISTED PULMONARY SVC/PX: CPT

## 2021-05-28 PROCEDURE — 25010000002 CALCIUM GLUCONATE PER 10 ML: Performed by: SURGERY

## 2021-05-28 PROCEDURE — 25010000002 POTASSIUM CHLORIDE PER 2 MEQ OF POTASSIUM: Performed by: SURGERY

## 2021-05-28 PROCEDURE — 85014 HEMATOCRIT: CPT | Performed by: SURGERY

## 2021-05-28 PROCEDURE — 25010000002 LORAZEPAM PER 2 MG: Performed by: SURGERY

## 2021-05-28 PROCEDURE — 85018 HEMOGLOBIN: CPT | Performed by: SURGERY

## 2021-05-28 PROCEDURE — 85018 HEMOGLOBIN: CPT | Performed by: INTERNAL MEDICINE

## 2021-05-28 PROCEDURE — 25010000002 MORPHINE PER 10 MG: Performed by: NURSE PRACTITIONER

## 2021-05-28 PROCEDURE — 82375 ASSAY CARBOXYHB QUANT: CPT

## 2021-05-28 PROCEDURE — 80053 COMPREHEN METABOLIC PANEL: CPT | Performed by: INTERNAL MEDICINE

## 2021-05-28 PROCEDURE — 94660 CPAP INITIATION&MGMT: CPT

## 2021-05-28 PROCEDURE — 25010000002 ALBUMIN HUMAN 5% PER 50 ML: Performed by: ANESTHESIOLOGY

## 2021-05-28 PROCEDURE — P9035 PLATELET PHERES LEUKOREDUCED: HCPCS

## 2021-05-28 PROCEDURE — 25010000002 LORAZEPAM PER 2 MG: Performed by: INTERNAL MEDICINE

## 2021-05-28 PROCEDURE — 82306 VITAMIN D 25 HYDROXY: CPT | Performed by: SURGERY

## 2021-05-28 PROCEDURE — 008Q0ZZ DIVISION OF VAGUS NERVE, OPEN APPROACH: ICD-10-PCS | Performed by: SURGERY

## 2021-05-28 PROCEDURE — 43640 VAGOTOMY & PYLORUS REPAIR: CPT | Performed by: PHYSICIAN ASSISTANT

## 2021-05-28 PROCEDURE — 36600 WITHDRAWAL OF ARTERIAL BLOOD: CPT

## 2021-05-28 PROCEDURE — 25010000003 POTASSIUM CHLORIDE PER 2 MEQ: Performed by: SURGERY

## 2021-05-28 PROCEDURE — C1889 IMPLANT/INSERT DEVICE, NOC: HCPCS | Performed by: SURGERY

## 2021-05-28 PROCEDURE — 63710000001 INSULIN REGULAR HUMAN PER 5 UNITS: Performed by: INTERNAL MEDICINE

## 2021-05-28 PROCEDURE — 0D9630Z DRAINAGE OF STOMACH WITH DRAINAGE DEVICE, PERCUTANEOUS APPROACH: ICD-10-PCS | Performed by: SURGERY

## 2021-05-28 PROCEDURE — 25010000002 THIAMINE PER 100 MG: Performed by: INTERNAL MEDICINE

## 2021-05-28 PROCEDURE — 0DHA3UZ INSERTION OF FEEDING DEVICE INTO JEJUNUM, PERCUTANEOUS APPROACH: ICD-10-PCS | Performed by: SURGERY

## 2021-05-28 PROCEDURE — 86900 BLOOD TYPING SEROLOGIC ABO: CPT | Performed by: NURSE PRACTITIONER

## 2021-05-28 PROCEDURE — 25010000002 MIDAZOLAM PER 1 MG: Performed by: NURSE PRACTITIONER

## 2021-05-28 PROCEDURE — 88304 TISSUE EXAM BY PATHOLOGIST: CPT | Performed by: SURGERY

## 2021-05-28 PROCEDURE — 43501 SURGICAL REPAIR OF STOMACH: CPT | Performed by: PHYSICIAN ASSISTANT

## 2021-05-28 PROCEDURE — 85610 PROTHROMBIN TIME: CPT | Performed by: INTERNAL MEDICINE

## 2021-05-28 PROCEDURE — 84100 ASSAY OF PHOSPHORUS: CPT | Performed by: INTERNAL MEDICINE

## 2021-05-28 PROCEDURE — 25010000002 FENTANYL 10 MCG/1 ML NS: Performed by: NURSE PRACTITIONER

## 2021-05-28 PROCEDURE — 99291 CRITICAL CARE FIRST HOUR: CPT | Performed by: INTERNAL MEDICINE

## 2021-05-28 PROCEDURE — 44300 OPEN BOWEL TO SKIN: CPT | Performed by: PHYSICIAN ASSISTANT

## 2021-05-28 PROCEDURE — 31500 INSERT EMERGENCY AIRWAY: CPT

## 2021-05-28 PROCEDURE — 71045 X-RAY EXAM CHEST 1 VIEW: CPT

## 2021-05-28 PROCEDURE — 63710000001 INSULIN REGULAR HUMAN PER 5 UNITS: Performed by: SURGERY

## 2021-05-28 PROCEDURE — 85610 PROTHROMBIN TIME: CPT | Performed by: SURGERY

## 2021-05-28 PROCEDURE — 86900 BLOOD TYPING SEROLOGIC ABO: CPT

## 2021-05-28 PROCEDURE — 82607 VITAMIN B-12: CPT | Performed by: SURGERY

## 2021-05-28 PROCEDURE — 83735 ASSAY OF MAGNESIUM: CPT | Performed by: INTERNAL MEDICINE

## 2021-05-28 PROCEDURE — 85027 COMPLETE CBC AUTOMATED: CPT | Performed by: NURSE PRACTITIONER

## 2021-05-28 PROCEDURE — 85384 FIBRINOGEN ACTIVITY: CPT | Performed by: SURGERY

## 2021-05-28 PROCEDURE — 86901 BLOOD TYPING SEROLOGIC RH(D): CPT | Performed by: NURSE PRACTITIONER

## 2021-05-28 PROCEDURE — 85027 COMPLETE CBC AUTOMATED: CPT | Performed by: SURGERY

## 2021-05-28 PROCEDURE — 47600 CHOLECYSTECTOMY: CPT | Performed by: PHYSICIAN ASSISTANT

## 2021-05-28 PROCEDURE — 82962 GLUCOSE BLOOD TEST: CPT

## 2021-05-28 PROCEDURE — 0DQ70ZZ REPAIR STOMACH, PYLORUS, OPEN APPROACH: ICD-10-PCS | Performed by: SURGERY

## 2021-05-28 PROCEDURE — 93010 ELECTROCARDIOGRAM REPORT: CPT | Performed by: INTERNAL MEDICINE

## 2021-05-28 PROCEDURE — 0DQ90ZZ REPAIR DUODENUM, OPEN APPROACH: ICD-10-PCS | Performed by: SURGERY

## 2021-05-28 PROCEDURE — 0FT40ZZ RESECTION OF GALLBLADDER, OPEN APPROACH: ICD-10-PCS | Performed by: SURGERY

## 2021-05-28 PROCEDURE — 86850 RBC ANTIBODY SCREEN: CPT | Performed by: NURSE PRACTITIONER

## 2021-05-28 PROCEDURE — 80053 COMPREHEN METABOLIC PANEL: CPT | Performed by: NURSE PRACTITIONER

## 2021-05-28 PROCEDURE — 85025 COMPLETE CBC W/AUTO DIFF WBC: CPT | Performed by: INTERNAL MEDICINE

## 2021-05-28 PROCEDURE — 25010000003 CEFAZOLIN IN DEXTROSE 2-4 GM/100ML-% SOLUTION: Performed by: NURSE ANESTHETIST, CERTIFIED REGISTERED

## 2021-05-28 PROCEDURE — 0BH17EZ INSERTION OF ENDOTRACHEAL AIRWAY INTO TRACHEA, VIA NATURAL OR ARTIFICIAL OPENING: ICD-10-PCS | Performed by: INTERNAL MEDICINE

## 2021-05-28 PROCEDURE — 82805 BLOOD GASES W/O2 SATURATION: CPT

## 2021-05-28 PROCEDURE — 94002 VENT MGMT INPAT INIT DAY: CPT

## 2021-05-28 PROCEDURE — 85730 THROMBOPLASTIN TIME PARTIAL: CPT | Performed by: INTERNAL MEDICINE

## 2021-05-28 PROCEDURE — 25010000002 FENTANYL CITRATE (PF) 2500 MCG/50ML SOLUTION: Performed by: NURSE PRACTITIONER

## 2021-05-28 PROCEDURE — 25010000002 DEXAMETHASONE PER 1 MG: Performed by: ANESTHESIOLOGY

## 2021-05-28 PROCEDURE — 36430 TRANSFUSION BLD/BLD COMPNT: CPT

## 2021-05-28 PROCEDURE — 93005 ELECTROCARDIOGRAM TRACING: CPT | Performed by: NURSE PRACTITIONER

## 2021-05-28 PROCEDURE — 25010000003 POTASSIUM CHLORIDE PER 2 MEQ

## 2021-05-28 PROCEDURE — P9041 ALBUMIN (HUMAN),5%, 50ML: HCPCS | Performed by: ANESTHESIOLOGY

## 2021-05-28 PROCEDURE — 88302 TISSUE EXAM BY PATHOLOGIST: CPT | Performed by: SURGERY

## 2021-05-28 PROCEDURE — 5A1945Z RESPIRATORY VENTILATION, 24-96 CONSECUTIVE HOURS: ICD-10-PCS | Performed by: INTERNAL MEDICINE

## 2021-05-28 DEVICE — LIGACLIP 10-M/L, 10MM ENDOSCOPIC ROTATING MULTIPLE CLIP APPLIERS
Type: IMPLANTABLE DEVICE | Site: ABDOMEN | Status: FUNCTIONAL
Brand: LIGACLIP

## 2021-05-28 RX ORDER — MIDAZOLAM HYDROCHLORIDE 1 MG/ML
2 INJECTION INTRAMUSCULAR; INTRAVENOUS ONCE
Status: COMPLETED | OUTPATIENT
Start: 2021-05-28 | End: 2021-05-28

## 2021-05-28 RX ORDER — MAGNESIUM SULFATE HEPTAHYDRATE 40 MG/ML
6 INJECTION, SOLUTION INTRAVENOUS AS NEEDED
Status: DISCONTINUED | OUTPATIENT
Start: 2021-05-28 | End: 2021-06-03 | Stop reason: HOSPADM

## 2021-05-28 RX ORDER — CHLORHEXIDINE GLUCONATE 0.12 MG/ML
15 RINSE ORAL EVERY 12 HOURS SCHEDULED
Status: DISCONTINUED | OUTPATIENT
Start: 2021-05-28 | End: 2021-06-03 | Stop reason: HOSPADM

## 2021-05-28 RX ORDER — MORPHINE SULFATE 2 MG/ML
2 INJECTION, SOLUTION INTRAMUSCULAR; INTRAVENOUS EVERY 4 HOURS PRN
Status: DISCONTINUED | OUTPATIENT
Start: 2021-05-28 | End: 2021-05-28

## 2021-05-28 RX ORDER — DEXMEDETOMIDINE HYDROCHLORIDE 4 UG/ML
.2-1.5 INJECTION, SOLUTION INTRAVENOUS
Status: DISCONTINUED | OUTPATIENT
Start: 2021-05-28 | End: 2021-06-03 | Stop reason: HOSPADM

## 2021-05-28 RX ORDER — LORAZEPAM 2 MG/ML
2 INJECTION INTRAMUSCULAR EVERY 6 HOURS PRN
Status: DISCONTINUED | OUTPATIENT
Start: 2021-05-28 | End: 2021-05-31

## 2021-05-28 RX ORDER — EPHEDRINE SULFATE 50 MG/ML
INJECTION, SOLUTION INTRAVENOUS AS NEEDED
Status: DISCONTINUED | OUTPATIENT
Start: 2021-05-28 | End: 2021-05-28 | Stop reason: SURG

## 2021-05-28 RX ORDER — ROCURONIUM BROMIDE 10 MG/ML
INJECTION, SOLUTION INTRAVENOUS AS NEEDED
Status: DISCONTINUED | OUTPATIENT
Start: 2021-05-28 | End: 2021-05-28 | Stop reason: SURG

## 2021-05-28 RX ORDER — NOREPINEPHRINE BIT/0.9 % NACL 8 MG/250ML
.02-.3 INFUSION BOTTLE (ML) INTRAVENOUS
Status: DISCONTINUED | OUTPATIENT
Start: 2021-05-28 | End: 2021-05-31

## 2021-05-28 RX ORDER — DEXAMETHASONE SODIUM PHOSPHATE 4 MG/ML
INJECTION, SOLUTION INTRA-ARTICULAR; INTRALESIONAL; INTRAMUSCULAR; INTRAVENOUS; SOFT TISSUE AS NEEDED
Status: DISCONTINUED | OUTPATIENT
Start: 2021-05-28 | End: 2021-05-28 | Stop reason: SURG

## 2021-05-28 RX ORDER — ALBUMIN, HUMAN INJ 5% 5 %
SOLUTION INTRAVENOUS CONTINUOUS PRN
Status: DISCONTINUED | OUTPATIENT
Start: 2021-05-28 | End: 2021-05-28 | Stop reason: SURG

## 2021-05-28 RX ORDER — ETOMIDATE 2 MG/ML
20 INJECTION INTRAVENOUS ONCE
Status: COMPLETED | OUTPATIENT
Start: 2021-05-28 | End: 2021-05-28

## 2021-05-28 RX ORDER — MAGNESIUM HYDROXIDE 1200 MG/15ML
LIQUID ORAL AS NEEDED
Status: DISCONTINUED | OUTPATIENT
Start: 2021-05-28 | End: 2021-05-28 | Stop reason: HOSPADM

## 2021-05-28 RX ORDER — SODIUM CHLORIDE 9 MG/ML
INJECTION, SOLUTION INTRAVENOUS CONTINUOUS PRN
Status: DISCONTINUED | OUTPATIENT
Start: 2021-05-28 | End: 2021-05-28 | Stop reason: SURG

## 2021-05-28 RX ORDER — ETOMIDATE 2 MG/ML
0.3 INJECTION INTRAVENOUS ONCE
Status: COMPLETED | OUTPATIENT
Start: 2021-05-28 | End: 2021-05-28

## 2021-05-28 RX ORDER — ROCURONIUM BROMIDE 10 MG/ML
80 INJECTION, SOLUTION INTRAVENOUS ONCE
Status: COMPLETED | OUTPATIENT
Start: 2021-05-28 | End: 2021-05-28

## 2021-05-28 RX ORDER — CEFAZOLIN SODIUM 2 G/100ML
INJECTION, SOLUTION INTRAVENOUS AS NEEDED
Status: DISCONTINUED | OUTPATIENT
Start: 2021-05-28 | End: 2021-05-28 | Stop reason: SURG

## 2021-05-28 RX ORDER — BUMETANIDE 0.25 MG/ML
2.5 INJECTION INTRAMUSCULAR; INTRAVENOUS ONCE
Status: COMPLETED | OUTPATIENT
Start: 2021-05-28 | End: 2021-05-28

## 2021-05-28 RX ADMIN — FENTANYL CITRATE 250 MCG/HR: 0.05 INJECTION, SOLUTION INTRAMUSCULAR; INTRAVENOUS at 23:47

## 2021-05-28 RX ADMIN — NOREPINEPHRINE BITARTRATE 0.07 MCG/KG/MIN: 1 INJECTION, SOLUTION, CONCENTRATE INTRAVENOUS at 18:00

## 2021-05-28 RX ADMIN — SODIUM CHLORIDE: 9 INJECTION, SOLUTION INTRAVENOUS at 18:54

## 2021-05-28 RX ADMIN — ALBUMIN HUMAN: 0.05 INJECTION, SOLUTION INTRAVENOUS at 18:03

## 2021-05-28 RX ADMIN — Medication 0.06 MCG/KG/MIN: at 15:50

## 2021-05-28 RX ADMIN — FENTANYL CITRATE 50 MCG/HR: 0.05 INJECTION, SOLUTION INTRAMUSCULAR; INTRAVENOUS at 05:40

## 2021-05-28 RX ADMIN — BUMETANIDE 2.5 MG: 0.25 INJECTION, SOLUTION INTRAMUSCULAR; INTRAVENOUS at 03:58

## 2021-05-28 RX ADMIN — DEXMEDETOMIDINE HYDROCHLORIDE 1.1 MCG/KG/HR: 4 INJECTION, SOLUTION INTRAVENOUS at 09:40

## 2021-05-28 RX ADMIN — PANTOPRAZOLE SODIUM 40 MG: 40 INJECTION, POWDER, FOR SOLUTION INTRAVENOUS at 08:58

## 2021-05-28 RX ADMIN — DEXMEDETOMIDINE HYDROCHLORIDE 0.2 MCG/KG/HR: 4 INJECTION, SOLUTION INTRAVENOUS at 05:35

## 2021-05-28 RX ADMIN — LORAZEPAM 2 MG: 2 INJECTION INTRAMUSCULAR; INTRAVENOUS at 10:19

## 2021-05-28 RX ADMIN — SMOFLIPID 200 ML: 6; 6; 5; 3 INJECTION, EMULSION INTRAVENOUS at 22:02

## 2021-05-28 RX ADMIN — THIAMINE HYDROCHLORIDE 500 MG: 100 INJECTION, SOLUTION INTRAMUSCULAR; INTRAVENOUS at 06:05

## 2021-05-28 RX ADMIN — POTASSIUM CHLORIDE 20 MEQ: 29.8 INJECTION, SOLUTION INTRAVENOUS at 09:26

## 2021-05-28 RX ADMIN — INSULIN HUMAN 3 UNITS: 100 INJECTION, SOLUTION PARENTERAL at 06:05

## 2021-05-28 RX ADMIN — LORAZEPAM 2 MG: 2 INJECTION INTRAMUSCULAR; INTRAVENOUS at 22:14

## 2021-05-28 RX ADMIN — ROCURONIUM BROMIDE 30 MG: 10 INJECTION INTRAVENOUS at 18:55

## 2021-05-28 RX ADMIN — FENTANYL CITRATE 300 MCG/HR: 0.05 INJECTION, SOLUTION INTRAMUSCULAR; INTRAVENOUS at 12:11

## 2021-05-28 RX ADMIN — ETOMIDATE 20 MG: 2 INJECTION, SOLUTION INTRAVENOUS at 05:39

## 2021-05-28 RX ADMIN — DEXMEDETOMIDINE HYDROCHLORIDE 0.2 MCG/KG/HR: 4 INJECTION, SOLUTION INTRAVENOUS at 21:56

## 2021-05-28 RX ADMIN — ETOMIDATE 24.84 MG: 2 INJECTION, SOLUTION INTRAVENOUS at 05:32

## 2021-05-28 RX ADMIN — PANTOPRAZOLE SODIUM 40 MG: 40 INJECTION, POWDER, FOR SOLUTION INTRAVENOUS at 22:24

## 2021-05-28 RX ADMIN — POTASSIUM CHLORIDE 20 MEQ: 29.8 INJECTION, SOLUTION INTRAVENOUS at 23:37

## 2021-05-28 RX ADMIN — ONDANSETRON 4 MG: 2 INJECTION INTRAMUSCULAR; INTRAVENOUS at 03:20

## 2021-05-28 RX ADMIN — EPHEDRINE SULFATE 15 MG: 50 INJECTION, SOLUTION INTRAVENOUS at 17:45

## 2021-05-28 RX ADMIN — DEXMEDETOMIDINE HYDROCHLORIDE 1.5 MCG/KG/HR: 4 INJECTION, SOLUTION INTRAVENOUS at 13:55

## 2021-05-28 RX ADMIN — MIDAZOLAM HYDROCHLORIDE 2 MG: 1 INJECTION, SOLUTION INTRAMUSCULAR; INTRAVENOUS at 05:35

## 2021-05-28 RX ADMIN — INSULIN HUMAN 3 UNITS: 100 INJECTION, SOLUTION PARENTERAL at 23:42

## 2021-05-28 RX ADMIN — CEFAZOLIN SODIUM 2 G: 10 INJECTION, POWDER, FOR SOLUTION INTRAVENOUS at 17:30

## 2021-05-28 RX ADMIN — CHLORHEXIDINE GLUCONATE 15 ML: 1.2 SOLUTION ORAL at 23:42

## 2021-05-28 RX ADMIN — EPHEDRINE SULFATE 15 MG: 50 INJECTION, SOLUTION INTRAVENOUS at 17:46

## 2021-05-28 RX ADMIN — INSULIN HUMAN 5 UNITS: 100 INJECTION, SOLUTION PARENTERAL at 11:50

## 2021-05-28 RX ADMIN — POTASSIUM CHLORIDE 20 MEQ: 29.8 INJECTION, SOLUTION INTRAVENOUS at 11:21

## 2021-05-28 RX ADMIN — METOPROLOL TARTRATE 5 MG: 5 INJECTION INTRAVENOUS at 06:05

## 2021-05-28 RX ADMIN — SODIUM CHLORIDE: 9 INJECTION, SOLUTION INTRAVENOUS at 20:10

## 2021-05-28 RX ADMIN — ROCURONIUM BROMIDE 80 MG: 10 INJECTION INTRAVENOUS at 05:39

## 2021-05-28 RX ADMIN — ROCURONIUM BROMIDE 20 MG: 10 INJECTION INTRAVENOUS at 20:51

## 2021-05-28 RX ADMIN — SODIUM CHLORIDE: 9 INJECTION, SOLUTION INTRAVENOUS at 17:10

## 2021-05-28 RX ADMIN — Medication 1 PATCH: at 08:58

## 2021-05-28 RX ADMIN — ROCURONIUM BROMIDE 50 MG: 10 INJECTION INTRAVENOUS at 17:11

## 2021-05-28 RX ADMIN — POTASSIUM PHOSPHATE, MONOBASIC POTASSIUM PHOSPHATE, DIBASIC: 224; 236 INJECTION, SOLUTION, CONCENTRATE INTRAVENOUS at 22:02

## 2021-05-28 RX ADMIN — MORPHINE SULFATE 2 MG: 2 INJECTION, SOLUTION INTRAMUSCULAR; INTRAVENOUS at 03:20

## 2021-05-28 RX ADMIN — DEXAMETHASONE SODIUM PHOSPHATE 8 MG: 4 INJECTION, SOLUTION INTRA-ARTICULAR; INTRALESIONAL; INTRAMUSCULAR; INTRAVENOUS; SOFT TISSUE at 19:58

## 2021-05-28 RX ADMIN — POTASSIUM CHLORIDE 20 MEQ: 29.8 INJECTION, SOLUTION INTRAVENOUS at 01:36

## 2021-05-28 RX ADMIN — Medication 0.02 MCG/KG/MIN: at 07:16

## 2021-05-28 RX ADMIN — SODIUM CHLORIDE, PRESERVATIVE FREE 10 ML: 5 INJECTION INTRAVENOUS at 08:58

## 2021-05-28 RX ADMIN — MAGNESIUM SULFATE HEPTAHYDRATE 6 G: 2 INJECTION, SOLUTION INTRAVENOUS at 09:26

## 2021-05-28 RX ADMIN — POTASSIUM CHLORIDE 20 MEQ: 29.8 INJECTION, SOLUTION INTRAVENOUS at 10:20

## 2021-05-28 NOTE — ANESTHESIA PROCEDURE NOTES
Arterial Line      Patient reassessed immediately prior to procedure    Patient location during procedure: pre-op  Start time: 5/28/2021 5:10 PM  Stop Time:5/28/2021 5:15 PM       Line placed for hemodynamic monitoring.  Performed By   Anesthesiologist: Robby Andrew MD  Preanesthetic Checklist  Completed: patient identified, IV checked, site marked, risks and benefits discussed, surgical consent, monitors and equipment checked, pre-op evaluation and timeout performed  Arterial Line Prep   Sterile Tech: cap, gloves and sterile barriers  Prep: ChloraPrep  Patient monitoring: blood pressure monitoring, continuous pulse oximetry and EKG  Arterial Line Procedure   Laterality:right  Location:  radial artery  Catheter size: 20 G   Guidance: palpation technique  Number of attempts: 1  Successful placement: yes  Post Assessment   Dressing Type: line sutured, occlusive dressing applied, secured with tape and wrist guard applied.   Complications no  Circ/Move/Sens Assessment: normal and unchanged.   Patient Tolerance: patient tolerated the procedure well with no apparent complications

## 2021-05-28 NOTE — ANESTHESIA PREPROCEDURE EVALUATION
Anesthesia Evaluation     Patient summary reviewed and Nursing notes reviewed   no history of anesthetic complications:  NPO Solid Status: Waived due to emergency  NPO Liquid Status: Waived due to emergency           Airway   Mallampati: II  TM distance: >3 FB  Neck ROM: full  No difficulty expected  Dental - normal exam     Pulmonary - negative pulmonary ROS and normal exam    breath sounds clear to auscultation  Cardiovascular - normal exam    ECG reviewed  Rhythm: regular  Rate: normal    (+) dysrhythmias (MFAT),       Neuro/Psych- negative ROS  GI/Hepatic/Renal/Endo    (+)  PUD (Pyloric gastric ulcer), GI bleeding , hepatitis C, renal disease ARF,     Musculoskeletal     (+) back pain,   Abdominal    Substance History      OB/GYN          Other                        Anesthesia Plan    ASA 3 - emergent     general     intravenous induction     Anesthetic plan, all risks, benefits, and alternatives have been provided, discussed and informed consent has been obtained with: patient.    Plan discussed with CRNA.

## 2021-05-29 LAB
25(OH)D3 SERPL-MCNC: 16 NG/ML (ref 30–100)
ALBUMIN SERPL-MCNC: 2.9 G/DL (ref 3.5–5.2)
ALBUMIN/GLOB SERPL: 1.2 G/DL
ALP SERPL-CCNC: 75 U/L (ref 39–117)
ALT SERPL W P-5'-P-CCNC: 72 U/L (ref 1–41)
ANION GAP SERPL CALCULATED.3IONS-SCNC: 6 MMOL/L (ref 5–15)
ARTERIAL PATENCY WRIST A: ABNORMAL
AST SERPL-CCNC: 59 U/L (ref 1–40)
ATMOSPHERIC PRESS: ABNORMAL MM[HG]
BASE EXCESS BLDA CALC-SCNC: 6.5 MMOL/L (ref 0–2)
BASOPHILS # BLD AUTO: 0.04 10*3/MM3 (ref 0–0.2)
BASOPHILS NFR BLD AUTO: 0.3 % (ref 0–1.5)
BDY SITE: ABNORMAL
BH BB BLOOD EXPIRATION DATE: NORMAL
BH BB BLOOD TYPE BARCODE: 5100
BH BB BLOOD TYPE BARCODE: 6200
BH BB DISPENSE STATUS: NORMAL
BH BB PRODUCT CODE: NORMAL
BH BB UNIT NUMBER: NORMAL
BILIRUB SERPL-MCNC: 0.9 MG/DL (ref 0–1.2)
BODY TEMPERATURE: 37 C
BUN SERPL-MCNC: 17 MG/DL (ref 8–23)
BUN/CREAT SERPL: 20.2 (ref 7–25)
CALCIUM SPEC-SCNC: 8.3 MG/DL (ref 8.6–10.5)
CHLORIDE SERPL-SCNC: 105 MMOL/L (ref 98–107)
CO2 BLDA-SCNC: 33.3 MMOL/L (ref 22–33)
CO2 SERPL-SCNC: 30 MMOL/L (ref 22–29)
COHGB MFR BLD: 1.3 % (ref 0–2)
CREAT SERPL-MCNC: 0.84 MG/DL (ref 0.76–1.27)
CROSSMATCH INTERPRETATION: NORMAL
DEPRECATED RDW RBC AUTO: 54.1 FL (ref 37–54)
EOSINOPHIL # BLD AUTO: 0.03 10*3/MM3 (ref 0–0.4)
EOSINOPHIL NFR BLD AUTO: 0.2 % (ref 0.3–6.2)
ERYTHROCYTE [DISTWIDTH] IN BLOOD BY AUTOMATED COUNT: 17.6 % (ref 12.3–15.4)
GFR SERPL CREATININE-BSD FRML MDRD: 93 ML/MIN/1.73
GLOBULIN UR ELPH-MCNC: 2.4 GM/DL
GLUCOSE BLDC GLUCOMTR-MCNC: 119 MG/DL (ref 70–130)
GLUCOSE BLDC GLUCOMTR-MCNC: 148 MG/DL (ref 70–130)
GLUCOSE BLDC GLUCOMTR-MCNC: 184 MG/DL (ref 70–130)
GLUCOSE BLDC GLUCOMTR-MCNC: 199 MG/DL (ref 70–130)
GLUCOSE SERPL-MCNC: 217 MG/DL (ref 65–99)
HCO3 BLDA-SCNC: 31.8 MMOL/L (ref 20–26)
HCT VFR BLD AUTO: 29.3 % (ref 37.5–51)
HCT VFR BLD AUTO: 32 % (ref 37.5–51)
HCT VFR BLD AUTO: 32.4 % (ref 37.5–51)
HCT VFR BLD AUTO: 34.2 % (ref 37.5–51)
HCT VFR BLD CALC: 32.2 %
HGB BLD-MCNC: 10.1 G/DL (ref 13–17.7)
HGB BLD-MCNC: 10.2 G/DL (ref 13–17.7)
HGB BLD-MCNC: 10.9 G/DL (ref 13–17.7)
HGB BLD-MCNC: 9.3 G/DL (ref 13–17.7)
HGB BLDA-MCNC: 10.5 G/DL (ref 13.5–17.5)
IMM GRANULOCYTES # BLD AUTO: 0.14 10*3/MM3 (ref 0–0.05)
IMM GRANULOCYTES NFR BLD AUTO: 1 % (ref 0–0.5)
INHALED O2 CONCENTRATION: 30 %
INR PPP: 1.27 (ref 0.85–1.16)
LYMPHOCYTES # BLD AUTO: 0.34 10*3/MM3 (ref 0.7–3.1)
LYMPHOCYTES NFR BLD AUTO: 2.4 % (ref 19.6–45.3)
MAGNESIUM SERPL-MCNC: 2 MG/DL (ref 1.6–2.4)
MCH RBC QN AUTO: 28.1 PG (ref 26.6–33)
MCHC RBC AUTO-ENTMCNC: 31.9 G/DL (ref 31.5–35.7)
MCV RBC AUTO: 88.1 FL (ref 79–97)
METHGB BLD QL: 0.4 % (ref 0–1.5)
MODALITY: ABNORMAL
MONOCYTES # BLD AUTO: 0.63 10*3/MM3 (ref 0.1–0.9)
MONOCYTES NFR BLD AUTO: 4.5 % (ref 5–12)
NEUTROPHILS NFR BLD AUTO: 12.85 10*3/MM3 (ref 1.7–7)
NEUTROPHILS NFR BLD AUTO: 91.6 % (ref 42.7–76)
NOTE: ABNORMAL
NRBC BLD AUTO-RTO: 0 /100 WBC (ref 0–0.2)
OXYHGB MFR BLDV: 94.1 % (ref 94–99)
PCO2 BLDA: 48.6 MM HG (ref 35–45)
PCO2 TEMP ADJ BLD: 48.6 MM HG (ref 35–48)
PEEP RESPIRATORY: 5 CM[H2O]
PH BLDA: 7.42 PH UNITS (ref 7.35–7.45)
PH, TEMP CORRECTED: 7.42 PH UNITS
PHOSPHATE SERPL-MCNC: 2.7 MG/DL (ref 2.5–4.5)
PLAT MORPH BLD: NORMAL
PLATELET # BLD AUTO: 161 10*3/MM3 (ref 140–450)
PMV BLD AUTO: 10 FL (ref 6–12)
PO2 BLDA: 74.5 MM HG (ref 83–108)
PO2 TEMP ADJ BLD: 74.5 MM HG (ref 83–108)
POTASSIUM SERPL-SCNC: 4.1 MMOL/L (ref 3.5–5.2)
POTASSIUM SERPL-SCNC: 4.1 MMOL/L (ref 3.5–5.2)
PROT SERPL-MCNC: 5.3 G/DL (ref 6–8.5)
PROTHROMBIN TIME: 15.4 SECONDS (ref 11.4–14.4)
RBC # BLD AUTO: 3.88 10*6/MM3 (ref 4.14–5.8)
RBC MORPH BLD: NORMAL
SET MECH RESP RATE: 12
SODIUM SERPL-SCNC: 141 MMOL/L (ref 136–145)
UNIT  ABO: NORMAL
UNIT  RH: NORMAL
VENTILATOR MODE: ABNORMAL
VIT B12 BLD-MCNC: 955 PG/ML (ref 211–946)
VT ON VENT VENT: 540 ML
WBC # BLD AUTO: 14.03 10*3/MM3 (ref 3.4–10.8)
WBC MORPH BLD: NORMAL

## 2021-05-29 PROCEDURE — 83735 ASSAY OF MAGNESIUM: CPT | Performed by: SURGERY

## 2021-05-29 PROCEDURE — 25010000002 LORAZEPAM PER 2 MG: Performed by: SURGERY

## 2021-05-29 PROCEDURE — 84132 ASSAY OF SERUM POTASSIUM: CPT | Performed by: INTERNAL MEDICINE

## 2021-05-29 PROCEDURE — 85610 PROTHROMBIN TIME: CPT | Performed by: SURGERY

## 2021-05-29 PROCEDURE — 82805 BLOOD GASES W/O2 SATURATION: CPT

## 2021-05-29 PROCEDURE — 63710000001 INSULIN DETEMIR PER 5 UNITS: Performed by: INTERNAL MEDICINE

## 2021-05-29 PROCEDURE — 85014 HEMATOCRIT: CPT | Performed by: SURGERY

## 2021-05-29 PROCEDURE — 85007 BL SMEAR W/DIFF WBC COUNT: CPT | Performed by: SURGERY

## 2021-05-29 PROCEDURE — 25010000002 MAGNESIUM SULFATE PER 500 MG OF MAGNESIUM: Performed by: INTERNAL MEDICINE

## 2021-05-29 PROCEDURE — 99291 CRITICAL CARE FIRST HOUR: CPT | Performed by: INTERNAL MEDICINE

## 2021-05-29 PROCEDURE — 94799 UNLISTED PULMONARY SVC/PX: CPT

## 2021-05-29 PROCEDURE — 25010000002 CALCIUM GLUCONATE PER 10 ML: Performed by: INTERNAL MEDICINE

## 2021-05-29 PROCEDURE — 85018 HEMOGLOBIN: CPT | Performed by: SURGERY

## 2021-05-29 PROCEDURE — 63710000001 INSULIN REGULAR HUMAN PER 5 UNITS: Performed by: SURGERY

## 2021-05-29 PROCEDURE — 84100 ASSAY OF PHOSPHORUS: CPT | Performed by: SURGERY

## 2021-05-29 PROCEDURE — 82962 GLUCOSE BLOOD TEST: CPT

## 2021-05-29 PROCEDURE — 83050 HGB METHEMOGLOBIN QUAN: CPT

## 2021-05-29 PROCEDURE — 94003 VENT MGMT INPAT SUBQ DAY: CPT

## 2021-05-29 PROCEDURE — 85025 COMPLETE CBC W/AUTO DIFF WBC: CPT | Performed by: SURGERY

## 2021-05-29 PROCEDURE — 82375 ASSAY CARBOXYHB QUANT: CPT

## 2021-05-29 PROCEDURE — 25010000002 FENTANYL CITRATE (PF) 2500 MCG/50ML SOLUTION: Performed by: SURGERY

## 2021-05-29 PROCEDURE — 80053 COMPREHEN METABOLIC PANEL: CPT | Performed by: SURGERY

## 2021-05-29 PROCEDURE — 25010000002 POTASSIUM CHLORIDE PER 2 MEQ OF POTASSIUM: Performed by: INTERNAL MEDICINE

## 2021-05-29 PROCEDURE — 25010000003 POTASSIUM CHLORIDE PER 2 MEQ: Performed by: SURGERY

## 2021-05-29 RX ADMIN — PANTOPRAZOLE SODIUM 40 MG: 40 INJECTION, POWDER, FOR SOLUTION INTRAVENOUS at 08:11

## 2021-05-29 RX ADMIN — INSULIN DETEMIR 15 UNITS: 100 INJECTION, SOLUTION SUBCUTANEOUS at 10:09

## 2021-05-29 RX ADMIN — LORAZEPAM 2 MG: 2 INJECTION INTRAMUSCULAR; INTRAVENOUS at 03:48

## 2021-05-29 RX ADMIN — CHLORHEXIDINE GLUCONATE 15 ML: 1.2 SOLUTION ORAL at 08:11

## 2021-05-29 RX ADMIN — INSULIN HUMAN 3 UNITS: 100 INJECTION, SOLUTION PARENTERAL at 11:40

## 2021-05-29 RX ADMIN — DEXMEDETOMIDINE HYDROCHLORIDE 0.6 MCG/KG/HR: 4 INJECTION, SOLUTION INTRAVENOUS at 13:45

## 2021-05-29 RX ADMIN — LORAZEPAM 2 MG: 2 INJECTION INTRAMUSCULAR; INTRAVENOUS at 16:41

## 2021-05-29 RX ADMIN — SMOFLIPID 200 ML: 6; 6; 5; 3 INJECTION, EMULSION INTRAVENOUS at 18:19

## 2021-05-29 RX ADMIN — DEXMEDETOMIDINE HYDROCHLORIDE 0.6 MCG/KG/HR: 4 INJECTION, SOLUTION INTRAVENOUS at 06:39

## 2021-05-29 RX ADMIN — FENTANYL CITRATE 300 MCG/HR: 0.05 INJECTION, SOLUTION INTRAMUSCULAR; INTRAVENOUS at 08:10

## 2021-05-29 RX ADMIN — CHLORHEXIDINE GLUCONATE 15 ML: 1.2 SOLUTION ORAL at 20:28

## 2021-05-29 RX ADMIN — POTASSIUM PHOSPHATE, MONOBASIC POTASSIUM PHOSPHATE, DIBASIC: 224; 236 INJECTION, SOLUTION, CONCENTRATE INTRAVENOUS at 18:18

## 2021-05-29 RX ADMIN — SODIUM CHLORIDE, PRESERVATIVE FREE 10 ML: 5 INJECTION INTRAVENOUS at 08:11

## 2021-05-29 RX ADMIN — FENTANYL CITRATE 300 MCG/HR: 0.05 INJECTION, SOLUTION INTRAMUSCULAR; INTRAVENOUS at 16:06

## 2021-05-29 RX ADMIN — LORAZEPAM 2 MG: 2 INJECTION INTRAMUSCULAR; INTRAVENOUS at 22:15

## 2021-05-29 RX ADMIN — POTASSIUM CHLORIDE 20 MEQ: 29.8 INJECTION, SOLUTION INTRAVENOUS at 00:37

## 2021-05-29 RX ADMIN — INSULIN HUMAN 3 UNITS: 100 INJECTION, SOLUTION PARENTERAL at 06:30

## 2021-05-29 RX ADMIN — Medication 1 PATCH: at 08:12

## 2021-05-29 RX ADMIN — PANTOPRAZOLE SODIUM 40 MG: 40 INJECTION, POWDER, FOR SOLUTION INTRAVENOUS at 20:28

## 2021-05-29 RX ADMIN — DEXMEDETOMIDINE HYDROCHLORIDE 0.8 MCG/KG/HR: 4 INJECTION, SOLUTION INTRAVENOUS at 21:56

## 2021-05-29 NOTE — ANESTHESIA POSTPROCEDURE EVALUATION
Patient: Jaime ROD Connell    Procedure Summary     Date: 05/28/21 Room / Location:  [facility] /  [facility]    Anesthesia Start: 1707 Anesthesia Stop: 2111    Procedures:       LAPAROTOMY EXPLORATORY, VAGOTOMY, PYLOROPLASTY, CHOLECYSTECTOMY (N/A Abdomen)      GASTROSTOMY AND JEJUNOSTOMY FEEDING TUBE INSERTION (N/A Abdomen) Diagnosis:     Surgeons: Yonathan Niño MD Provider: Sumit Andrade MD    Anesthesia Type: general ASA Status: 3 - Emergent          Anesthesia Type: general    Vitals  Vitals Value Taken Time   BP     Temp     Pulse     Resp     SpO2 100 % 05/28/21 2110   Vitals shown include unvalidated device data.        Post Anesthesia Care and Evaluation    Patient location during evaluation: ICU  Patient participation: complete - patient cannot participate  Level of consciousness: obtunded/minimal responses  Pain score: 0  Pain management: adequate  Airway patency: patent  Anesthetic complications: No anesthetic complications  PONV Status: none  Cardiovascular status: acceptable  Respiratory status: acceptable  Hydration status: acceptable

## 2021-05-30 LAB
ALBUMIN SERPL-MCNC: 2.4 G/DL (ref 3.5–5.2)
ALBUMIN/GLOB SERPL: 0.9 G/DL
ALP SERPL-CCNC: 80 U/L (ref 39–117)
ALT SERPL W P-5'-P-CCNC: 66 U/L (ref 1–41)
ANION GAP SERPL CALCULATED.3IONS-SCNC: 6 MMOL/L (ref 5–15)
ARTERIAL PATENCY WRIST A: ABNORMAL
AST SERPL-CCNC: 61 U/L (ref 1–40)
ATMOSPHERIC PRESS: ABNORMAL MM[HG]
BASE EXCESS BLDA CALC-SCNC: 5.6 MMOL/L (ref 0–2)
BASOPHILS # BLD AUTO: 0.03 10*3/MM3 (ref 0–0.2)
BASOPHILS NFR BLD AUTO: 0.2 % (ref 0–1.5)
BDY SITE: ABNORMAL
BILIRUB SERPL-MCNC: 0.6 MG/DL (ref 0–1.2)
BODY TEMPERATURE: 37 C
BUN SERPL-MCNC: 23 MG/DL (ref 8–23)
BUN/CREAT SERPL: 32.9 (ref 7–25)
CALCIUM SPEC-SCNC: 7.9 MG/DL (ref 8.6–10.5)
CHLORIDE SERPL-SCNC: 105 MMOL/L (ref 98–107)
CO2 BLDA-SCNC: 32.6 MMOL/L (ref 22–33)
CO2 SERPL-SCNC: 30 MMOL/L (ref 22–29)
COHGB MFR BLD: 1 % (ref 0–2)
CREAT SERPL-MCNC: 0.7 MG/DL (ref 0.76–1.27)
DEPRECATED RDW RBC AUTO: 56.6 FL (ref 37–54)
EOSINOPHIL # BLD AUTO: 0.08 10*3/MM3 (ref 0–0.4)
EOSINOPHIL NFR BLD AUTO: 0.6 % (ref 0.3–6.2)
EPAP: 0
ERYTHROCYTE [DISTWIDTH] IN BLOOD BY AUTOMATED COUNT: 17.8 % (ref 12.3–15.4)
GFR SERPL CREATININE-BSD FRML MDRD: 115 ML/MIN/1.73
GLOBULIN UR ELPH-MCNC: 2.7 GM/DL
GLUCOSE BLDC GLUCOMTR-MCNC: 100 MG/DL (ref 70–130)
GLUCOSE BLDC GLUCOMTR-MCNC: 116 MG/DL (ref 70–130)
GLUCOSE BLDC GLUCOMTR-MCNC: 138 MG/DL (ref 70–130)
GLUCOSE BLDC GLUCOMTR-MCNC: 145 MG/DL (ref 70–130)
GLUCOSE SERPL-MCNC: 159 MG/DL (ref 65–99)
HCO3 BLDA-SCNC: 31.1 MMOL/L (ref 20–26)
HCT VFR BLD AUTO: 29.8 % (ref 37.5–51)
HCT VFR BLD AUTO: 29.8 % (ref 37.5–51)
HCT VFR BLD AUTO: 32.1 % (ref 37.5–51)
HCT VFR BLD AUTO: 32.4 % (ref 37.5–51)
HCT VFR BLD CALC: 31.2 %
HGB BLD-MCNC: 10 G/DL (ref 13–17.7)
HGB BLD-MCNC: 9.2 G/DL (ref 13–17.7)
HGB BLD-MCNC: 9.2 G/DL (ref 13–17.7)
HGB BLD-MCNC: 9.8 G/DL (ref 13–17.7)
HGB BLDA-MCNC: 10.2 G/DL (ref 13.5–17.5)
IMM GRANULOCYTES # BLD AUTO: 0.14 10*3/MM3 (ref 0–0.05)
IMM GRANULOCYTES NFR BLD AUTO: 1.1 % (ref 0–0.5)
INHALED O2 CONCENTRATION: 30 %
IPAP: 0
LYMPHOCYTES # BLD AUTO: 1.48 10*3/MM3 (ref 0.7–3.1)
LYMPHOCYTES NFR BLD AUTO: 11.3 % (ref 19.6–45.3)
MAGNESIUM SERPL-MCNC: 1.8 MG/DL (ref 1.6–2.4)
MCH RBC QN AUTO: 27.5 PG (ref 26.6–33)
MCHC RBC AUTO-ENTMCNC: 30.9 G/DL (ref 31.5–35.7)
MCV RBC AUTO: 89.2 FL (ref 79–97)
METHGB BLD QL: 0.4 % (ref 0–1.5)
MODALITY: ABNORMAL
MONOCYTES # BLD AUTO: 0.83 10*3/MM3 (ref 0.1–0.9)
MONOCYTES NFR BLD AUTO: 6.3 % (ref 5–12)
NEUTROPHILS NFR BLD AUTO: 10.57 10*3/MM3 (ref 1.7–7)
NEUTROPHILS NFR BLD AUTO: 80.5 % (ref 42.7–76)
NOTE: ABNORMAL
NRBC BLD AUTO-RTO: 0 /100 WBC (ref 0–0.2)
OXYHGB MFR BLDV: 96.2 % (ref 94–99)
PAW @ PEAK INSP FLOW SETTING VENT: 0 CMH2O
PCO2 BLDA: 49 MM HG (ref 35–45)
PCO2 TEMP ADJ BLD: 49 MM HG (ref 35–48)
PEEP RESPIRATORY: 5 CM[H2O]
PH BLDA: 7.41 PH UNITS (ref 7.35–7.45)
PH, TEMP CORRECTED: 7.41 PH UNITS
PHOSPHATE SERPL-MCNC: 1.9 MG/DL (ref 2.5–4.5)
PHOSPHATE SERPL-MCNC: 2.4 MG/DL (ref 2.5–4.5)
PLATELET # BLD AUTO: 187 10*3/MM3 (ref 140–450)
PMV BLD AUTO: 10.9 FL (ref 6–12)
PO2 BLDA: 90.4 MM HG (ref 83–108)
PO2 TEMP ADJ BLD: 90.4 MM HG (ref 83–108)
POTASSIUM SERPL-SCNC: 3.9 MMOL/L (ref 3.5–5.2)
PROCALCITONIN SERPL-MCNC: 0.19 NG/ML (ref 0–0.25)
PROT SERPL-MCNC: 5.1 G/DL (ref 6–8.5)
RBC # BLD AUTO: 3.34 10*6/MM3 (ref 4.14–5.8)
SODIUM SERPL-SCNC: 141 MMOL/L (ref 136–145)
TOTAL RATE: 12 BREATHS/MINUTE
WBC # BLD AUTO: 13.13 10*3/MM3 (ref 3.4–10.8)

## 2021-05-30 PROCEDURE — 82962 GLUCOSE BLOOD TEST: CPT

## 2021-05-30 PROCEDURE — 87186 SC STD MICRODIL/AGAR DIL: CPT | Performed by: INTERNAL MEDICINE

## 2021-05-30 PROCEDURE — 85025 COMPLETE CBC W/AUTO DIFF WBC: CPT | Performed by: INTERNAL MEDICINE

## 2021-05-30 PROCEDURE — 83735 ASSAY OF MAGNESIUM: CPT | Performed by: INTERNAL MEDICINE

## 2021-05-30 PROCEDURE — 85014 HEMATOCRIT: CPT | Performed by: SURGERY

## 2021-05-30 PROCEDURE — 25010000002 LORAZEPAM PER 2 MG: Performed by: SURGERY

## 2021-05-30 PROCEDURE — 99291 CRITICAL CARE FIRST HOUR: CPT | Performed by: INTERNAL MEDICINE

## 2021-05-30 PROCEDURE — 25010000002 FENTANYL CITRATE (PF) 2500 MCG/50ML SOLUTION: Performed by: SURGERY

## 2021-05-30 PROCEDURE — 87070 CULTURE OTHR SPECIMN AEROBIC: CPT | Performed by: INTERNAL MEDICINE

## 2021-05-30 PROCEDURE — 82805 BLOOD GASES W/O2 SATURATION: CPT

## 2021-05-30 PROCEDURE — 94799 UNLISTED PULMONARY SVC/PX: CPT

## 2021-05-30 PROCEDURE — 82375 ASSAY CARBOXYHB QUANT: CPT

## 2021-05-30 PROCEDURE — 85018 HEMOGLOBIN: CPT | Performed by: SURGERY

## 2021-05-30 PROCEDURE — 25010000002 POTASSIUM CHLORIDE PER 2 MEQ OF POTASSIUM: Performed by: INTERNAL MEDICINE

## 2021-05-30 PROCEDURE — 63710000001 INSULIN DETEMIR PER 5 UNITS: Performed by: INTERNAL MEDICINE

## 2021-05-30 PROCEDURE — 25010000002 MAGNESIUM SULFATE PER 500 MG OF MAGNESIUM: Performed by: INTERNAL MEDICINE

## 2021-05-30 PROCEDURE — 87205 SMEAR GRAM STAIN: CPT | Performed by: INTERNAL MEDICINE

## 2021-05-30 PROCEDURE — 87147 CULTURE TYPE IMMUNOLOGIC: CPT | Performed by: INTERNAL MEDICINE

## 2021-05-30 PROCEDURE — 84100 ASSAY OF PHOSPHORUS: CPT | Performed by: INTERNAL MEDICINE

## 2021-05-30 PROCEDURE — 83050 HGB METHEMOGLOBIN QUAN: CPT

## 2021-05-30 PROCEDURE — 94003 VENT MGMT INPAT SUBQ DAY: CPT

## 2021-05-30 PROCEDURE — 84145 PROCALCITONIN (PCT): CPT | Performed by: INTERNAL MEDICINE

## 2021-05-30 PROCEDURE — 80053 COMPREHEN METABOLIC PANEL: CPT | Performed by: INTERNAL MEDICINE

## 2021-05-30 PROCEDURE — 25010000002 CALCIUM GLUCONATE PER 10 ML: Performed by: INTERNAL MEDICINE

## 2021-05-30 RX ADMIN — SMOFLIPID 200 ML: 6; 6; 5; 3 INJECTION, EMULSION INTRAVENOUS at 18:23

## 2021-05-30 RX ADMIN — DEXMEDETOMIDINE HYDROCHLORIDE 0.8 MCG/KG/HR: 4 INJECTION, SOLUTION INTRAVENOUS at 16:22

## 2021-05-30 RX ADMIN — DEXMEDETOMIDINE HYDROCHLORIDE 1.2 MCG/KG/HR: 4 INJECTION, SOLUTION INTRAVENOUS at 21:50

## 2021-05-30 RX ADMIN — FENTANYL CITRATE 300 MCG/HR: 0.05 INJECTION, SOLUTION INTRAMUSCULAR; INTRAVENOUS at 08:46

## 2021-05-30 RX ADMIN — PANTOPRAZOLE SODIUM 40 MG: 40 INJECTION, POWDER, FOR SOLUTION INTRAVENOUS at 20:50

## 2021-05-30 RX ADMIN — POTASSIUM PHOSPHATE, MONOBASIC POTASSIUM PHOSPHATE, DIBASIC: 224; 236 INJECTION, SOLUTION, CONCENTRATE INTRAVENOUS at 18:23

## 2021-05-30 RX ADMIN — INSULIN DETEMIR 15 UNITS: 100 INJECTION, SOLUTION SUBCUTANEOUS at 08:46

## 2021-05-30 RX ADMIN — AZTREONAM 2 G: 2 INJECTION, POWDER, LYOPHILIZED, FOR SOLUTION INTRAMUSCULAR; INTRAVENOUS at 14:13

## 2021-05-30 RX ADMIN — Medication 1 PATCH: at 08:52

## 2021-05-30 RX ADMIN — LORAZEPAM 2 MG: 2 INJECTION INTRAMUSCULAR; INTRAVENOUS at 04:20

## 2021-05-30 RX ADMIN — LORAZEPAM 2 MG: 2 INJECTION INTRAMUSCULAR; INTRAVENOUS at 13:49

## 2021-05-30 RX ADMIN — AZTREONAM 2 G: 2 INJECTION, POWDER, LYOPHILIZED, FOR SOLUTION INTRAMUSCULAR; INTRAVENOUS at 20:50

## 2021-05-30 RX ADMIN — CHLORHEXIDINE GLUCONATE 15 ML: 1.2 SOLUTION ORAL at 20:50

## 2021-05-30 RX ADMIN — FENTANYL CITRATE 300 MCG/HR: 0.05 INJECTION, SOLUTION INTRAMUSCULAR; INTRAVENOUS at 00:55

## 2021-05-30 RX ADMIN — CHLORHEXIDINE GLUCONATE 15 ML: 1.2 SOLUTION ORAL at 08:52

## 2021-05-30 RX ADMIN — DEXMEDETOMIDINE HYDROCHLORIDE 0.8 MCG/KG/HR: 4 INJECTION, SOLUTION INTRAVENOUS at 04:20

## 2021-05-30 RX ADMIN — LORAZEPAM 2 MG: 2 INJECTION INTRAMUSCULAR; INTRAVENOUS at 23:53

## 2021-05-30 RX ADMIN — PANTOPRAZOLE SODIUM 40 MG: 40 INJECTION, POWDER, FOR SOLUTION INTRAVENOUS at 08:52

## 2021-05-30 RX ADMIN — FENTANYL CITRATE 300 MCG/HR: 0.05 INJECTION, SOLUTION INTRAMUSCULAR; INTRAVENOUS at 18:22

## 2021-05-30 RX ADMIN — POTASSIUM PHOSPHATE, MONOBASIC AND POTASSIUM PHOSPHATE, DIBASIC 15 MMOL: 224; 236 INJECTION, SOLUTION, CONCENTRATE INTRAVENOUS at 12:22

## 2021-05-30 RX ADMIN — DEXMEDETOMIDINE HYDROCHLORIDE 0.8 MCG/KG/HR: 4 INJECTION, SOLUTION INTRAVENOUS at 08:52

## 2021-05-31 ENCOUNTER — APPOINTMENT (OUTPATIENT)
Dept: GENERAL RADIOLOGY | Facility: HOSPITAL | Age: 61
End: 2021-05-31

## 2021-05-31 LAB
ALBUMIN SERPL-MCNC: 2.4 G/DL (ref 3.5–5.2)
ALBUMIN/GLOB SERPL: 0.9 G/DL
ALP SERPL-CCNC: 122 U/L (ref 39–117)
ALT SERPL W P-5'-P-CCNC: 98 U/L (ref 1–41)
ANION GAP SERPL CALCULATED.3IONS-SCNC: 10 MMOL/L (ref 5–15)
ARTERIAL PATENCY WRIST A: ABNORMAL
ARTERIAL PATENCY WRIST A: ABNORMAL
AST SERPL-CCNC: 109 U/L (ref 1–40)
ATMOSPHERIC PRESS: ABNORMAL MM[HG]
ATMOSPHERIC PRESS: ABNORMAL MM[HG]
BASE EXCESS BLDA CALC-SCNC: 5.9 MMOL/L (ref 0–2)
BASE EXCESS BLDA CALC-SCNC: 6.2 MMOL/L (ref 0–2)
BASOPHILS # BLD AUTO: 0.04 10*3/MM3 (ref 0–0.2)
BASOPHILS NFR BLD AUTO: 0.4 % (ref 0–1.5)
BDY SITE: ABNORMAL
BDY SITE: ABNORMAL
BILIRUB SERPL-MCNC: 0.7 MG/DL (ref 0–1.2)
BODY TEMPERATURE: 37 C
BODY TEMPERATURE: 37 C
BUN SERPL-MCNC: 23 MG/DL (ref 8–23)
BUN/CREAT SERPL: 34.8 (ref 7–25)
CALCIUM SPEC-SCNC: 8.3 MG/DL (ref 8.6–10.5)
CHLORIDE SERPL-SCNC: 103 MMOL/L (ref 98–107)
CO2 BLDA-SCNC: 32 MMOL/L (ref 22–33)
CO2 BLDA-SCNC: 32.1 MMOL/L (ref 22–33)
CO2 SERPL-SCNC: 26 MMOL/L (ref 22–29)
COHGB MFR BLD: 0.9 % (ref 0–2)
COHGB MFR BLD: 0.9 % (ref 0–2)
CREAT SERPL-MCNC: 0.66 MG/DL (ref 0.76–1.27)
CRP SERPL-MCNC: 8.21 MG/DL (ref 0–0.5)
DEPRECATED RDW RBC AUTO: 57.1 FL (ref 37–54)
EOSINOPHIL # BLD AUTO: 0.23 10*3/MM3 (ref 0–0.4)
EOSINOPHIL NFR BLD AUTO: 2.5 % (ref 0.3–6.2)
EPAP: 0
EPAP: 0
ERYTHROCYTE [DISTWIDTH] IN BLOOD BY AUTOMATED COUNT: 17.5 % (ref 12.3–15.4)
GFR SERPL CREATININE-BSD FRML MDRD: 123 ML/MIN/1.73
GLOBULIN UR ELPH-MCNC: 2.7 GM/DL
GLUCOSE BLDC GLUCOMTR-MCNC: 127 MG/DL (ref 70–130)
GLUCOSE BLDC GLUCOMTR-MCNC: 138 MG/DL (ref 70–130)
GLUCOSE BLDC GLUCOMTR-MCNC: 81 MG/DL (ref 70–130)
GLUCOSE BLDC GLUCOMTR-MCNC: 97 MG/DL (ref 70–130)
GLUCOSE SERPL-MCNC: 143 MG/DL (ref 65–99)
HCO3 BLDA-SCNC: 30.7 MMOL/L (ref 20–26)
HCO3 BLDA-SCNC: 30.8 MMOL/L (ref 20–26)
HCT VFR BLD AUTO: 33.8 % (ref 37.5–51)
HCT VFR BLD AUTO: 34.4 % (ref 37.5–51)
HCT VFR BLD AUTO: 37.7 % (ref 37.5–51)
HCT VFR BLD CALC: 32.8 %
HCT VFR BLD CALC: 33.5 %
HGB BLD-MCNC: 10.4 G/DL (ref 13–17.7)
HGB BLD-MCNC: 10.5 G/DL (ref 13–17.7)
HGB BLD-MCNC: 11.9 G/DL (ref 13–17.7)
HGB BLDA-MCNC: 10.7 G/DL (ref 13.5–17.5)
HGB BLDA-MCNC: 10.9 G/DL (ref 13.5–17.5)
IMM GRANULOCYTES # BLD AUTO: 0.13 10*3/MM3 (ref 0–0.05)
IMM GRANULOCYTES NFR BLD AUTO: 1.4 % (ref 0–0.5)
INHALED O2 CONCENTRATION: 30 %
INHALED O2 CONCENTRATION: 30 %
IPAP: 0
IPAP: 0
LYMPHOCYTES # BLD AUTO: 1.42 10*3/MM3 (ref 0.7–3.1)
LYMPHOCYTES NFR BLD AUTO: 15.2 % (ref 19.6–45.3)
MAGNESIUM SERPL-MCNC: 1.6 MG/DL (ref 1.6–2.4)
MCH RBC QN AUTO: 27.2 PG (ref 26.6–33)
MCHC RBC AUTO-ENTMCNC: 30.2 G/DL (ref 31.5–35.7)
MCV RBC AUTO: 90.1 FL (ref 79–97)
METHGB BLD QL: 0.4 % (ref 0–1.5)
METHGB BLD QL: 0.5 % (ref 0–1.5)
MODALITY: ABNORMAL
MODALITY: ABNORMAL
MONOCYTES # BLD AUTO: 0.73 10*3/MM3 (ref 0.1–0.9)
MONOCYTES NFR BLD AUTO: 7.8 % (ref 5–12)
NEUTROPHILS NFR BLD AUTO: 6.81 10*3/MM3 (ref 1.7–7)
NEUTROPHILS NFR BLD AUTO: 72.7 % (ref 42.7–76)
NOTE: ABNORMAL
NOTE: ABNORMAL
NRBC BLD AUTO-RTO: 0 /100 WBC (ref 0–0.2)
OXYHGB MFR BLDV: 95.5 % (ref 94–99)
OXYHGB MFR BLDV: 96.8 % (ref 94–99)
PAW @ PEAK INSP FLOW SETTING VENT: 0 CMH2O
PAW @ PEAK INSP FLOW SETTING VENT: 0 CMH2O
PCO2 BLDA: 43.1 MM HG (ref 35–45)
PCO2 BLDA: 45.1 MM HG (ref 35–45)
PCO2 TEMP ADJ BLD: 43.1 MM HG (ref 35–48)
PCO2 TEMP ADJ BLD: 45.1 MM HG (ref 35–48)
PH BLDA: 7.44 PH UNITS (ref 7.35–7.45)
PH BLDA: 7.46 PH UNITS (ref 7.35–7.45)
PH, TEMP CORRECTED: 7.44 PH UNITS
PH, TEMP CORRECTED: 7.46 PH UNITS
PHOSPHATE SERPL-MCNC: 2.4 MG/DL (ref 2.5–4.5)
PLAT MORPH BLD: NORMAL
PLATELET # BLD AUTO: 182 10*3/MM3 (ref 140–450)
PMV BLD AUTO: 10.3 FL (ref 6–12)
PO2 BLDA: 79.1 MM HG (ref 83–108)
PO2 BLDA: 97.4 MM HG (ref 83–108)
PO2 TEMP ADJ BLD: 79.1 MM HG (ref 83–108)
PO2 TEMP ADJ BLD: 97.4 MM HG (ref 83–108)
POTASSIUM SERPL-SCNC: 3.8 MMOL/L (ref 3.5–5.2)
PREALB SERPL-MCNC: 8.4 MG/DL (ref 20–40)
PROCALCITONIN SERPL-MCNC: 0.11 NG/ML (ref 0–0.25)
PROT SERPL-MCNC: 5.1 G/DL (ref 6–8.5)
RBC # BLD AUTO: 3.82 10*6/MM3 (ref 4.14–5.8)
RBC MORPH BLD: NORMAL
SODIUM SERPL-SCNC: 139 MMOL/L (ref 136–145)
TOTAL RATE: 0 BREATHS/MINUTE
TOTAL RATE: 0 BREATHS/MINUTE
TRIGL SERPL-MCNC: 65 MG/DL (ref 0–150)
VENTILATOR MODE: ABNORMAL
WBC # BLD AUTO: 9.36 10*3/MM3 (ref 3.4–10.8)
WBC MORPH BLD: NORMAL

## 2021-05-31 PROCEDURE — 25010000003 MAGNESIUM SULFATE 4 GM/100ML SOLUTION: Performed by: INTERNAL MEDICINE

## 2021-05-31 PROCEDURE — 71045 X-RAY EXAM CHEST 1 VIEW: CPT

## 2021-05-31 PROCEDURE — 80053 COMPREHEN METABOLIC PANEL: CPT | Performed by: INTERNAL MEDICINE

## 2021-05-31 PROCEDURE — 94799 UNLISTED PULMONARY SVC/PX: CPT

## 2021-05-31 PROCEDURE — 25010000002 CALCIUM GLUCONATE PER 10 ML: Performed by: INTERNAL MEDICINE

## 2021-05-31 PROCEDURE — 82962 GLUCOSE BLOOD TEST: CPT

## 2021-05-31 PROCEDURE — 94003 VENT MGMT INPAT SUBQ DAY: CPT

## 2021-05-31 PROCEDURE — 85018 HEMOGLOBIN: CPT | Performed by: SURGERY

## 2021-05-31 PROCEDURE — 84134 ASSAY OF PREALBUMIN: CPT | Performed by: INTERNAL MEDICINE

## 2021-05-31 PROCEDURE — 25010000002 HALOPERIDOL LACTATE PER 5 MG: Performed by: SURGERY

## 2021-05-31 PROCEDURE — 84100 ASSAY OF PHOSPHORUS: CPT | Performed by: INTERNAL MEDICINE

## 2021-05-31 PROCEDURE — 86140 C-REACTIVE PROTEIN: CPT | Performed by: INTERNAL MEDICINE

## 2021-05-31 PROCEDURE — 25010000002 HYDROMORPHONE PER 4 MG: Performed by: INTERNAL MEDICINE

## 2021-05-31 PROCEDURE — 83735 ASSAY OF MAGNESIUM: CPT | Performed by: INTERNAL MEDICINE

## 2021-05-31 PROCEDURE — 85018 HEMOGLOBIN: CPT | Performed by: INTERNAL MEDICINE

## 2021-05-31 PROCEDURE — 99291 CRITICAL CARE FIRST HOUR: CPT | Performed by: INTERNAL MEDICINE

## 2021-05-31 PROCEDURE — 82375 ASSAY CARBOXYHB QUANT: CPT

## 2021-05-31 PROCEDURE — 63710000001 INSULIN DETEMIR PER 5 UNITS: Performed by: INTERNAL MEDICINE

## 2021-05-31 PROCEDURE — 85025 COMPLETE CBC W/AUTO DIFF WBC: CPT | Performed by: INTERNAL MEDICINE

## 2021-05-31 PROCEDURE — 84478 ASSAY OF TRIGLYCERIDES: CPT | Performed by: INTERNAL MEDICINE

## 2021-05-31 PROCEDURE — 85007 BL SMEAR W/DIFF WBC COUNT: CPT | Performed by: INTERNAL MEDICINE

## 2021-05-31 PROCEDURE — 83050 HGB METHEMOGLOBIN QUAN: CPT

## 2021-05-31 PROCEDURE — 25010000002 MAGNESIUM SULFATE PER 500 MG OF MAGNESIUM: Performed by: INTERNAL MEDICINE

## 2021-05-31 PROCEDURE — 85014 HEMATOCRIT: CPT | Performed by: INTERNAL MEDICINE

## 2021-05-31 PROCEDURE — 85014 HEMATOCRIT: CPT | Performed by: SURGERY

## 2021-05-31 PROCEDURE — 82805 BLOOD GASES W/O2 SATURATION: CPT

## 2021-05-31 PROCEDURE — 25010000002 FENTANYL CITRATE (PF) 2500 MCG/50ML SOLUTION: Performed by: SURGERY

## 2021-05-31 PROCEDURE — 84145 PROCALCITONIN (PCT): CPT | Performed by: INTERNAL MEDICINE

## 2021-05-31 PROCEDURE — 25010000002 POTASSIUM CHLORIDE PER 2 MEQ OF POTASSIUM: Performed by: INTERNAL MEDICINE

## 2021-05-31 RX ORDER — ACETAMINOPHEN 160 MG/5ML
650 SOLUTION ORAL EVERY 6 HOURS PRN
Status: DISCONTINUED | OUTPATIENT
Start: 2021-05-31 | End: 2021-06-03 | Stop reason: HOSPADM

## 2021-05-31 RX ORDER — MAGNESIUM SULFATE HEPTAHYDRATE 40 MG/ML
4 INJECTION, SOLUTION INTRAVENOUS AS NEEDED
Status: DISCONTINUED | OUTPATIENT
Start: 2021-05-31 | End: 2021-06-03 | Stop reason: HOSPADM

## 2021-05-31 RX ORDER — HYDROMORPHONE HYDROCHLORIDE 1 MG/ML
0.5 INJECTION, SOLUTION INTRAMUSCULAR; INTRAVENOUS; SUBCUTANEOUS
Status: DISCONTINUED | OUTPATIENT
Start: 2021-05-31 | End: 2021-06-03 | Stop reason: HOSPADM

## 2021-05-31 RX ORDER — HYDROMORPHONE HYDROCHLORIDE 1 MG/ML
0.2 INJECTION, SOLUTION INTRAMUSCULAR; INTRAVENOUS; SUBCUTANEOUS ONCE AS NEEDED
Status: COMPLETED | OUTPATIENT
Start: 2021-05-31 | End: 2021-05-31

## 2021-05-31 RX ADMIN — AZTREONAM 2 G: 2 INJECTION, POWDER, LYOPHILIZED, FOR SOLUTION INTRAMUSCULAR; INTRAVENOUS at 20:36

## 2021-05-31 RX ADMIN — POTASSIUM PHOSPHATE, MONOBASIC POTASSIUM PHOSPHATE, DIBASIC: 224; 236 INJECTION, SOLUTION, CONCENTRATE INTRAVENOUS at 18:06

## 2021-05-31 RX ADMIN — HYDROMORPHONE HYDROCHLORIDE 0.2 MG: 1 INJECTION, SOLUTION INTRAMUSCULAR; INTRAVENOUS; SUBCUTANEOUS at 14:35

## 2021-05-31 RX ADMIN — DEXMEDETOMIDINE HYDROCHLORIDE 1.5 MCG/KG/HR: 4 INJECTION, SOLUTION INTRAVENOUS at 10:09

## 2021-05-31 RX ADMIN — AZTREONAM 2 G: 2 INJECTION, POWDER, LYOPHILIZED, FOR SOLUTION INTRAMUSCULAR; INTRAVENOUS at 04:14

## 2021-05-31 RX ADMIN — CHLORHEXIDINE GLUCONATE 15 ML: 1.2 SOLUTION ORAL at 10:10

## 2021-05-31 RX ADMIN — ACETAMINOPHEN ORAL SOLUTION 649.6 MG: 650 SOLUTION ORAL at 18:06

## 2021-05-31 RX ADMIN — AZTREONAM 2 G: 2 INJECTION, POWDER, LYOPHILIZED, FOR SOLUTION INTRAMUSCULAR; INTRAVENOUS at 11:53

## 2021-05-31 RX ADMIN — CHLORHEXIDINE GLUCONATE 15 ML: 1.2 SOLUTION ORAL at 20:35

## 2021-05-31 RX ADMIN — DEXMEDETOMIDINE HYDROCHLORIDE 1.2 MCG/KG/HR: 4 INJECTION, SOLUTION INTRAVENOUS at 02:11

## 2021-05-31 RX ADMIN — DEXMEDETOMIDINE HYDROCHLORIDE 1.2 MCG/KG/HR: 4 INJECTION, SOLUTION INTRAVENOUS at 07:16

## 2021-05-31 RX ADMIN — PANTOPRAZOLE SODIUM 40 MG: 40 INJECTION, POWDER, FOR SOLUTION INTRAVENOUS at 10:09

## 2021-05-31 RX ADMIN — FENTANYL CITRATE 300 MCG/HR: 0.05 INJECTION, SOLUTION INTRAMUSCULAR; INTRAVENOUS at 02:11

## 2021-05-31 RX ADMIN — INSULIN DETEMIR 15 UNITS: 100 INJECTION, SOLUTION SUBCUTANEOUS at 10:10

## 2021-05-31 RX ADMIN — SMOFLIPID 200 ML: 6; 6; 5; 3 INJECTION, EMULSION INTRAVENOUS at 18:07

## 2021-05-31 RX ADMIN — HALOPERIDOL LACTATE 5 MG: 5 INJECTION, SOLUTION INTRAMUSCULAR at 10:10

## 2021-05-31 RX ADMIN — MAGNESIUM SULFATE HEPTAHYDRATE 4 G: 40 INJECTION, SOLUTION INTRAVENOUS at 11:53

## 2021-05-31 RX ADMIN — PANTOPRAZOLE SODIUM 40 MG: 40 INJECTION, POWDER, FOR SOLUTION INTRAVENOUS at 20:35

## 2021-05-31 RX ADMIN — Medication 1 PATCH: at 10:10

## 2021-05-31 RX ADMIN — FENTANYL CITRATE 250 MCG/HR: 0.05 INJECTION, SOLUTION INTRAMUSCULAR; INTRAVENOUS at 10:09

## 2021-06-01 ENCOUNTER — APPOINTMENT (OUTPATIENT)
Dept: CT IMAGING | Facility: HOSPITAL | Age: 61
End: 2021-06-01

## 2021-06-01 LAB
ANION GAP SERPL CALCULATED.3IONS-SCNC: 11 MMOL/L (ref 5–15)
ARTERIAL PATENCY WRIST A: ABNORMAL
ATMOSPHERIC PRESS: ABNORMAL MM[HG]
BASE EXCESS BLDA CALC-SCNC: 8.1 MMOL/L (ref 0–2)
BASOPHILS # BLD AUTO: 0.06 10*3/MM3 (ref 0–0.2)
BASOPHILS NFR BLD AUTO: 0.5 % (ref 0–1.5)
BDY SITE: ABNORMAL
BH BB BLOOD EXPIRATION DATE: NORMAL
BH BB BLOOD TYPE BARCODE: 5100
BH BB DISPENSE STATUS: NORMAL
BH BB PRODUCT CODE: NORMAL
BH BB UNIT NUMBER: NORMAL
BODY TEMPERATURE: 37 C
BUN SERPL-MCNC: 19 MG/DL (ref 8–23)
BUN/CREAT SERPL: 26.4 (ref 7–25)
CALCIUM SPEC-SCNC: 8.2 MG/DL (ref 8.6–10.5)
CHLORIDE SERPL-SCNC: 98 MMOL/L (ref 98–107)
CO2 BLDA-SCNC: 31.3 MMOL/L (ref 22–33)
CO2 SERPL-SCNC: 24 MMOL/L (ref 22–29)
COHGB MFR BLD: 1 % (ref 0–2)
CREAT SERPL-MCNC: 0.72 MG/DL (ref 0.76–1.27)
CROSSMATCH INTERPRETATION: NORMAL
DEPRECATED RDW RBC AUTO: 54.6 FL (ref 37–54)
EOSINOPHIL # BLD AUTO: 0.14 10*3/MM3 (ref 0–0.4)
EOSINOPHIL NFR BLD AUTO: 1.3 % (ref 0.3–6.2)
EPAP: 0
ERYTHROCYTE [DISTWIDTH] IN BLOOD BY AUTOMATED COUNT: 17.2 % (ref 12.3–15.4)
GFR SERPL CREATININE-BSD FRML MDRD: 111 ML/MIN/1.73
GLUCOSE BLDC GLUCOMTR-MCNC: 130 MG/DL (ref 70–130)
GLUCOSE BLDC GLUCOMTR-MCNC: 133 MG/DL (ref 70–130)
GLUCOSE BLDC GLUCOMTR-MCNC: 140 MG/DL (ref 70–130)
GLUCOSE BLDC GLUCOMTR-MCNC: 142 MG/DL (ref 70–130)
GLUCOSE SERPL-MCNC: 121 MG/DL (ref 65–99)
HCO3 BLDA-SCNC: 30.3 MMOL/L (ref 20–26)
HCT VFR BLD AUTO: 41.6 % (ref 37.5–51)
HCT VFR BLD AUTO: 43.4 % (ref 37.5–51)
HCT VFR BLD CALC: 39.3 %
HGB BLD-MCNC: 12.8 G/DL (ref 13–17.7)
HGB BLD-MCNC: 12.9 G/DL (ref 13–17.7)
HGB BLDA-MCNC: 12.8 G/DL (ref 13.5–17.5)
IMM GRANULOCYTES # BLD AUTO: 0.2 10*3/MM3 (ref 0–0.05)
IMM GRANULOCYTES NFR BLD AUTO: 1.8 % (ref 0–0.5)
INHALED O2 CONCENTRATION: 28 %
IPAP: 0
LYMPHOCYTES # BLD AUTO: 1.36 10*3/MM3 (ref 0.7–3.1)
LYMPHOCYTES NFR BLD AUTO: 12.2 % (ref 19.6–45.3)
MAGNESIUM SERPL-MCNC: 2.3 MG/DL (ref 1.6–2.4)
MCH RBC QN AUTO: 27.6 PG (ref 26.6–33)
MCHC RBC AUTO-ENTMCNC: 31 G/DL (ref 31.5–35.7)
MCV RBC AUTO: 89.1 FL (ref 79–97)
METHGB BLD QL: 0.3 % (ref 0–1.5)
MODALITY: ABNORMAL
MONOCYTES # BLD AUTO: 0.7 10*3/MM3 (ref 0.1–0.9)
MONOCYTES NFR BLD AUTO: 6.3 % (ref 5–12)
NEUTROPHILS NFR BLD AUTO: 77.9 % (ref 42.7–76)
NEUTROPHILS NFR BLD AUTO: 8.7 10*3/MM3 (ref 1.7–7)
NOTE: ABNORMAL
NRBC BLD AUTO-RTO: 0 /100 WBC (ref 0–0.2)
OXYHGB MFR BLDV: 92 % (ref 94–99)
PAW @ PEAK INSP FLOW SETTING VENT: 0 CMH2O
PCO2 BLDA: 33.1 MM HG (ref 35–45)
PCO2 TEMP ADJ BLD: 33.1 MM HG (ref 35–48)
PH BLDA: 7.57 PH UNITS (ref 7.35–7.45)
PH, TEMP CORRECTED: 7.57 PH UNITS
PHOSPHATE SERPL-MCNC: 1.8 MG/DL (ref 2.5–4.5)
PHOSPHATE SERPL-MCNC: 2.3 MG/DL (ref 2.5–4.5)
PLAT MORPH BLD: NORMAL
PLATELET # BLD AUTO: 216 10*3/MM3 (ref 140–450)
PMV BLD AUTO: 9.4 FL (ref 6–12)
PO2 BLDA: 57.3 MM HG (ref 83–108)
PO2 TEMP ADJ BLD: 57.3 MM HG (ref 83–108)
POTASSIUM SERPL-SCNC: 3.4 MMOL/L (ref 3.5–5.2)
POTASSIUM SERPL-SCNC: 3.5 MMOL/L (ref 3.5–5.2)
PROCALCITONIN SERPL-MCNC: 0.11 NG/ML (ref 0–0.25)
RBC # BLD AUTO: 4.67 10*6/MM3 (ref 4.14–5.8)
RBC MORPH BLD: NORMAL
SODIUM SERPL-SCNC: 133 MMOL/L (ref 136–145)
TOTAL RATE: 0 BREATHS/MINUTE
UNIT  ABO: NORMAL
UNIT  RH: NORMAL
WBC # BLD AUTO: 11.16 10*3/MM3 (ref 3.4–10.8)
WBC MORPH BLD: NORMAL

## 2021-06-01 PROCEDURE — 0 DIATRIZOATE MEGLUMINE & SODIUM PER 1 ML: Performed by: SURGERY

## 2021-06-01 PROCEDURE — 25010000002 CEFTRIAXONE PER 250 MG: Performed by: INTERNAL MEDICINE

## 2021-06-01 PROCEDURE — 85014 HEMATOCRIT: CPT | Performed by: INTERNAL MEDICINE

## 2021-06-01 PROCEDURE — 83050 HGB METHEMOGLOBIN QUAN: CPT

## 2021-06-01 PROCEDURE — 84100 ASSAY OF PHOSPHORUS: CPT | Performed by: INTERNAL MEDICINE

## 2021-06-01 PROCEDURE — 84132 ASSAY OF SERUM POTASSIUM: CPT | Performed by: INTERNAL MEDICINE

## 2021-06-01 PROCEDURE — 25010000002 IOPAMIDOL 61 % SOLUTION: Performed by: INTERNAL MEDICINE

## 2021-06-01 PROCEDURE — 82962 GLUCOSE BLOOD TEST: CPT

## 2021-06-01 PROCEDURE — 63710000001 INSULIN DETEMIR PER 5 UNITS: Performed by: INTERNAL MEDICINE

## 2021-06-01 PROCEDURE — 83735 ASSAY OF MAGNESIUM: CPT | Performed by: INTERNAL MEDICINE

## 2021-06-01 PROCEDURE — 36600 WITHDRAWAL OF ARTERIAL BLOOD: CPT

## 2021-06-01 PROCEDURE — 25010000002 VANCOMYCIN 10 G RECONSTITUTED SOLUTION

## 2021-06-01 PROCEDURE — 84145 PROCALCITONIN (PCT): CPT | Performed by: INTERNAL MEDICINE

## 2021-06-01 PROCEDURE — 25010000002 POTASSIUM CHLORIDE PER 2 MEQ OF POTASSIUM

## 2021-06-01 PROCEDURE — 99233 SBSQ HOSP IP/OBS HIGH 50: CPT | Performed by: INTERNAL MEDICINE

## 2021-06-01 PROCEDURE — 82805 BLOOD GASES W/O2 SATURATION: CPT

## 2021-06-01 PROCEDURE — 82375 ASSAY CARBOXYHB QUANT: CPT

## 2021-06-01 PROCEDURE — 85025 COMPLETE CBC W/AUTO DIFF WBC: CPT | Performed by: INTERNAL MEDICINE

## 2021-06-01 PROCEDURE — 25010000002 HYDROMORPHONE PER 4 MG: Performed by: INTERNAL MEDICINE

## 2021-06-01 PROCEDURE — 85018 HEMOGLOBIN: CPT | Performed by: INTERNAL MEDICINE

## 2021-06-01 PROCEDURE — 25010000003 POTASSIUM CHLORIDE PER 2 MEQ: Performed by: SURGERY

## 2021-06-01 PROCEDURE — 80048 BASIC METABOLIC PNL TOTAL CA: CPT | Performed by: INTERNAL MEDICINE

## 2021-06-01 PROCEDURE — 74177 CT ABD & PELVIS W/CONTRAST: CPT

## 2021-06-01 PROCEDURE — 25010000002 MAGNESIUM SULFATE PER 500 MG OF MAGNESIUM

## 2021-06-01 PROCEDURE — 25010000002 CALCIUM GLUCONATE PER 10 ML

## 2021-06-01 PROCEDURE — 85007 BL SMEAR W/DIFF WBC COUNT: CPT | Performed by: INTERNAL MEDICINE

## 2021-06-01 RX ADMIN — AZTREONAM 2 G: 2 INJECTION, POWDER, LYOPHILIZED, FOR SOLUTION INTRAMUSCULAR; INTRAVENOUS at 04:56

## 2021-06-01 RX ADMIN — IOPAMIDOL 95 ML: 612 INJECTION, SOLUTION INTRAVENOUS at 11:13

## 2021-06-01 RX ADMIN — CHLORHEXIDINE GLUCONATE 15 ML: 1.2 SOLUTION ORAL at 08:56

## 2021-06-01 RX ADMIN — SODIUM CHLORIDE, PRESERVATIVE FREE 10 ML: 5 INJECTION INTRAVENOUS at 08:56

## 2021-06-01 RX ADMIN — ACETAMINOPHEN ORAL SOLUTION 650 MG: 650 SOLUTION ORAL at 09:24

## 2021-06-01 RX ADMIN — SODIUM CHLORIDE, PRESERVATIVE FREE 10 ML: 5 INJECTION INTRAVENOUS at 21:20

## 2021-06-01 RX ADMIN — SMOFLIPID 200 ML: 6; 6; 5; 3 INJECTION, EMULSION INTRAVENOUS at 18:20

## 2021-06-01 RX ADMIN — POTASSIUM CHLORIDE 20 MEQ: 29.8 INJECTION, SOLUTION INTRAVENOUS at 08:56

## 2021-06-01 RX ADMIN — HYDROMORPHONE HYDROCHLORIDE 0.5 MG: 1 INJECTION, SOLUTION INTRAMUSCULAR; INTRAVENOUS; SUBCUTANEOUS at 12:55

## 2021-06-01 RX ADMIN — POTASSIUM PHOSPHATE, MONOBASIC AND POTASSIUM PHOSPHATE, DIBASIC 15 MMOL: 224; 236 INJECTION, SOLUTION, CONCENTRATE INTRAVENOUS at 07:00

## 2021-06-01 RX ADMIN — Medication 15 ML: at 10:03

## 2021-06-01 RX ADMIN — VANCOMYCIN HYDROCHLORIDE 1250 MG: 10 INJECTION, POWDER, LYOPHILIZED, FOR SOLUTION INTRAVENOUS at 14:03

## 2021-06-01 RX ADMIN — POTASSIUM CHLORIDE 20 MEQ: 29.8 INJECTION, SOLUTION INTRAVENOUS at 21:20

## 2021-06-01 RX ADMIN — POTASSIUM PHOSPHATE, MONOBASIC POTASSIUM PHOSPHATE, DIBASIC: 224; 236 INJECTION, SOLUTION, CONCENTRATE INTRAVENOUS at 18:20

## 2021-06-01 RX ADMIN — POTASSIUM CHLORIDE 20 MEQ: 29.8 INJECTION, SOLUTION INTRAVENOUS at 11:31

## 2021-06-01 RX ADMIN — PANTOPRAZOLE SODIUM 40 MG: 40 INJECTION, POWDER, FOR SOLUTION INTRAVENOUS at 08:57

## 2021-06-01 RX ADMIN — HYDROMORPHONE HYDROCHLORIDE 0.5 MG: 1 INJECTION, SOLUTION INTRAMUSCULAR; INTRAVENOUS; SUBCUTANEOUS at 08:46

## 2021-06-01 RX ADMIN — Medication 1 PATCH: at 08:59

## 2021-06-01 RX ADMIN — PANTOPRAZOLE SODIUM 40 MG: 40 INJECTION, POWDER, FOR SOLUTION INTRAVENOUS at 21:20

## 2021-06-01 RX ADMIN — CHLORHEXIDINE GLUCONATE 15 ML: 1.2 SOLUTION ORAL at 21:20

## 2021-06-01 RX ADMIN — POTASSIUM CHLORIDE 20 MEQ: 29.8 INJECTION, SOLUTION INTRAVENOUS at 18:35

## 2021-06-01 RX ADMIN — INSULIN DETEMIR 15 UNITS: 100 INJECTION, SOLUTION SUBCUTANEOUS at 08:59

## 2021-06-01 RX ADMIN — SODIUM CHLORIDE 1 G: 900 INJECTION INTRAVENOUS at 12:55

## 2021-06-01 RX ADMIN — ACETAMINOPHEN ORAL SOLUTION 650 MG: 650 SOLUTION ORAL at 16:05

## 2021-06-01 NOTE — CASE MANAGEMENT/SOCIAL WORK
Continued Stay Note  [facility]     Patient Name: Jaime Connell  MRN: [id number]  Today's Date: 6/1/2021    Admit Date: 5/22/2021    Discharge Plan     Row Name 06/01/21 0817       Plan    Plan  Will follow.    Plan Comments  Called APS Intake [id number] and was informed web referral was accepted for investigation. Awaiting to hear from APS worker.        Discharge Codes    No documentation.       Expected Discharge Date and Time     Expected Discharge Date Expected Discharge Time    Jun 8, 2021             FRANSICO Espinoza

## 2021-06-01 NOTE — PROGRESS NOTES
Clinical Nutrition   Reason For Visit: MDR, Follow-up protocol, PN    Patient Name: Jaime Connell  YOB: 1960  MRN: [id number]  Date of Encounter: 06/01/21 11:15 EDT  Admission date: 5/22/2021        Nutrition Assessment     Admission Problem List:  Acute respiratory failure  Vent (5/22) --> self-extubated and then re-intubated (5/26) --> self-extubated (5/26) --> re-intubated (5/28)-> extubated (5/31)  Encephalopathy, improving  ALICIA, improved  Hematemesis  GI bleed  S/p EGD (5/23)- Large prepyloric ulcer with clot and proximal anterior wall ulcer with clot. Ovesco clip on Duodenal Ulcer. Massive intra operative bleeding. Clips applied.   S/p IR (5/23)- Coils x 10 deployed to Superior and inferior Pancreaticoduodenal and Gastroduodenal arteries as well as Gastroepiploic artery.  S/p EGD (5/25)- Large ulcer just past pylorus. 3 large prepyloric ulcers. Another ulcer on the lesser curve. Clips applied  S/p exploratory laparotomy (5/28)- truncal vagotomy, oversewing of duodenal ulcer, pyloroplasty, cholecystectomy, gastrostomy and jejunostomy feeding tubes placed    Per surgeon note (6/1)- Hold TF until CT results      PMH: He  has a past medical history of Acute blood loss anemia (7/20/2019), Back pain, Hepatitis C antibody test positive (7/20/2019), Large penetrating prepyloric gastric ulcer. Clip placed 7/20/2019 (CMS/HCC) (7/20/2019), and Smoker (7/20/2019).Tobacco use   PSxH: He  has a past surgical history that includes Back surgery; Esophagogastroduodenoscopy (N/A, 7/20/2019); Esophagogastroduodenoscopy (N/A, 5/23/2021); Esophagogastroduodenoscopy (N/A, 5/25/2021); Exploratory Laparotomy (N/A, 5/28/2021); and gastrostomy feeding tube insertion (N/A, 5/28/2021).       Applicable Nutrition-Related Information:  (5/22) NPO  (5/26) EN started per intensivist- Peptamen Intense VHP at 25 ml/hr and free water at 30 ml q 2 hrs via NGT  (5/27) EN on hold this AM since pt started passing bloody output from  FMS; PN started per intensivist- 180 g dextrose, 75 g amino acids at 75 ml/hr with 200 ml/day smoflipid via PICC  (5/28) PN adjusted per intensivist- 225 g dextrose, 110 g amino acids at 65 ml/hr with 200 ml/day smoflipids  (5/30) trophic EN started per intensivist- Peptamen Intense VHP at 15 ml/hr and free water at 15 ml q 2 hrs via J-tube  (5/31) EN rate increased to 30 ml/hr and free water at 30 ml q 2 hrs. Pt had increased abdominal distention later that night after EN rate was increased to 30 ml/hr, surgeon decreased EN rate back to 15 ml/hr.  (6/1) EN on hold this AM per surgeon until results of CT of abdomen/pelvis       Reported/Observed/Food/Nutrition Related History     Pt extubated yesterday (5/31). RN reports that pt had increasing abdominal distention last night after EN rate was increased to 30 ml/hr, surgeon decreased EN rate back to 15 ml/hr last night. Surgeon holding EN this AM until results of CT of abdomen/pelvis. Replacing K and phos.     OK per intensivist for RD and pharmacist to manage the PN.     Per I&O over the past 24 hrs:  g-tube output= 525 ml  MAURICE drain output= 415 ml  FMS output- 0 ml -- planning to d/c today (6/1)      Anthropometrics   Height: 72 in  Weight: 168 lb per bed scale (5/22)  BMI: 22.9  BMI classification: Normal: 18.5-24.9kg/m2     Date Weight (kg) Weight (lbs) Weight Method   5/26/2021 82.8 kg 182 lb 8.7 oz Bed scale   5/25/2021 80.9 kg 178 lb 5.6 oz Bed scale   5/22/2021 76.5 kg 168 lb 10.4 oz Bed scale   5/22/2021 70.7 kg 155 lb 13.8 oz -   7/22/2019 70.7 kg 155 lb 13.8 oz Bed scale   7/21/2019 70.4 kg 155 lb 3.3 oz Bed scale   7/19/2019 79.4 kg 175 lb 0.7 oz -       Labs reviewed   Labs reviewed: Yes    Results from last 7 days   Lab Units 06/01/21  0345 05/31/21  0405 05/30/21  2146 05/30/21  0917   SODIUM mmol/L 133* 139  --  141   POTASSIUM mmol/L 3.4* 3.8  --  3.9   CHLORIDE mmol/L 98 103  --  105   CO2 mmol/L 24.0 26.0  --  30.0*   BUN mg/dL 19 23  --  23    CREATININE mg/dL 0.72* 0.66*  --  0.70*   GLUCOSE mg/dL 121* 143*  --  159*   CALCIUM mg/dL 8.2* 8.3*  --  7.9*   PHOSPHORUS mg/dL 1.8* 2.4* 2.4* 1.9*   MAGNESIUM mg/dL 2.3 1.6  --  1.8   TRIGLYCERIDES mg/dL  --  65  --   --      Results from last 7 days   Lab Units 06/01/21  0345 05/31/21  0405 05/30/21  0917 05/29/21  0343   WBC 10*3/mm3 11.16* 9.36 13.13* 14.03*   ALBUMIN g/dL  --  2.40* 2.40* 2.90*   PREALBUMIN mg/dL  --  8.4*  --   --    CRP mg/dL  --  8.21*  --   --    TRIGLYCERIDES mg/dL  --  65  --   --      Results from last 7 days   Lab Units 06/01/21  0533 05/31/21  2326 05/31/21  1745 05/31/21  1147 05/31/21  0518 05/30/21  2340   GLUCOSE mg/dL 130 97 81 138* 127 116     Lab Results   Lab Value Date/Time    HGBA1C 5.90 (H) 07/20/2019 0611     Medications reviewed   Medications reviewed: Yes  Pertinent: antibiotic, insulin, protonix  PRN: tylenol, dilaudid      Needs Assessment  (6/1)   Height used: 72 in/183 cm  Weight used: 168 lb/77 kg    Estimated Calories needs: ~1900 kcal/day  20-25 kcal/kg= 2393-1626 kcal    Estimated Protein needs: ~115 g pro/day  1.5 g/kg= 115 g pro      Current Nutrition Prescription   PO: NPO Diet  Adult Central 2-in-1 TPN       EN: Pepteman Intense VHP at 15 ml/hr (goal volume= 300 ml/day) and free water at 15 ml q 2 hr  Route: J-tube  Verified at the bedside: Yes      PN: 225 g dextrose, 110 g amino acids at 65 ml/hr with 200 ml/day smoflipid  Route: PICC  Verified at the bedside: Yes  Provides at goal: 1560 ml, 1605 kcal, 110 g pro      Evaluation of Received Nutrient/Fluid Intake-EN:  1 Day:  207 ml      Evaluation of Received Nutrient/Fluid Intake-PN:  1 Day:   1483 ml, 95% +281 ml smoflipids  1702 kcal, 90%  104 g pro, 90%      Nutrition Diagnosis   5/25, updated 5/28  Problem Inadequate energy intake   Etiology Clinical condition, diet order   Signs/Symptoms Pt requiring mechanical ventilation, massive GI bleed, multiple clips and coils applied, pressor   Status: PN  started (5/27)    Intervention   Intervention: Follow treatment progress, Care plan reviewed   -Will adjust PN order, discussed with pharmacist, who states will order in Epic:  D20%, AA7.5% at 65 ml/hr with 200 ml/day smoflipid via PICC    -Will provide at goal:  1560 ml  1868 kcal  117 g pro  GIR= 1.3 mg/kg/min      -As medically appropriate/once OK with surgeon, then rec restarting current trophic EN order of Peptamen Intense VHP at 15 ml/hr and free water at 15 ml q 2 hrs via j-tube      Goal:   General: Nutrition support treatment  PO: N/A   EN/PN: Adjust PN      Monitoring/Evaluation:   Monitoring/Evaluation: Per protocol, Pertinent labs, EN delivery/tolerance, PN delivery/tolerance, GI status, Symptoms    Radha Gonzalez, MS RD/LD CNSC  Time Spent: 45 minutes

## 2021-06-01 NOTE — PROGRESS NOTES
Pharmacy Parenteral Nutrition Evaluation    Jaime Connell is a  60 y.o. male receiving TPN.     Labs  Results from last 7 days   Lab Units 06/01/21  0345 05/31/21  0405 05/30/21  0917 05/29/21  0343   SODIUM mmol/L 133* 139 141 141   POTASSIUM mmol/L 3.4* 3.8 3.9 4.1   CHLORIDE mmol/L 98 103 105 105   CO2 mmol/L 24.0 26.0 30.0* 30.0*   BUN mg/dL 19 23 23 17   CREATININE mg/dL 0.72* 0.66* 0.70* 0.84   CALCIUM mg/dL 8.2* 8.3* 7.9* 8.3*   BILIRUBIN mg/dL  --  0.7 0.6 0.9   ALK PHOS U/L  --  122* 80 75   ALT (SGPT) U/L  --  98* 66* 72*   AST (SGOT) U/L  --  109* 61* 59*   GLUCOSE mg/dL 121* 143* 159* 217*       Results from last 7 days   Lab Units 06/01/21  0345 05/31/21  0405 05/30/21  2146 05/30/21  0917   MAGNESIUM mg/dL 2.3 1.6  --  1.8   PHOSPHORUS mg/dL 1.8* 2.4* 2.4* 1.9*   PREALBUMIN mg/dL  --  8.4*  --   --        Results from last 7 days   Lab Units 06/01/21  0345 05/31/21  1635 05/31/21  1035 05/31/21  0405 05/30/21  0917   WBC 10*3/mm3 11.16*  --   --  9.36 13.13*   HEMOGLOBIN g/dL 12.9* 11.9* 10.5* 10.4* 9.2*  9.2*   HEMATOCRIT % 41.6 37.7 33.8* 34.4* 29.8*  29.8*   PLATELETS 10*3/mm3 216  --   --  182 187       Triglycerides   Date Value Ref Range Status   05/31/2021 65 0 - 150 mg/dL Final     Comment:     Falsely depressed results may occur on samples drawn from patients receiving N-Acetylcysteine (NAC) or Metamizole.       estimated creatinine clearance is 127.8 mL/min (A) (by C-G formula based on SCr of 0.72 mg/dL (L)).    Intake & Output (last 3 days)         05/29 0701 - 05/30 0700 05/30 0701 - 05/31 0700 05/31 0701 - 06/01 0700 06/01 0701 - 06/02 0700    I.V. (mL/kg) 1098.9 (13.3) 1175.5 (14.2) 514.8 (6.2) 3.6 (0)    Blood        Other 20 118 212 30    NG/GT  213 258 60    IV Piggyback  286 229.2 276.3    TPN 1729.6 1711.5 1765.5 464.9    Total Intake(mL/kg) 2848.5 (34.4) 3504 (42.3) 2979.5 (36) 834.8 (10.1)    Urine (mL/kg/hr) 1120 (0.6) 1910 (1) 3725 (1.9) 275 (0.5)    Emesis/NG output 540 550  525 325    Drains 475 280 415 40    Stool 0 0      Total Output 2135 2740 4665 640    Net +713.5 +764 -1685.5 +194.8            Urine Unmeasured Occurrence    1 x            Dietitian Recommendations  Diet Orders (active) (From admission, onward)       Start     Ordered    06/01/21 0918  Diet, Tube Feeding Tube Feeding Formula: Peptamen Intense VHP (Peptide Based, Very High Protein)  Diet Effective Now      06/01/21 0918 05/22/21 2229  NPO Diet  Diet Effective Now      05/22/21 2229                    Current TPN Regimen Recommendation:  Dextrose 20% / Amino Acid 7.5% at goal rate of 65 mL/hr.  20% Lipid Emulsion 200mL every 24 hours.    Assessment/Plan:    Macronutrients per dietary listed above. Standard electrolytes. Pharmacy will follow.    Garry Dubose, PharmD, BCPS  6/1/2021  14:02 EDT

## 2021-06-01 NOTE — PLAN OF CARE
Goal Outcome Evaluation:     Progress: improving  Outcome Summary: VSS, precedex turned off. Pt stated no pain through the night. Drains remain in place. Will continue to monitor.

## 2021-06-01 NOTE — PROGRESS NOTES
Intensive Care Follow-up     Hospital:  LOS: 10 days   Mr. Jaime Connell, 60 y.o. male is followed for:   Acute upper GI bleed            History of present illness:   60-year-old male who is a chronic smoker with hepatitis C positivity and previous history of peptic ulcer disease presented with acute GI bleed.  Patient underwent endoscopy on May 23 [place] found to have large prepyloric ulcer with clot and proximal anterior wall clot.  Active bleeding after clip was placed on duodenal ulcer another clip was placed blindly and patient was transferred to our interventional radiology.  Active hemorrhage was noted from proximal GDA and coil and Gelfoam embolization was done with control of bleeding.  Patient was extubated after procedure on May 26 but got reintubated on May 28 for respiratory distress.  Surgical team was consulted due to massive bleeding.  Patient was taken to operating room on 828 and underwent exploratory laparotomy with truncal vagotomy and oversewing of duodenal ulcer with pyloroplasty and cholecystectomy.  Jejunostomy feeding tube was placed as well.  Patient subsequently extubated on May 31.      Subjective   Interval History:  Overnight hemoglobin remained stable.  No active bleed noted.  Patient did have very thick sputum apparently yesterday.  Still having low-grade fevers.  Not getting coverage for gram-positive's.  Will change his aztreonam to Rocephin.  Send sputum culture and awaiting for that.  Replace electrolytes.  Will remove Knight catheter.  Will DC fecal management system as patient has not had any stool for past few days.  Patient is somewhat confused and impulsive at times.  He is wanting to eat but I discussed with him extensively about staying n.p.o. for now.  Remains on TPN.  On low-dose tube feeds which she did not tolerate so TF rate was decreased to 15 mll/h per surgical team                   The patient's past medical, surgical and social history were reviewed and updated  in Epic as appropriate.       Objective     Infusions:  Adult Central 2-in-1 TPN, , Last Rate: 65 mL/hr at 05/31/21 1806  Adult Central 2-in-1 TPN,   dexmedetomidine, 0.2-1.5 mcg/kg/hr, Last Rate: Stopped (05/31/21 2145)      Medications:  cefTRIAXone, 1 g, Intravenous, Q24H  chlorhexidine, 15 mL, Mouth/Throat, Q12H  Fat Emul Fish Oil/Plant Based, 200 mL, Intravenous, Q24H  insulin detemir, 15 Units, Subcutaneous, Daily  insulin regular, 0-14 Units, Subcutaneous, Q6H  nicotine, 1 patch, Transdermal, Q24H  pantoprazole, 40 mg, Intravenous, Q12H  sodium chloride, 10 mL, Intravenous, Q12H  vancomycin, 1,250 mg, Intravenous, Q12H        Vital Sign Min/Max for last 24 hours  Temp  Min: 98.4 °F (36.9 °C)  Max: 101.9 °F (38.8 °C)   BP  Min: 121/75  Max: 166/124   Pulse  Min: 58  Max: 93   Resp  Min: 20  Max: 30   SpO2  Min: 91 %  Max: 97 %   Flow (L/min)  Min: 1  Max: 2       Input/Output for last 24 hour shift  05/31 0701 - 06/01 0700  In: 2979.5 [I.V.:514.8]  Out: 4665 [Urine:3725; Drains:415]      Objective   General Appearance: Awake, alert, in no ac juan alberto distress  Head:    Atraumatic, Normocephalic, without obvious abnormality  Lungs:   B/L Breath sounds present with decreased breath sounds on bases, no wheezing heard, no crackles.   Heart: S1 and S2 present, no murmur  Abdomen: Slightly distended, appropriately tender at the surgical site, feeding tube in place without any drainage bowel sounds positive.  Extremities: Atraumatic, no cyanosis or clubbing,  no edema, warm to touch.  Pulses: Positive and symmetric.  Neurologic:  Moving all four extremities. Good strength bilaterally.       Results from last 7 days   Lab Units 06/01/21  0345 05/31/21  1635 05/31/21  1035 05/31/21  0405 05/30/21  0917   WBC 10*3/mm3 11.16*  --   --  9.36 13.13*   HEMOGLOBIN g/dL 12.9* 11.9* 10.5* 10.4* 9.2*  9.2*   PLATELETS 10*3/mm3 216  --   --  182 187     Results from last 7 days   Lab Units 06/01/21  0345 05/31/21  0405  05/30/21  2146 05/30/21  0917   SODIUM mmol/L 133* 139  --  141   POTASSIUM mmol/L 3.4* 3.8  --  3.9   CO2 mmol/L 24.0 26.0  --  30.0*   BUN mg/dL 19 23  --  23   CREATININE mg/dL 0.72* 0.66*  --  0.70*   MAGNESIUM mg/dL 2.3 1.6  --  1.8   PHOSPHORUS mg/dL 1.8* 2.4* 2.4* 1.9*   GLUCOSE mg/dL 121* 143*  --  159*     Estimated Creatinine Clearance: 127.8 mL/min (A) (by C-G formula based on SCr of 0.72 mg/dL (L)).    Results from last 7 days   Lab Units 06/01/21  0348   PH, ARTERIAL pH units 7.570*   PCO2, ARTERIAL mm Hg 33.1*   PO2 ART mm Hg 57.3*       Images:   None new    I reviewed the patient's results and images.     Assessment/Plan   Impression        GIH Duodenal Ulcer    Acute blood loss anemia    Hepatitis C antibody test positive    ALICIA (acute kidney injury) (CMS/HCC)    Hyperglycemia    Coagulopathy (CMS/HCC)    Elevated lactic acid level    Acute respiratory failure    R/O Aspiration pneumonitis (CMS/HCC)    Tobacco abuse    Encephalopathy, metabolic       Plan        1.  Patient presenting with massive bleed from duodenal ulcer requiring clipping followed by a intervention radiology guided embolization followed by surgical oversewing of duodenal ulcers with control of bleeding.  Hemoglobin remained stable.  We will continue to monitor closely.  No active bleed noted currently.  .  Patient extubated yesterday and doing well from respiratory standpoint.  Continue monitoring respiratory status closely.  3.  Somewhat encephalopathic and off Precedex.  Continue to monitor for now.  4.  Continue TPN.  Enteral nutrition management per surgical team.  5.  Patient is running fevers.  Could be intra-abdominal source but apparently was having a lot of respiratory secretions as well.  Not being covered for gram-positive organisms currently.  Will change aztreonam to Rocephin.  Previously has tolerated cephalosporins well.  Will follow cultures and may need vancomycin as well.  In the meantime we will also  discontinue Knight catheter and fecal management system as other sources of infection.  6.  Protonix twice daily to continue.  7.  SCDs for DVT prophylaxis.  8.  Insulin for hyperglycemia management.    Continue close monitoring in ICU as remains at high risk of decline.    Plan of care and goals reviewed with multidisciplinary/antibiotic stewardship team during rounds.   I discussed the patient's findings and my recommendations with patient, family and nursing staff     High level of risk due to:  illness with threat to life or bodily function.    Time spent 35 min (exclusive of procedure time)  including high complexity decision making to assess, manipulate, and support vital organ system failure in this individual who has impairment of one or more vital organ systems such that there is a high probability of imminent or life threatening deterioration in the patient’s condition.      Remy Kirk MD, FCCP  Pulmonary, Critical care and Sleep Medicine

## 2021-06-01 NOTE — PROGRESS NOTES
"Patient Name:  Jaime Connell  YOB: 1960  [id number]    Surgery Progress Note    Date of visit: 6/1/2021    Subjective   Pt with fevers overnight, tmax 101.9. Extubated and on nasal cannula. Abdominal distention with increase of TF, decreased back to trophic. Thick respiratory secretions. Minimal abdominal pain. No BM.         Objective       /74   Pulse 85   Temp 99.4 °F (37.4 °C) (Oral)   Resp 22   Ht 182.9 cm (72.01\")   Wt 82.8 kg (182 lb 8.7 oz)   SpO2 95%   BMI 24.75 kg/m²     Intake/Output Summary (Last 24 hours) at 6/1/2021 1102  Last data filed at 6/1/2021 1017  Gross per 24 hour   Intake 2744.22 ml   Output 4580 ml   Net -1835.78 ml       CV:  Rhythm regular and rate regular  L:  Clear to auscultation bilaterally  Abd:  Bowel sounds hypoactive, soft, tender at mid-portion of midline incision, c/d/i, MAURICE serosanguineous  Ext:  No cyanosis, clubbing, edema    Recent labs and imaging that are back at this time have been reviewed.   WBCs 9.4 -> 11.2  Hgb 11.9 -> 12.9  Cr 0.72       Assessment/Plan     Pt POD#4 ex lap, oversewing of duodenal ulcer x2, pyloroplasty, truncal vagotomy, G tube, J tube, cholecystectomy   Given fevers, will order CT scan to assess for intraabdominal process  Hold TF until CT results  G tube to gravity  Pain control  DVT prlx        Yonathan Niño MD  6/1/2021  11:02 EDT      "

## 2021-06-01 NOTE — PLAN OF CARE
Goal Outcome Evaluation:  Plan of Care Reviewed With: patient, significant other  Progress: improving  Outcome Summary: VSS. TF stopped this morning until CT of abd/pelvis completed. Per Dr. Renay mejia to resume tube feeding and advance as tolerated, tube feed restarted at 1330.Pt had a temp of 101.1 this am, tylenol given and pt remained afebrile remainder of shift. Potassium and phos replaced.

## 2021-06-02 ENCOUNTER — APPOINTMENT (OUTPATIENT)
Dept: GENERAL RADIOLOGY | Facility: HOSPITAL | Age: 61
End: 2021-06-02

## 2021-06-02 LAB
ABO GROUP BLD: NORMAL
ANION GAP SERPL CALCULATED.3IONS-SCNC: 8 MMOL/L (ref 5–15)
ARTERIAL PATENCY WRIST A: POSITIVE
ATMOSPHERIC PRESS: ABNORMAL MM[HG]
BASE EXCESS BLDA CALC-SCNC: 3.2 MMOL/L (ref 0–2)
BASOPHILS # BLD AUTO: 0.05 10*3/MM3 (ref 0–0.2)
BASOPHILS NFR BLD AUTO: 0.4 % (ref 0–1.5)
BDY SITE: ABNORMAL
BLD GP AB SCN SERPL QL: NEGATIVE
BODY TEMPERATURE: 37 C
BUN SERPL-MCNC: 20 MG/DL (ref 8–23)
BUN/CREAT SERPL: 29.9 (ref 7–25)
CALCIUM SPEC-SCNC: 8.5 MG/DL (ref 8.6–10.5)
CHLORIDE SERPL-SCNC: 99 MMOL/L (ref 98–107)
CO2 BLDA-SCNC: 26.6 MMOL/L (ref 22–33)
CO2 SERPL-SCNC: 26 MMOL/L (ref 22–29)
COHGB MFR BLD: 1.2 % (ref 0–2)
CREAT SERPL-MCNC: 0.67 MG/DL (ref 0.76–1.27)
CYTO UR: NORMAL
DEPRECATED RDW RBC AUTO: 53 FL (ref 37–54)
EOSINOPHIL # BLD AUTO: 0.21 10*3/MM3 (ref 0–0.4)
EOSINOPHIL NFR BLD AUTO: 1.7 % (ref 0.3–6.2)
EPAP: 0
ERYTHROCYTE [DISTWIDTH] IN BLOOD BY AUTOMATED COUNT: 17.2 % (ref 12.3–15.4)
GFR SERPL CREATININE-BSD FRML MDRD: 121 ML/MIN/1.73
GLUCOSE BLDC GLUCOMTR-MCNC: 141 MG/DL (ref 70–130)
GLUCOSE BLDC GLUCOMTR-MCNC: 143 MG/DL (ref 70–130)
GLUCOSE BLDC GLUCOMTR-MCNC: 146 MG/DL (ref 70–130)
GLUCOSE BLDC GLUCOMTR-MCNC: 158 MG/DL (ref 70–130)
GLUCOSE SERPL-MCNC: 168 MG/DL (ref 65–99)
HCO3 BLDA-SCNC: 25.6 MMOL/L (ref 20–26)
HCT VFR BLD AUTO: 39.5 % (ref 37.5–51)
HCT VFR BLD AUTO: 40.6 % (ref 37.5–51)
HCT VFR BLD CALC: 38.5 %
HGB BLD-MCNC: 12.6 G/DL (ref 13–17.7)
HGB BLD-MCNC: 13 G/DL (ref 13–17.7)
HGB BLDA-MCNC: 12.6 G/DL (ref 13.5–17.5)
IMM GRANULOCYTES # BLD AUTO: 0.21 10*3/MM3 (ref 0–0.05)
IMM GRANULOCYTES NFR BLD AUTO: 1.7 % (ref 0–0.5)
INHALED O2 CONCENTRATION: 21 %
IPAP: 0
LAB AP CASE REPORT: NORMAL
LAB AP CLINICAL INFORMATION: NORMAL
LYMPHOCYTES # BLD AUTO: 1.31 10*3/MM3 (ref 0.7–3.1)
LYMPHOCYTES NFR BLD AUTO: 10.5 % (ref 19.6–45.3)
MAGNESIUM SERPL-MCNC: 1.7 MG/DL (ref 1.6–2.4)
MCH RBC QN AUTO: 27.4 PG (ref 26.6–33)
MCHC RBC AUTO-ENTMCNC: 32 G/DL (ref 31.5–35.7)
MCV RBC AUTO: 85.7 FL (ref 79–97)
METHGB BLD QL: 0.7 % (ref 0–1.5)
MODALITY: ABNORMAL
MONOCYTES # BLD AUTO: 0.84 10*3/MM3 (ref 0.1–0.9)
MONOCYTES NFR BLD AUTO: 6.7 % (ref 5–12)
NEUTROPHILS NFR BLD AUTO: 79 % (ref 42.7–76)
NEUTROPHILS NFR BLD AUTO: 9.91 10*3/MM3 (ref 1.7–7)
NOTE: ABNORMAL
NRBC BLD AUTO-RTO: 0 /100 WBC (ref 0–0.2)
OXYHGB MFR BLDV: 93.5 % (ref 94–99)
PATH REPORT.FINAL DX SPEC: NORMAL
PATH REPORT.GROSS SPEC: NORMAL
PAW @ PEAK INSP FLOW SETTING VENT: 0 CMH2O
PCO2 BLDA: 31.4 MM HG (ref 35–45)
PCO2 TEMP ADJ BLD: 31.4 MM HG (ref 35–48)
PH BLDA: 7.52 PH UNITS (ref 7.35–7.45)
PH, TEMP CORRECTED: 7.52 PH UNITS
PHOSPHATE SERPL-MCNC: 1.7 MG/DL (ref 2.5–4.5)
PLATELET # BLD AUTO: 273 10*3/MM3 (ref 140–450)
PMV BLD AUTO: 9.8 FL (ref 6–12)
PO2 BLDA: 66.4 MM HG (ref 83–108)
PO2 TEMP ADJ BLD: 66.4 MM HG (ref 83–108)
POTASSIUM SERPL-SCNC: 3.9 MMOL/L (ref 3.5–5.2)
RBC # BLD AUTO: 4.74 10*6/MM3 (ref 4.14–5.8)
RH BLD: POSITIVE
SODIUM SERPL-SCNC: 133 MMOL/L (ref 136–145)
T&S EXPIRATION DATE: NORMAL
TOTAL RATE: 0 BREATHS/MINUTE
WBC # BLD AUTO: 12.53 10*3/MM3 (ref 3.4–10.8)

## 2021-06-02 PROCEDURE — 80048 BASIC METABOLIC PNL TOTAL CA: CPT | Performed by: INTERNAL MEDICINE

## 2021-06-02 PROCEDURE — 84100 ASSAY OF PHOSPHORUS: CPT | Performed by: INTERNAL MEDICINE

## 2021-06-02 PROCEDURE — 25010000002 CALCIUM GLUCONATE PER 10 ML: Performed by: INTERNAL MEDICINE

## 2021-06-02 PROCEDURE — 83050 HGB METHEMOGLOBIN QUAN: CPT

## 2021-06-02 PROCEDURE — 63710000001 INSULIN DETEMIR PER 5 UNITS: Performed by: INTERNAL MEDICINE

## 2021-06-02 PROCEDURE — 85025 COMPLETE CBC W/AUTO DIFF WBC: CPT | Performed by: INTERNAL MEDICINE

## 2021-06-02 PROCEDURE — 25010000002 MAGNESIUM SULFATE PER 500 MG OF MAGNESIUM: Performed by: INTERNAL MEDICINE

## 2021-06-02 PROCEDURE — 94799 UNLISTED PULMONARY SVC/PX: CPT

## 2021-06-02 PROCEDURE — 86901 BLOOD TYPING SEROLOGIC RH(D): CPT | Performed by: INTERNAL MEDICINE

## 2021-06-02 PROCEDURE — 0 DIATRIZOATE MEGLUMINE & SODIUM PER 1 ML: Performed by: INTERNAL MEDICINE

## 2021-06-02 PROCEDURE — 97530 THERAPEUTIC ACTIVITIES: CPT

## 2021-06-02 PROCEDURE — 99232 SBSQ HOSP IP/OBS MODERATE 35: CPT | Performed by: INTERNAL MEDICINE

## 2021-06-02 PROCEDURE — 63710000001 INSULIN REGULAR HUMAN PER 5 UNITS: Performed by: INTERNAL MEDICINE

## 2021-06-02 PROCEDURE — 82805 BLOOD GASES W/O2 SATURATION: CPT

## 2021-06-02 PROCEDURE — 85014 HEMATOCRIT: CPT | Performed by: INTERNAL MEDICINE

## 2021-06-02 PROCEDURE — 25010000003 MAGNESIUM SULFATE 4 GM/100ML SOLUTION: Performed by: INTERNAL MEDICINE

## 2021-06-02 PROCEDURE — 36600 WITHDRAWAL OF ARTERIAL BLOOD: CPT

## 2021-06-02 PROCEDURE — 86850 RBC ANTIBODY SCREEN: CPT | Performed by: INTERNAL MEDICINE

## 2021-06-02 PROCEDURE — 25010000002 HYDROMORPHONE PER 4 MG: Performed by: INTERNAL MEDICINE

## 2021-06-02 PROCEDURE — 25010000002 POTASSIUM CHLORIDE PER 2 MEQ OF POTASSIUM: Performed by: INTERNAL MEDICINE

## 2021-06-02 PROCEDURE — 97162 PT EVAL MOD COMPLEX 30 MIN: CPT

## 2021-06-02 PROCEDURE — 86900 BLOOD TYPING SEROLOGIC ABO: CPT | Performed by: INTERNAL MEDICINE

## 2021-06-02 PROCEDURE — 85018 HEMOGLOBIN: CPT | Performed by: INTERNAL MEDICINE

## 2021-06-02 PROCEDURE — 82375 ASSAY CARBOXYHB QUANT: CPT

## 2021-06-02 PROCEDURE — 25010000002 VANCOMYCIN 10 G RECONSTITUTED SOLUTION

## 2021-06-02 PROCEDURE — 83735 ASSAY OF MAGNESIUM: CPT | Performed by: INTERNAL MEDICINE

## 2021-06-02 PROCEDURE — 74240 X-RAY XM UPR GI TRC 1CNTRST: CPT

## 2021-06-02 PROCEDURE — 82962 GLUCOSE BLOOD TEST: CPT

## 2021-06-02 PROCEDURE — 25010000002 CEFTRIAXONE PER 250 MG: Performed by: INTERNAL MEDICINE

## 2021-06-02 RX ADMIN — DIATRIZOATE MEGLUMINE AND DIATRIZOATE SODIUM 120 ML: 660; 100 LIQUID ORAL; RECTAL at 15:00

## 2021-06-02 RX ADMIN — HYDROMORPHONE HYDROCHLORIDE 0.5 MG: 1 INJECTION, SOLUTION INTRAMUSCULAR; INTRAVENOUS; SUBCUTANEOUS at 00:01

## 2021-06-02 RX ADMIN — HYDROMORPHONE HYDROCHLORIDE 0.5 MG: 1 INJECTION, SOLUTION INTRAMUSCULAR; INTRAVENOUS; SUBCUTANEOUS at 23:13

## 2021-06-02 RX ADMIN — ACETAMINOPHEN ORAL SOLUTION 649.6 MG: 650 SOLUTION ORAL at 04:50

## 2021-06-02 RX ADMIN — INSULIN DETEMIR 15 UNITS: 100 INJECTION, SOLUTION SUBCUTANEOUS at 09:30

## 2021-06-02 RX ADMIN — HYDROCODONE BITARTRATE AND ACETAMINOPHEN 10 ML: 7.5; 325 SOLUTION ORAL at 20:30

## 2021-06-02 RX ADMIN — SODIUM CHLORIDE, PRESERVATIVE FREE 10 ML: 5 INJECTION INTRAVENOUS at 20:31

## 2021-06-02 RX ADMIN — HYDROMORPHONE HYDROCHLORIDE 0.5 MG: 1 INJECTION, SOLUTION INTRAMUSCULAR; INTRAVENOUS; SUBCUTANEOUS at 14:19

## 2021-06-02 RX ADMIN — PANTOPRAZOLE SODIUM 40 MG: 40 INJECTION, POWDER, FOR SOLUTION INTRAVENOUS at 09:30

## 2021-06-02 RX ADMIN — SODIUM CHLORIDE, PRESERVATIVE FREE 10 ML: 5 INJECTION INTRAVENOUS at 12:01

## 2021-06-02 RX ADMIN — PANTOPRAZOLE SODIUM 40 MG: 40 INJECTION, POWDER, FOR SOLUTION INTRAVENOUS at 20:30

## 2021-06-02 RX ADMIN — HYDROMORPHONE HYDROCHLORIDE 0.5 MG: 1 INJECTION, SOLUTION INTRAMUSCULAR; INTRAVENOUS; SUBCUTANEOUS at 02:57

## 2021-06-02 RX ADMIN — HYDROMORPHONE HYDROCHLORIDE 0.5 MG: 1 INJECTION, SOLUTION INTRAMUSCULAR; INTRAVENOUS; SUBCUTANEOUS at 12:01

## 2021-06-02 RX ADMIN — MAGNESIUM SULFATE HEPTAHYDRATE 4 G: 40 INJECTION, SOLUTION INTRAVENOUS at 03:56

## 2021-06-02 RX ADMIN — POTASSIUM PHOSPHATE, MONOBASIC AND POTASSIUM PHOSPHATE, DIBASIC 30 MMOL: 224; 236 INJECTION, SOLUTION, CONCENTRATE INTRAVENOUS at 04:27

## 2021-06-02 RX ADMIN — Medication 1 PATCH: at 09:30

## 2021-06-02 RX ADMIN — SODIUM CHLORIDE 1 G: 900 INJECTION INTRAVENOUS at 14:18

## 2021-06-02 RX ADMIN — INSULIN HUMAN 3 UNITS: 100 INJECTION, SOLUTION PARENTERAL at 18:26

## 2021-06-02 RX ADMIN — POTASSIUM PHOSPHATE, MONOBASIC POTASSIUM PHOSPHATE, DIBASIC: 224; 236 INJECTION, SOLUTION, CONCENTRATE INTRAVENOUS at 18:27

## 2021-06-02 RX ADMIN — CHLORHEXIDINE GLUCONATE 15 ML: 1.2 SOLUTION ORAL at 09:30

## 2021-06-02 RX ADMIN — VANCOMYCIN HYDROCHLORIDE 1250 MG: 10 INJECTION, POWDER, LYOPHILIZED, FOR SOLUTION INTRAVENOUS at 02:25

## 2021-06-02 RX ADMIN — HYDROCODONE BITARTRATE AND ACETAMINOPHEN 10 ML: 7.5; 325 SOLUTION ORAL at 09:34

## 2021-06-02 RX ADMIN — HYDROMORPHONE HYDROCHLORIDE 0.5 MG: 1 INJECTION, SOLUTION INTRAMUSCULAR; INTRAVENOUS; SUBCUTANEOUS at 07:06

## 2021-06-02 NOTE — PROGRESS NOTES
Intensive Care Follow-up     Hospital:  LOS: 11 days   Mr. Jaime Connell, 60 y.o. male is followed for:   Acute upper GI bleed            History of present illness:   60-year-old male who is a chronic smoker with hepatitis C positivity and previous history of peptic ulcer disease presented with acute GI bleed.  Patient underwent endoscopy on May 23 [place] found to have large prepyloric ulcer with clot and proximal anterior wall clot.  Active bleeding after clip was placed on duodenal ulcer another clip was placed blindly and patient was transferred to our interventional radiology.  Active hemorrhage was noted from proximal GDA and coil and G[place] embolization was done with control of bleeding.  Patient was extubated after procedure on May 26 but got reintubated on May 28 for respiratory distress.  Surgical team was consulted due to massive bleeding.  Patient was taken to operating room on 828 and underwent exploratory laparotomy with truncal vagotomy and oversewing of duodenal ulcer with pyloroplasty and cholecystectomy.  Jejunostomy feeding tube was placed as well.  Patient subsequently extubated on May 31.      Subjective   Interval History:  Overnight hemoglobin remained stable.  No active bleed noted.  No new fevers overnight.  Still having thick secretions.  Growing staph species from sputum culture.  On antibiotics.  Little more coherent today.                   The patient's past medical, surgical and social history were reviewed and updated in Epic as appropriate.       Objective     Infusions:  Adult Central 2-in-1 TPN, , Last Rate: 65 mL/hr at 06/01/21 1820  Adult Central 2-in-1 TPN,   dexmedetomidine, 0.2-1.5 mcg/kg/hr, Last Rate: Stopped (05/31/21 2145)      Medications:  cefTRIAXone, 1 g, Intravenous, Q24H  chlorhexidine, 15 mL, Mouth/Throat, Q12H  insulin detemir, 15 Units, Subcutaneous, Daily  insulin regular, 0-14 Units, Subcutaneous, Q6H  nicotine, 1 patch, Transdermal, Q24H  pantoprazole, 40  "mg, Intravenous, Q12H  sodium chloride, 10 mL, Intravenous, Q12H        Vital Sign Min/Max for last 24 hours  Temp  Min: 98.2 °F (36.8 °C)  Max: 99.1 °F (37.3 °C)   BP  Min: 129/76  Max: 162/80   Pulse  Min: 71  Max: 88   Resp  Min: 20  Max: 24   SpO2  Min: 88 %  Max: 98 %   No data recorded       Input/Output for last 24 hour shift  06/01 0701 - 06/02 0700  In: 3201.8 [I.V.:40.1]  Out: 2900 [Urine:1950; Drains:125]      Objective:  Vital signs: (most recent): Blood pressure 135/79, pulse 88, temperature 98.5 °F (36.9 °C), temperature source Axillary, resp. rate 20, height 182.9 cm (72.01\"), weight 82.8 kg (182 lb 8.7 oz), SpO2 94 %.             General Appearance: Awake, alert, in no acute distress, on room air  Lungs:   B/L Breath sounds present with decreased breath sounds on bases, no wheezing heard, no crackles.   Heart: S1 and S2 present, no murmur  Abdomen: Slightly distended, appropriately tender at the surgical site, feeding tube in place without any drainage bowel sounds positive.  Extremities: no edema, warm to touch.  Pulses: Positive and symmetric.  Neurologic:  Moving all four extremities. Good strength bilaterally.       Results from last 7 days   Lab Units 06/02/21 0233 06/01/21  1543 06/01/21  0345 05/31/21  0405   WBC 10*3/mm3 12.53*  --  11.16* 9.36   HEMOGLOBIN g/dL 13.0 12.8* 12.9* 10.4*   PLATELETS 10*3/mm3 273  --  216 182     Results from last 7 days   Lab Units 06/02/21  0233 06/01/21  1543 06/01/21  0345 05/31/21  0405   SODIUM mmol/L 133*  --  133* 139   POTASSIUM mmol/L 3.9 3.5 3.4* 3.8   CO2 mmol/L 26.0  --  24.0 26.0   BUN mg/dL 20  --  19 23   CREATININE mg/dL 0.67*  --  0.72* 0.66*   MAGNESIUM mg/dL 1.7  --  2.3 1.6   PHOSPHORUS mg/dL 1.7* 2.3* 1.8* 2.4*   GLUCOSE mg/dL 168*  --  121* 143*     Estimated Creatinine Clearance: 137.3 mL/min (A) (by C-G formula based on SCr of 0.67 mg/dL (L)).    Results from last 7 days   Lab Units 06/02/21  0426   PH, ARTERIAL pH units 7.520*   PCO2, " ARTERIAL mm Hg 31.4*   PO2 ART mm Hg 66.4*       Images:   CT abdomen reviewed  IMPRESSION:  Postsurgical changes from duodenal ulcer repair with  mesenteric edema and soft tissue drain in place in the epigastric region  with interval development of a 3.1 m low-attenuation area right hepatic  lobe periphery concerning for developing abscess formation given lack of  visualization on prior comparison at this site with interval development  of this region. No intraabdominal or intrapelvic abscess is otherwise  noted with trace volume low density fluid scattered throughout and  mesenteric edema.     D:  06/01/2021  E:  06/01/2021        This report was finalized on 6/1/2021 7:00 PM by Dr. Sylvester Quick.    I reviewed the patient's results and images.     Assessment/Plan   Impression        GIH Duodenal Ulcer    Acute blood loss anemia    Hepatitis C antibody test positive    ALICIA (acute kidney injury) (CMS/HCC)    Hyperglycemia    Coagulopathy (CMS/HCC)    Elevated lactic acid level    Acute respiratory failure    R/O Aspiration pneumonitis (CMS/HCC)    Tobacco abuse    Encephalopathy, metabolic       Plan        1.  Patient presenting with massive bleed from duodenal ulcer requiring clipping followed by a intervention radiology guided embolization followed by surgical oversewing of duodenal ulcers with control of bleeding.  Hemoglobin remained stable.  We will continue to monitor closely.  No active bleed noted currently.  2.  Patient doing well from respiratory standpoint and on room air currently.  3.  Encephalopathy is improving.  Patient is more coherent.  Off Precedex.  4.  Continue TPN.  Enteral nutrition management per surgical team.  5.  Sputum culture grew staph aureus heavy growth.  On vancomycin and Zosyn.  Once identification is available will adjust antibiotics further.  The meantime hemodynamically stable and no new fevers at this time.  There was also concern for possible liver abscess on the CT scan.   Discussed with surgical team and they are aware but thinking may be mild ischemia rather than abscess.  Will monitor for now and if continues to have fever may need drainage.  6.  Protonix twice daily to continue.  7.  SCDs for DVT prophylaxis.  8.  Insulin for hyperglycemia management.    Patient has shown stability and likely can be transferred out of ICU if okay with surgical team.    Plan of care and goals reviewed with multidisciplinary/antibiotic stewardship team during rounds.   I discussed the patient's findings and my recommendations with patient, family and nursing staff     High level of risk due to:  illness with threat to life or bodily function.    Time spent 30 min (exclusive of procedure time)  including high complexity decision making to assess, manipulate, and support vital organ system failure in this individual who has impairment of one or more vital organ systems such that there is a high probability of imminent or life threatening deterioration in the patient’s condition.      Remy Kirk MD, [facility]  Pulmonary, Critical care and Sleep Medicine

## 2021-06-02 NOTE — PLAN OF CARE
Goal Outcome Evaluation:  VSS, patient was able to get out of bed today with assist x1, one large dark red bowel movement. Patient is able to walk with assist x1, physical and occupational therapy was consulted, pt saw patient and evaluated. Patient was cleared by surgeon for ice chips. Urine output >1000 ml, patient was transferred to the floor.

## 2021-06-02 NOTE — PROGRESS NOTES
Pharmacy Parenteral Nutrition Evaluation    Jaime Connell is a  60 y.o. male receiving TPN.     Labs  Results from last 7 days   Lab Units 06/02/21 0233 06/01/21  1543 06/01/21 0345 05/31/21 0405 05/30/21  0917 05/29/21  0343   SODIUM mmol/L 133*  --  133* 139 141 141   POTASSIUM mmol/L 3.9 3.5 3.4* 3.8 3.9 4.1   CHLORIDE mmol/L 99  --  98 103 105 105   CO2 mmol/L 26.0  --  24.0 26.0 30.0* 30.0*   BUN mg/dL 20  --  19 23 23 17   CREATININE mg/dL 0.67*  --  0.72* 0.66* 0.70* 0.84   CALCIUM mg/dL 8.5*  --  8.2* 8.3* 7.9* 8.3*   BILIRUBIN mg/dL  --   --   --  0.7 0.6 0.9   ALK PHOS U/L  --   --   --  122* 80 75   ALT (SGPT) U/L  --   --   --  98* 66* 72*   AST (SGOT) U/L  --   --   --  109* 61* 59*   GLUCOSE mg/dL 168*  --  121* 143* 159* 217*       Results from last 7 days   Lab Units 06/02/21 0233 06/01/21  1543 06/01/21  0345 05/31/21  0405   MAGNESIUM mg/dL 1.7  --  2.3 1.6   PHOSPHORUS mg/dL 1.7* 2.3* 1.8* 2.4*   PREALBUMIN mg/dL  --   --   --  8.4*       Results from last 7 days   Lab Units 06/02/21 0233 06/01/21  1543 06/01/21  0345 05/31/21  0405   WBC 10*3/mm3 12.53*  --  11.16* 9.36   HEMOGLOBIN g/dL 13.0 12.8* 12.9* 10.4*   HEMATOCRIT % 40.6 43.4 41.6 34.4*   PLATELETS 10*3/mm3 273  --  216 182       Triglycerides   Date Value Ref Range Status   05/31/2021 65 0 - 150 mg/dL Final     Comment:     Falsely depressed results may occur on samples drawn from patients receiving N-Acetylcysteine (NAC) or Metamizole.       estimated creatinine clearance is 137.3 mL/min (A) (by C-G formula based on SCr of 0.67 mg/dL (L)).    Intake & Output (last 3 days)         05/30 0701 - 05/31 0700 05/31 0701 - 06/01 0700 06/01 0701 - 06/02 0700 06/02 0701 - 06/03 0700    I.V. (mL/kg) 1175.5 (14.2) 514.8 (6.2) 40.1 (0.5) 98.1 (1.2)    Other 118 212 105 30    NG/ 258 333 128    IV Piggyback 286 229.2 961.3 502.4    TPN 1711.5 1765.5 1762.3 532.6    Total Intake(mL/kg) 3504 (42.3) 2979.5 (36) 3201.8 (38.7) 1291.2 (15.6)     Urine (mL/kg/hr) 1910 (1) 3725 (1.9) 1950 (1) 1000 (1.8)    Emesis/NG output 550 525 800 100    Drains 280 415 125 20    Stool 0  25     Total Output 2740 4665 2900 1120    Net +764 -1685.5 +301.8 +171.2            Urine Unmeasured Occurrence   3 x 2 x    Stool Unmeasured Occurrence   3 x             Dietitian Recommendations  Diet Orders (active) (From admission, onward)       Start     Ordered    06/01/21 0918  Diet, Tube Feeding Tube Feeding Formula: Peptamen Intense VHP (Peptide Based, Very High Protein)  Diet Effective Now      06/01/21 0918    05/22/21 2229  NPO Diet  Diet Effective Now      05/22/21 2229                    Current TPN Regimen Recommendation:  Dextrose 20% / Amino Acid 7.5% at goal rate of 65 mL/hr.   Assessment/Plan:    Macronutrients per dietary listed above. Will continue standard electrolytes. Replacement protocols are available. TF to reach goal today and likely to wean TPN off tomorrow. DC SMOF today. Pharmacy will follow.    Telly De LeonD, BCPS  6/2/2021  13:33 EDT

## 2021-06-02 NOTE — CASE MANAGEMENT/SOCIAL WORK
Continued Stay Note  [facility]     Patient Name: Jaime Connell  MRN: [id number]  Today's Date: 2021    Admit Date: 2021    Discharge Plan     Row Name 21 1143       Plan    Plan  Ongoing    Patient/Family in Agreement with Plan  yes    Plan Comments  Spoke with patient at bedside about possible rehab.  PT/OT evals are pending.  Per patients RN patient did well getting up in chair.  Patient isn't sure he would want to do rehab.  Also discussed with patient next of kin.  Patient states he has a biological son and he helped raise two daughters, of which one is .  Patient states he is estranged from all.  Offered to reach out to son if he wanted.  Patient then started talking about visiting his parents graves when he is discharged then mentioned he had a sister.  Offered to reach out to sister.  Patient states her name is Daly Mercedes and her phone number is [phone].  This information given to SW who will speak to patient and reach out to see if this is the patients sister.  CM will continue to follow for discharge needs.        Discharge Codes    No documentation.       Expected Discharge Date and Time     Expected Discharge Date Expected Discharge Time    2021             Rebecca Borja RN

## 2021-06-02 NOTE — PROGRESS NOTES
"Patient Name:  Jaime Connell  YOB: 1960  [id number]    Surgery Progress Note    Date of visit: 6/2/2021    Subjective   No acute events overnight. Passed some BMs with old blood but Hgb/hemodynamics stable. Abdominal pain improved this AM. No nausea/vomiting. Tmax 99.1.         Objective       /81   Pulse 87   Temp 99.1 °F (37.3 °C) (Oral)   Resp 22   Ht 182.9 cm (72.01\")   Wt 82.8 kg (182 lb 8.7 oz)   SpO2 92%   BMI 24.75 kg/m²     Intake/Output Summary (Last 24 hours) at 6/2/2021 1154  Last data filed at 6/2/2021 0427  Gross per 24 hour   Intake 2436.75 ml   Output 2260 ml   Net 176.75 ml       CV:  Rhythm regular and rate regular  L:  Clear to auscultation bilaterally  Abd:  Bowel sounds positive, soft  Ext:  No cyanosis, clubbing, edema    Recent labs and imaging that are back at this time have been reviewed.   WBCs 11.2 -> 12.5  Hgb 12.8 -> 13  Cr 0.67  CT:  IMPRESSION:  Postsurgical changes from duodenal ulcer repair with  mesenteric edema and soft tissue drain in place in the epigastric region  with interval development of a 3.1 m low-attenuation area right hepatic  lobe periphery concerning for developing abscess formation given lack of  visualization on prior comparison at this site with interval development  of this region. No intraabdominal or intrapelvic abscess is otherwise  noted with trace volume low density fluid scattered throughout and  mesenteric edema.       Assessment/Plan     Pt POD#5 ex lap, oversewing of duodenal ulcer x2, pyloroplasty, truncal vagotomy, G tube, J tube, cholecystectomy   UGI to evaluate for postoperative leak at pyloroplasty; if negative, OK for sips/ice chips   Continue tube feeds to goal for now  G tube to gravity  Pain control  DVT prlx         Yonathan Niño MD  6/2/2021  11:54 EDT      "

## 2021-06-02 NOTE — THERAPY EVALUATION
Patient Name: Jaime Connell  : 1960    MRN: [id number]                              Today's Date: 2021       Admit Date: 2021    Visit Dx:     ICD-10-CM ICD-9-CM   1. Acute upper GI bleed  K92.2 578.9   2. Hemorrhagic shock (CMS/HCC)  R57.8 785.59   3. Acute blood loss anemia  D62 285.1   4. Altered mental status, unspecified altered mental status type  R41.82 780.97   5. History of peptic ulcer disease  Z87.11 V12.71   6. History of hepatitis C  Z86.19 V12.09   7. Blood disease  D75.9 289.9   8. Duodenal ulcer  K26.9 532.90     Patient Active Problem List   Diagnosis   • Large penetrating prepyloric gastric ulcer. Clip placed 2019 (CMS/HCC)   • Smoker   • Acute blood loss anemia   • Hepatitis C antibody test positive   • ALICIA (acute kidney injury) (CMS/HCC)   • Hyperglycemia   • Coagulopathy (CMS/HCC)   • Elevated lactic acid level   • Acute respiratory failure   • R/O Aspiration pneumonitis (CMS/HCC)   • Tobacco abuse   • Encephalopathy, metabolic   • GIH Duodenal Ulcer     Past Medical History:   Diagnosis Date   • Acute blood loss anemia 2019   • Back pain    • Hepatitis C antibody test positive 2019   • Large penetrating prepyloric gastric ulcer. Clip placed 2019 (CMS/HCC) 2019   • Smoker 2019     Past Surgical History:   Procedure Laterality Date   • BACK SURGERY     • ENDOSCOPY N/A 2019    Procedure: ESOPHAGOGASTRODUODENOSCOPY;  Surgeon: Osmani Anderson MD;  Location:  [facility];  Service: Gastroenterology   • ENDOSCOPY N/A 2021    Procedure: ESOPHAGOGASTRODUODENOSCOPY AT BEDSIDE;  Surgeon: Osmani Anderson MD;  Location:  [facility];  Service: Gastroenterology;  Laterality: N/A;   • ENDOSCOPY N/A 2021    Procedure: ESOPHAGOGASTRODUODENOSCOPY AT BEDSIDE;  Surgeon: Osmani Anderson MD;  Location:  [facility];  Service: Gastroenterology;  Laterality: N/A;   • EXPLORATORY LAPAROTOMY N/A 2021    Procedure: LAPAROTOMY EXPLORATORY,  VAGOTOMY, PYLOROPLASTY, CHOLECYSTECTOMY;  Surgeon: Yonathan Niño MD;  Location:  [facility];  Service: General;  Laterality: N/A;   • GASTROSTOMY FEEDING TUBE INSERTION N/A 5/28/2021    Procedure: GASTROSTOMY AND JEJUNOSTOMY FEEDING TUBE INSERTION;  Surgeon: Yonathan Niño MD;  Location:  [facility];  Service: General;  Laterality: N/A;     General Information     Row Name 06/02/21 1457          Physical Therapy Time and Intention    Document Type  evaluation  -     Mode of Treatment  physical therapy  -     Row Name 06/02/21 1457          General Information    Patient Profile Reviewed  yes  -LS     Prior Level of Function  independent:;all household mobility;ADL's  -     Existing Precautions/Restrictions  (S) fall;oxygen therapy device and L/min;other (see comments) abd incision with binder; gastrostomy; J tube; MAURICE  -LS     Barriers to Rehab  medically complex  -LS     Row Name 06/02/21 1457          Living Environment    Lives With  significant other  -     Row Name 06/02/21 1457          Home Main Entrance    Number of Stairs, Main Entrance  two  -LS     Row Name 06/02/21 1457          Cognition    Orientation Status (Cognition)  oriented x 3  -LS     Row Name 06/02/21 1457          Safety Issues, Functional Mobility    Impairments Affecting Function (Mobility)  balance;endurance/activity tolerance;strength  -LS     Cognitive Impairments, Mobility Safety/Performance  insight into deficits/self-awareness  -       User Key  (r) = Recorded By, (t) = Taken By, (c) = Cosigned By    Initials Name Provider Type    LS Romana Rousseau, PT Physical Therapist        Mobility     Row Name 06/02/21 1500          Bed Mobility    Bed Mobility  sit-supine  -LS     Sit-Supine [place] (Bed Mobility)  supervision;verbal cues  -     Assistive Device (Bed Mobility)  head of bed elevated  -     Comment (Bed Mobility)  onto stretcher for transport to radiology  -     Row Name 06/02/21 1500           Sit-Stand Transfer    Sit-Stand [place] (Transfers)  contact guard;verbal cues  -     Assistive Device (Sit-Stand Transfers)  walker, front-wheeled  -LS     Row Name 06/02/21 1500          Gait/Stairs (Locomotion)    [place] Level (Gait)  minimum assist (75% patient effort);verbal cues  -LS     Assistive Device (Gait)  walker, front-wheeled  -LS     Distance in Feet (Gait)  25  -LS     Deviations/Abnormal Patterns (Gait)  bilateral deviations  -LS     Bilateral Gait Deviations  forward flexed posture;heel strike decreased  -     Comment (Gait/Stairs)  Min A for assist in negotiating RW; distance limited by arrival of radiology for testing.  -       User Key  (r) = Recorded By, (t) = Taken By, (c) = Cosigned By    Initials Name Provider Type    Romana Eugene, PT Physical Therapist        Obj/Interventions     Row Name 06/02/21 1502          Range of Motion Comprehensive    General Range of Motion  bilateral lower extremity ROM WFL  -     Row Name 06/02/21 1502          Strength Comprehensive (MMT)    General Manual Muscle Testing (MMT) Assessment  lower extremity strength deficits identified  -     Comment, General Manual Muscle Testing (MMT) Assessment  BLE grossly 4/5  -     Row Name 06/02/21 1502          Balance    Balance Assessment  sitting static balance;standing static balance  -     Static Sitting Balance  WFL;sitting in chair  -     Static Standing Balance  WFL;supported;standing  -     Row Name 06/02/21 1502          Sensory Assessment (Somatosensory)    Sensory Assessment (Somatosensory)  LE sensation intact  -       User Key  (r) = Recorded By, (t) = Taken By, (c) = Cosigned By    Initials Name Provider Type    Romana Eugene, PT Physical Therapist        Goals/Plan     Row Name 06/02/21 1507          Bed Mobility Goal 1 (PT)    Activity/Assistive Device (Bed Mobility Goal 1, PT)  sit to supine/supine to sit  -     [place] Level/Cues Needed (Bed Mobility Goal  1, PT)  independent  -LS     Time Frame (Bed Mobility Goal 1, PT)  2 weeks  -LS     Progress/Outcomes (Bed Mobility Goal 1, PT)  goal ongoing  -LS     Row Name 06/02/21 1507          Transfer Goal 1 (PT)    Activity/Assistive Device (Transfer Goal 1, PT)  sit-to-stand/stand-to-sit  -LS     [place] Level/Cues Needed (Transfer Goal 1, PT)  independent  -LS     Time Frame (Transfer Goal 1, PT)  2 weeks  -LS     Progress/Outcome (Transfer Goal 1, PT)  goal ongoing  -LS     Row Name 06/02/21 1507          Gait Training Goal 1 (PT)    Activity/Assistive Device (Gait Training Goal 1, PT)  gait (walking locomotion)  -LS     [place] Level (Gait Training Goal 1, PT)  independent  -LS     Distance (Gait Training Goal 1, PT)  400  -LS     Time Frame (Gait Training Goal 1, PT)  2 weeks  -LS     Row Name 06/02/21 1507          Stairs Goal 1 (PT)    Activity/Assistive Device (Stairs Goal 1, PT)  ascending stairs;descending stairs  -LS     [place] Level/Cues Needed (Stairs Goal 1, PT)  supervision required  -LS     Number of Stairs (Stairs Goal 1, PT)  2  -LS     Time Frame (Stairs Goal 1, PT)  2 weeks  -LS     Progress/Outcome (Stairs Goal 1, PT)  goal ongoing  -LS       User Key  (r) = Recorded By, (t) = Taken By, (c) = Cosigned By    Initials Name Provider Type    LS Romana Rousseau, PT Physical Therapist        Clinical Impression     Row Name 06/02/21 1502          Pain    Additional Documentation  Pain Scale: Numbers Pre/Post-Treatment (Group)  -LS     Row Name 06/02/21 1502          Pain Scale: Numbers Pre/Post-Treatment    Pretreatment Pain Rating  0/10 - no pain  -LS     Posttreatment Pain Rating  0/10 - no pain  -LS     Row Name 06/02/21 1502          Plan of Care Review    Plan of Care Reviewed With  patient;significant other  -LS     Progress  no change  -LS     Outcome Summary  PT initial evaluation completed. Pt demonstrates generalized weakness and decreased indep re: functional mobility, warranting  further skilled PT services to promote PLOF and safe d/c. Able to ambulate 25 ft with RW, min A (distance limited by arrival of radiology for transport). Recommend d/c home with assist and HHPT based upon current level of function (TBA further pending social support and medical needs at d/c).  -     Row Name 06/02/21 1502          Therapy Assessment/Plan (PT)    Patient/Family Therapy Goals Statement (PT)  return to PLOF  -LS     Rehab Potential (PT)  good, to achieve stated therapy goals  -     Criteria for Skilled Interventions Met (PT)  yes;skilled treatment is necessary  -     Row Name 06/02/21 1502          Vital Signs    Pre Systolic BP Rehab  140  -LS     Pre Treatment Diastolic BP  78  -LS     Pretreatment Heart Rate (beats/min)  93  -LS     Pre SpO2 (%)  95  -LS     O2 Delivery Pre Treatment  room air  -LS     O2 Delivery Intra Treatment  room air  -LS     O2 Delivery Post Treatment  room air  -LS     Pre Patient Position  Sitting  -LS     Intra Patient Position  Standing  -LS     Post Patient Position  Supine  -     Row Name 06/02/21 1502          Positioning and Restraints    Pre-Treatment Position  sitting in chair/recliner  -LS     Post Treatment Position  other  -LS     Other Position  with nsg;with other staff on stretcher for transport to radiology  -       User Key  (r) = Recorded By, (t) = Taken By, (c) = Cosigned By    Initials Name Provider Type    Romana Eugene, PT Physical Therapist        Outcome Measures     Row Name 06/02/21 1506          How much help from another person do you currently need...    Turning from your back to your side while in flat bed without using bedrails?  3  -LS     Moving from lying on back to sitting on the side of a flat bed without bedrails?  3  -LS     Moving to and from a bed to a chair (including a wheelchair)?  3  -LS     Standing up from a chair using your arms (e.g., wheelchair, bedside chair)?  3  -LS     Climbing 3-5 steps with a railing?  3   -LS     To walk in hospital room?  3  -LS     AM-PAC 6 Clicks Score (PT)  18  -LS     Row Name 06/02/21 1509          Functional Assessment    Outcome Measure Options  AM-PAC 6 Clicks Basic Mobility (PT)  -       User Key  (r) = Recorded By, (t) = Taken By, (c) = Cosigned By    Initials Name Provider Type    LS Romana Rousseau, PT Physical Therapist        Physical Therapy Education                 Title: PT OT SLP Therapies (In Progress)     Topic: Physical Therapy (In Progress)     Point: Mobility training (Done)     Learning Progress Summary           Patient Acceptance, E,D, VU,NR by  at 6/2/2021 1509   Significant Other Acceptance, E,D, VU,NR by LS at 6/2/2021 1509                   Point: Home exercise program (Not Started)     Learner Progress:  Not documented in this visit.          Point: Body mechanics (Done)     Learning Progress Summary           Patient Acceptance, E,D, VU,NR by LS at 6/2/2021 1509   Significant Other Acceptance, E,D, VU,NR by LS at 6/2/2021 1509                   Point: Precautions (Done)     Learning Progress Summary           Patient Acceptance, E,D, VU,NR by LS at 6/2/2021 1509   Significant Other Acceptance, E,D, VU,NR by LS at 6/2/2021 1509                               User Key     Initials Effective Dates Name Provider Type Discipline     06/19/15 -  Romana Rousseau, PT Physical Therapist PT              PT Recommendation and Plan  Planned Therapy Interventions (PT): balance training, bed mobility training, gait training, home exercise program, patient/family education, strengthening, stair training, transfer training  Plan of Care Reviewed With: patient, significant other  Progress: no change  Outcome Summary: PT initial evaluation completed. Pt demonstrates generalized weakness and decreased indep re: functional mobility, warranting further skilled PT services to promote PLOF and safe d/c. Able to ambulate 25 ft with RW, min A (distance limited by arrival of radiology for  transport). Recommend d/c home with assist and HHPT based upon current level of function (TBA further pending social support and medical needs at d/c).     Time Calculation:   PT Charges     Row Name 06/02/21 1511             Time Calculation    Start Time  1425  -LS      PT Received On  06/02/21  -LS      PT Goal Re-Cert Due Date  06/12/21  -LS         Timed Charges    65416 - PT Therapeutic Activity Minutes  8  -LS         Untimed Charges    PT Eval/Re-eval Minutes  32  -LS         Total Minutes    Timed Charges Total Minutes  8  -LS      Untimed Charges Total Minutes  32  -LS       Total Minutes  40  -LS        User Key  (r) = Recorded By, (t) = Taken By, (c) = Cosigned By    Initials Name Provider Type     Romana Rousseau, PT Physical Therapist        Therapy Charges for Today     Code Description Service Date Service Provider Modifiers Qty    17037935414 HC PT EVAL MOD COMPLEXITY 3 6/2/2021 Romana Rousseau, PT GP 1    75078349056 HC PT THERAPEUTIC ACT EA 15 MIN 6/2/2021 Romana Rousseau, PT GP 1          PT G-Codes  Outcome Measure Options: AM-PAC 6 Clicks Basic Mobility (PT)  AM-PAC 6 Clicks Score (PT): 18    Romana Rousseau, PT  6/2/2021

## 2021-06-02 NOTE — PLAN OF CARE
Goal Outcome Evaluation:  Plan of Care Reviewed With: patient  Progress: improving  Outcome Summary: VSS through the night, afebrile. Tolerating TF. Pt did have 3 liquid, dark red bowel movements through the night, however H&H remain stable. Pt notes some more abdomen pain tonight than before, given PRN dilaudid and tylonel.

## 2021-06-02 NOTE — PROGRESS NOTES
Clinical Nutrition   Reason For Visit: MDR, Follow-up protocol, EN, PN    Patient Name: Jaime Connell  YOB: 1960  MRN: [id number]  Date of Encounter: 06/02/21 08:57 EDT  Admission date: 5/22/2021        Nutrition Assessment     Admission Problem List:  Acute respiratory failure  Vent (5/22) --> self-extubated and then re-intubated (5/26) --> self-extubated (5/26) --> re-intubated (5/28)-> extubated (5/31)  Encephalopathy, improving  ALICIA, improved  Hematemesis  GI bleed  S/p EGD (5/23)- Large prepyloric ulcer with clot and proximal anterior wall ulcer with clot. [place] clip on Duodenal Ulcer. Massive intra operative bleeding. Clips applied.   S/p IR (5/23)- Coils x 10 deployed to Superior and inferior Pancreaticoduodenal and Gastroduodenal arteries as well as Gastroepiploic artery.  S/p EGD (5/25)- Large ulcer just past pylorus. 3 large prepyloric ulcers. Another ulcer on the lesser curve. Clips applied  S/p exploratory laparotomy (5/28)- truncal vagotomy, oversewing of duodenal ulcer, pyloroplasty, cholecystectomy, gastrostomy and jejunostomy feeding tubes placed        PMH: He  has a past medical history of Acute blood loss anemia (7/20/2019), Back pain, Hepatitis C antibody test positive (7/20/2019), Large penetrating prepyloric gastric ulcer. Clip placed 7/20/2019 (CMS/HCC) (7/20/2019), and Smoker (7/20/2019).Tobacco use   PSxH: He  has a past surgical history that includes Back surgery; Esophagogastroduodenoscopy (N/A, 7/20/2019); Esophagogastroduodenoscopy (N/A, 5/23/2021); Esophagogastroduodenoscopy (N/A, 5/25/2021); Exploratory Laparotomy (N/A, 5/28/2021); and gastrostomy feeding tube insertion (N/A, 5/28/2021).       Applicable Nutrition-Related Information:  (5/22) NPO  (5/26) EN started per intensivist- Peptamen Intense VHP at 25 ml/hr and free water at 30 ml q 2 hrs via NGT  (5/27) EN on hold this AM since pt started passing bloody output from FMS; PN started per intensivist- 180 g  "dextrose, 75 g amino acids at 75 ml/hr with 200 ml/day smoflipid via PICC  (5/28) PN adjusted per intensivist- 225 g dextrose, 110 g amino acids at 65 ml/hr with 200 ml/day smoflipids  (5/30) trophic EN started per intensivist- Peptamen Intense VHP at 15 ml/hr and free water at 15 ml q 2 hrs via J-tube  (5/31) EN rate increased to 30 ml/hr and free water at 30 ml q 2 hrs. Pt had increased abdominal distention later that night after EN rate was increased to 30 ml/hr, surgeon decreased EN rate back to 15 ml/hr.  (6/1) EN on hold this AM per surgeon until results of CT of abdomen/pelvis. EN restarted at 1330, OK to advance as tolerated per surgeon. PN adjusted per RD- D20%, AA7.5% at 65 ml/hr with 200 ml/day smoflipids       Reported/Observed/Food/Nutrition Related History     EN restarted yesterday (6/1), running at 30 ml/hr this AM. PN continues.     Pt resting in bed at time of RD visit. Pt reports that he feels hungry and thirsty, asking for ice chips. RD encouraged pt to ask surgeon about when able to start PO. Pt states that he is having abdominal pain that \"comes and goes.\"     Pt tolerating EN at 30 ml/hr. Less abdominal distention. Planning for upper GI series today. OK to advance EN as pt tolerated per surgeon.       Per I&O over the past 24 hrs:  g-tube output= 800 ml  MAURICE drain output= 125 ml  3 bowel movements      Anthropometrics   Height: 72 in  Weight: 168 lb per bed scale (5/22)  BMI: 22.9  BMI classification: Normal: 18.5-24.9kg/m2     Date Weight (kg) Weight (lbs) Weight Method   5/26/2021 82.8 kg 182 lb 8.7 oz Bed scale   5/25/2021 80.9 kg 178 lb 5.6 oz Bed scale   5/22/2021 76.5 kg 168 lb 10.4 oz Bed scale   5/22/2021 70.7 kg 155 lb 13.8 oz -   7/22/2019 70.7 kg 155 lb 13.8 oz Bed scale   7/21/2019 70.4 kg 155 lb 3.3 oz Bed scale   7/19/2019 79.4 kg 175 lb 0.7 oz -       Labs reviewed   Labs reviewed: Yes  Replacing Mg and phos    Results from last 7 days   Lab Units 06/02/21  0233 06/01/21  1543 " 06/01/21  0345 05/31/21  0405   SODIUM mmol/L 133*  --  133* 139   POTASSIUM mmol/L 3.9 3.5 3.4* 3.8   CHLORIDE mmol/L 99  --  98 103   CO2 mmol/L 26.0  --  24.0 26.0   BUN mg/dL 20  --  19 23   CREATININE mg/dL 0.67*  --  0.72* 0.66*   GLUCOSE mg/dL 168*  --  121* 143*   CALCIUM mg/dL 8.5*  --  8.2* 8.3*   PHOSPHORUS mg/dL 1.7* 2.3* 1.8* 2.4*   MAGNESIUM mg/dL 1.7  --  2.3 1.6   TRIGLYCERIDES mg/dL  --   --   --  65     Results from last 7 days   Lab Units 06/02/21  0233 06/01/21  0345 05/31/21  0405 05/30/21  0917 05/29/21  0343   WBC 10*3/mm3 12.53* 11.16* 9.36 13.13* 14.03*   ALBUMIN g/dL  --   --  2.40* 2.40* 2.90*   PREALBUMIN mg/dL  --   --  8.4*  --   --    CRP mg/dL  --   --  8.21*  --   --    TRIGLYCERIDES mg/dL  --   --  65  --   --      Results from last 7 days   Lab Units 06/02/21  0519 06/01/21  2356 06/01/21  1751 06/01/21  1133 06/01/21  0533 05/31/21  2326   GLUCOSE mg/dL 146* 140* 142* 133* 130 97     Lab Results   Lab Value Date/Time    HGBA1C 5.90 (H) 07/20/2019 0611     Medications reviewed   Medications reviewed: Yes  Pertinent: antibiotics, insulin, protonix  PRN: tylenol, dilaudid      Needs Assessment  (6/1)   Height used: 72 in/183 cm  Weight used: 168 lb/77 kg    Estimated Calories needs: ~1900 kcal/day  20-25 kcal/kg= 1735-1330 kcal    Estimated Protein needs: ~115 g pro/day  1.5 g/kg= 115 g pro      Current Nutrition Prescription   PO: NPO Diet  Adult Central 2-in-1 TPN       EN: Pepteman Intense VHP at 30 ml/hr (goal volume= 600 ml/day) and free water at 15 ml q 2 hr  Route: J-tube  Verified at the bedside: Yes      PN: D20%, AA7.5% at 65 ml/hr with 200 ml/day smoflipid  Route: PICC  Verified at the bedside: Yes  Provides at goal: 1560 ml, 1868 kcal, 117 g pro, GIR= 1.3 mg/kg/min      Evaluation of Received Nutrient/Fluid Intake-EN:  1 Day:  333 ml      Evaluation of Received Nutrient/Fluid Intake-PN:  1 Day:   1515 ml -- reflects delivery of two different PN orders  +247 ml  smoflipid      Nutrition Diagnosis   5/25, updated 5/28  Problem Inadequate energy intake   Etiology Clinical condition, diet order   Signs/Symptoms Pt requiring mechanical ventilation, massive GI bleed, multiple clips and coils applied, pressor   Status: PN started (5/27)    Intervention   Intervention: Follow treatment progress, Care plan reviewed, Nutrition support order placed   -Will increase EN rate to 45 ml/hr (goal volume= 900 ml/day) and free water at 15 ml q 2 hrs via j-tube  -Will provide at goal volume:  900 kcal  82 g pro  3 g fiber  756 ml water from EN  906 ml water total    -Will d/c 200 ml/day smoflipids, but otherwise will continue with current {N order at this time, discussed with pharmacist  -Will provide at goal:  1468 kcal  117 g pro         Goal:   General: Nutrition support treatment  PO: N/A   EN/PN: Adjust EN, Adjust PN, PN to EN      Monitoring/Evaluation:   Monitoring/Evaluation: Per protocol, Pertinent labs, EN delivery/tolerance, PN delivery/tolerance, GI status, Symptoms    Radha Gonzalez, MS RD/LD CNSC  Time Spent: 45 minutes

## 2021-06-02 NOTE — PLAN OF CARE
Goal Outcome Evaluation:  Plan of Care Reviewed With: patient, significant other  Progress: no change  Outcome Summary: PT initial evaluation completed. Pt demonstrates generalized weakness and decreased indep re: functional mobility, warranting further skilled PT services to promote PLOF and safe d/c. Able to ambulate 25 ft with RW, min A (distance limited by arrival of radiology for transport). Recommend d/c home with assist and HHPT based upon current level of function (TBA further pending social support and medical needs at d/c).

## 2021-06-02 NOTE — CASE MANAGEMENT/SOCIAL WORK
Continued Stay Note  [facility]     Patient Name: Jaime Connell  MRN: [id number]  Today's Date: 2021    Admit Date: 2021    Discharge Plan     Row Name 21 1402       Plan    Plan  Will visit again    Plan Comments  Spoke with pt alone. He reports he has been in a relationship with Catrachita for 6 to 7 years. States she is unemployed and works as a blacktopper. He reports they do argue. States he has siblings and spoke with his sister Daly Mercedes in [facility] [phone]. Also reports a sister and brother in [facility] but did not provide their contact info. Pt difficult to keep on topic. While discussing if he believed Catrachita is girlfriend was trying to hurt him Catrachita came back to room to visit. MSW excused herself and will revisit again.    Row Name 21 4308       Plan    Plan  Ongoing    Patient/Family in Agreement with Plan  yes    Plan Comments  Spoke with patient at bedside about possible rehab.  PT/OT evals are pending.  Per patients RN patient did well getting up in chair.  Patient isn't sure he would want to do rehab.  Also discussed with patient next of kin.  Patient states he has a biological son and he helped raise two daughters, of which one is .  Patient states he is estranged from all.  Offered to reach out to son if he wanted.  Patient then started talking about visiting his parents graves when he is discharged then mentioned he had a sister.  Offered to reach out to sister.  Patient states her name is Daly Mercedes and her phone number is [phone].  This information given to  who will speak to patient and reach out to see if this is the patients sister.  CM will continue to follow for discharge needs.        Discharge Codes    No documentation.       Expected Discharge Date and Time     Expected Discharge Date Expected Discharge Time    2021             FRANSICO Espinoza

## 2021-06-03 ENCOUNTER — TELEPHONE (OUTPATIENT)
Dept: GASTROENTEROLOGY | Facility: CLINIC | Age: 61
End: 2021-06-03

## 2021-06-03 VITALS
WEIGHT: 170.9 LBS | SYSTOLIC BLOOD PRESSURE: 125 MMHG | BODY MASS INDEX: 23.15 KG/M2 | RESPIRATION RATE: 18 BRPM | DIASTOLIC BLOOD PRESSURE: 66 MMHG | HEART RATE: 75 BPM | HEIGHT: 72 IN | TEMPERATURE: 98.3 F | OXYGEN SATURATION: 94 %

## 2021-06-03 LAB
ANION GAP SERPL CALCULATED.3IONS-SCNC: 9 MMOL/L (ref 5–15)
BACTERIA SPEC RESP CULT: ABNORMAL
BASOPHILS # BLD AUTO: 0.06 10*3/MM3 (ref 0–0.2)
BASOPHILS NFR BLD AUTO: 0.5 % (ref 0–1.5)
BUN SERPL-MCNC: 22 MG/DL (ref 8–23)
BUN/CREAT SERPL: 34.9 (ref 7–25)
CALCIUM SPEC-SCNC: 8.9 MG/DL (ref 8.6–10.5)
CHLORIDE SERPL-SCNC: 101 MMOL/L (ref 98–107)
CO2 SERPL-SCNC: 25 MMOL/L (ref 22–29)
CREAT SERPL-MCNC: 0.63 MG/DL (ref 0.76–1.27)
DEPRECATED RDW RBC AUTO: 55.8 FL (ref 37–54)
EOSINOPHIL # BLD AUTO: 0.38 10*3/MM3 (ref 0–0.4)
EOSINOPHIL NFR BLD AUTO: 3.1 % (ref 0.3–6.2)
ERYTHROCYTE [DISTWIDTH] IN BLOOD BY AUTOMATED COUNT: 17.6 % (ref 12.3–15.4)
GFR SERPL CREATININE-BSD FRML MDRD: 130 ML/MIN/1.73
GLUCOSE BLDC GLUCOMTR-MCNC: 150 MG/DL (ref 70–130)
GLUCOSE BLDC GLUCOMTR-MCNC: 166 MG/DL (ref 70–130)
GLUCOSE BLDC GLUCOMTR-MCNC: 181 MG/DL (ref 70–130)
GLUCOSE SERPL-MCNC: 162 MG/DL (ref 65–99)
GRAM STN SPEC: ABNORMAL
HCT VFR BLD AUTO: 38.8 % (ref 37.5–51)
HGB BLD-MCNC: 12.3 G/DL (ref 13–17.7)
IMM GRANULOCYTES # BLD AUTO: 0.24 10*3/MM3 (ref 0–0.05)
IMM GRANULOCYTES NFR BLD AUTO: 2 % (ref 0–0.5)
LYMPHOCYTES # BLD AUTO: 1.55 10*3/MM3 (ref 0.7–3.1)
LYMPHOCYTES NFR BLD AUTO: 12.8 % (ref 19.6–45.3)
MAGNESIUM SERPL-MCNC: 1.9 MG/DL (ref 1.6–2.4)
MCH RBC QN AUTO: 27.6 PG (ref 26.6–33)
MCHC RBC AUTO-ENTMCNC: 31.7 G/DL (ref 31.5–35.7)
MCV RBC AUTO: 87 FL (ref 79–97)
MONOCYTES # BLD AUTO: 1.11 10*3/MM3 (ref 0.1–0.9)
MONOCYTES NFR BLD AUTO: 9.1 % (ref 5–12)
NEUTROPHILS NFR BLD AUTO: 72.5 % (ref 42.7–76)
NEUTROPHILS NFR BLD AUTO: 8.81 10*3/MM3 (ref 1.7–7)
NRBC BLD AUTO-RTO: 0 /100 WBC (ref 0–0.2)
PHOSPHATE SERPL-MCNC: 2.5 MG/DL (ref 2.5–4.5)
PLATELET # BLD AUTO: 322 10*3/MM3 (ref 140–450)
PMV BLD AUTO: 9.7 FL (ref 6–12)
POTASSIUM SERPL-SCNC: 4 MMOL/L (ref 3.5–5.2)
RBC # BLD AUTO: 4.46 10*6/MM3 (ref 4.14–5.8)
SODIUM SERPL-SCNC: 135 MMOL/L (ref 136–145)
WBC # BLD AUTO: 12.15 10*3/MM3 (ref 3.4–10.8)

## 2021-06-03 PROCEDURE — 97535 SELF CARE MNGMENT TRAINING: CPT

## 2021-06-03 PROCEDURE — 25010000002 CEFTRIAXONE PER 250 MG: Performed by: INTERNAL MEDICINE

## 2021-06-03 PROCEDURE — 80048 BASIC METABOLIC PNL TOTAL CA: CPT | Performed by: INTERNAL MEDICINE

## 2021-06-03 PROCEDURE — 99239 HOSP IP/OBS DSCHRG MGMT >30: CPT | Performed by: INTERNAL MEDICINE

## 2021-06-03 PROCEDURE — 84100 ASSAY OF PHOSPHORUS: CPT | Performed by: INTERNAL MEDICINE

## 2021-06-03 PROCEDURE — 82962 GLUCOSE BLOOD TEST: CPT

## 2021-06-03 PROCEDURE — 63710000001 INSULIN REGULAR HUMAN PER 5 UNITS: Performed by: INTERNAL MEDICINE

## 2021-06-03 PROCEDURE — 83735 ASSAY OF MAGNESIUM: CPT | Performed by: INTERNAL MEDICINE

## 2021-06-03 PROCEDURE — 25010000003 MAGNESIUM SULFATE 4 GM/100ML SOLUTION: Performed by: INTERNAL MEDICINE

## 2021-06-03 PROCEDURE — 25010000002 HYDROMORPHONE PER 4 MG: Performed by: INTERNAL MEDICINE

## 2021-06-03 PROCEDURE — 97166 OT EVAL MOD COMPLEX 45 MIN: CPT

## 2021-06-03 PROCEDURE — 85025 COMPLETE CBC W/AUTO DIFF WBC: CPT | Performed by: INTERNAL MEDICINE

## 2021-06-03 PROCEDURE — 63710000001 INSULIN DETEMIR PER 5 UNITS: Performed by: INTERNAL MEDICINE

## 2021-06-03 RX ADMIN — INSULIN DETEMIR 15 UNITS: 100 INJECTION, SOLUTION SUBCUTANEOUS at 08:55

## 2021-06-03 RX ADMIN — SODIUM CHLORIDE 1 G: 900 INJECTION INTRAVENOUS at 12:19

## 2021-06-03 RX ADMIN — INSULIN HUMAN 3 UNITS: 100 INJECTION, SOLUTION PARENTERAL at 12:18

## 2021-06-03 RX ADMIN — HYDROMORPHONE HYDROCHLORIDE 0.5 MG: 1 INJECTION, SOLUTION INTRAMUSCULAR; INTRAVENOUS; SUBCUTANEOUS at 01:27

## 2021-06-03 RX ADMIN — HYDROCODONE BITARTRATE AND ACETAMINOPHEN 10 ML: 7.5; 325 SOLUTION ORAL at 08:53

## 2021-06-03 RX ADMIN — PANTOPRAZOLE SODIUM 40 MG: 40 INJECTION, POWDER, FOR SOLUTION INTRAVENOUS at 08:53

## 2021-06-03 RX ADMIN — Medication 1 PATCH: at 08:54

## 2021-06-03 RX ADMIN — MAGNESIUM SULFATE HEPTAHYDRATE 4 G: 40 INJECTION, SOLUTION INTRAVENOUS at 08:53

## 2021-06-03 NOTE — NURSING NOTE
Prior to central line removal, order for the removal of catheter was verified, patient was assessed, necessary materials were gathered and patient educated.      Patient was positioned flat to ensure that the insertion site was at or below the level of the heart.    Hands were washed, clean gloves were applied and central line dressing was gently removed. Catheter exit site was not cultured.     A new pair of clean gloves were then applied. Insertion site was cleansed with 2% Chlorhexidine swab using a circular motion beginning at the insertion site and moving outward for 30 seconds and allowed to dry.     Clamp on line not present.       Patient valsalva.      The central line was grasped at the insertion site and slowly pulled outward parallel to the skin. Resistance not met.     After central line was completely removed, a sterile 4x4 gauze pad was used to apply light pressure until bleeding stopped. At that time, petroleum-based gauze and a sterile occlusive dressing was applied to exit site.     Patient was instructed to keep dressing in place for at least 24 hours and lay flat for 30 minutes. Patient agreeable.      Catheter was inspected after removal and intact. Tip of central line was not sent for culture. Patient tolerated procedure.

## 2021-06-03 NOTE — PLAN OF CARE
VSS, awake majority of shift,  SR on monitor, RA, UOP good, continues on TPN/TF, no other issues/concerns, will monitor      Problem: Restraint, Nonbehavioral (Nonviolent)  Goal: Discontinuation Criteria Achieved  Outcome: Ongoing, Progressing  Intervention: Implement Least-restrictive Safety Strategies  Recent Flowsheet Documentation  Taken 6/2/2021 2030 by Stephanie Espinosa RN  Diversional Activities: television  Goal: Personal Dignity and Safety Maintained  Outcome: Ongoing, Progressing  Intervention: Protect Dignity, Rights, and Personal Wellbeing  Recent Flowsheet Documentation  Taken 6/2/2021 2030 by Stephanie Espinosa RN  Trust Relationship/Rapport:   care explained   choices provided   emotional support provided   empathic listening provided   questions answered   questions encouraged   reassurance provided   thoughts/feelings acknowledged  Intervention: Protect Skin and Joint Integrity  Recent Flowsheet Documentation  Taken 6/3/2021 0400 by Stephanie Espinosa RN  Body Position: position changed independently  Taken 6/3/2021 0200 by Stephanie Espinosa RN  Body Position: position changed independently  Taken 6/3/2021 0000 by Stephanie Espinosa RN  Body Position: position changed independently  Taken 6/2/2021 2200 by Stephanie Espinosa RN  Body Position: position changed independently  Taken 6/2/2021 2030 by Stephanie Espinosa RN  Body Position: position changed independently     Problem: Skin Injury Risk Increased  Goal: Skin Health and Integrity  Outcome: Ongoing, Progressing  Intervention: Optimize Skin Protection  Recent Flowsheet Documentation  Taken 6/3/2021 0400 by Stephanie Espinosa RN  Pressure Reduction Techniques:   frequent weight shift encouraged   pressure points protected   weight shift assistance provided  Pressure Reduction Devices:   pressure-redistributing mattress utilized   positioning supports utilized  Skin Protection:   adhesive use limited   incontinence pads utilized   tubing/devices  free from skin contact  Taken 6/3/2021 0200 by Stephanie Espinosa RN  Pressure Reduction Techniques:   frequent weight shift encouraged   pressure points protected   weight shift assistance provided  Pressure Reduction Devices:   pressure-redistributing mattress utilized   positioning supports utilized  Skin Protection:   adhesive use limited   incontinence pads utilized   tubing/devices free from skin contact  Taken 6/3/2021 0000 by Stephanie Espinosa RN  Pressure Reduction Techniques:   frequent weight shift encouraged   pressure points protected   weight shift assistance provided  Pressure Reduction Devices:   pressure-redistributing mattress utilized   positioning supports utilized  Skin Protection:   adhesive use limited   incontinence pads utilized   tubing/devices free from skin contact  Taken 6/2/2021 2200 by Stephanie Espinosa RN  Pressure Reduction Techniques:   frequent weight shift encouraged   weight shift assistance provided   pressure points protected  Pressure Reduction Devices:   pressure-redistributing mattress utilized   positioning supports utilized  Skin Protection:   adhesive use limited   incontinence pads utilized   tubing/devices free from skin contact  Taken 6/2/2021 2030 by Stephanie Espinosa RN  Pressure Reduction Techniques:   frequent weight shift encouraged   weight shift assistance provided  Head of Bed (HOB): HOB elevated  Pressure Reduction Devices:   pressure-redistributing mattress utilized   positioning supports utilized  Skin Protection:   adhesive use limited   incontinence pads utilized   tubing/devices free from skin contact     Problem: Fall Injury Risk  Goal: Absence of Fall and Fall-Related Injury  Outcome: Ongoing, Progressing  Intervention: Identify and Manage Contributors to Fall Injury Risk  Recent Flowsheet Documentation  Taken 6/2/2021 2030 by Stephanie Espinosa RN  Medication Review/Management:   medications reviewed   high-risk medications identified  Intervention:  Promote Injury-Free Environment  Recent Flowsheet Documentation  Taken 6/3/2021 0400 by Stephanie Espinosa RN  Safety Promotion/Fall Prevention:   activity supervised   assistive device/personal items within reach   clutter free environment maintained   fall prevention program maintained   gait belt   lighting adjusted   nonskid shoes/slippers when out of bed   room organization consistent   safety round/check completed   toileting scheduled  Taken 6/3/2021 0200 by Stephanie Espinosa RN  Safety Promotion/Fall Prevention:   activity supervised   assistive device/personal items within reach   clutter free environment maintained   fall prevention program maintained   gait belt   lighting adjusted   nonskid shoes/slippers when out of bed   room organization consistent   safety round/check completed   toileting scheduled  Taken 6/3/2021 0000 by Stephanie Espinosa RN  Safety Promotion/Fall Prevention:   activity supervised   assistive device/personal items within reach   clutter free environment maintained   fall prevention program maintained   gait belt   lighting adjusted   nonskid shoes/slippers when out of bed   room organization consistent   safety round/check completed   toileting scheduled  Taken 6/2/2021 2200 by Stephanie Espinosa RN  Safety Promotion/Fall Prevention:   activity supervised   assistive device/personal items within reach   clutter free environment maintained   fall prevention program maintained   gait belt   lighting adjusted   nonskid shoes/slippers when out of bed   room organization consistent   safety round/check completed   toileting scheduled  Taken 6/2/2021 2030 by Stephanie Espinosa RN  Safety Promotion/Fall Prevention:   assistive device/personal items within reach   activity supervised   clutter free environment maintained   fall prevention program maintained   gait belt   lighting adjusted   nonskid shoes/slippers when out of bed   room organization consistent   safety round/check  completed   toileting scheduled     Problem: Adult Inpatient Plan of Care  Goal: Plan of Care Review  Outcome: Ongoing, Progressing  Flowsheets (Taken 6/3/2021 0422)  Progress: no change  Plan of Care Reviewed With: patient  Goal: Patient-Specific Goal (Individualized)  Outcome: Ongoing, Progressing  Goal: Absence of Hospital-Acquired Illness or Injury  Outcome: Ongoing, Progressing  Intervention: Identify and Manage Fall Risk  Recent Flowsheet Documentation  Taken 6/3/2021 0400 by Stephanie Espinosa RN  Safety Promotion/Fall Prevention:   activity supervised   assistive device/personal items within reach   clutter free environment maintained   fall prevention program maintained   gait belt   lighting adjusted   nonskid shoes/slippers when out of bed   room organization consistent   safety round/check completed   toileting scheduled  Taken 6/3/2021 0200 by Stephanie Espinosa RN  Safety Promotion/Fall Prevention:   activity supervised   assistive device/personal items within reach   clutter free environment maintained   fall prevention program maintained   gait belt   lighting adjusted   nonskid shoes/slippers when out of bed   room organization consistent   safety round/check completed   toileting scheduled  Taken 6/3/2021 0000 by Stephanie Espinosa RN  Safety Promotion/Fall Prevention:   activity supervised   assistive device/personal items within reach   clutter free environment maintained   fall prevention program maintained   gait belt   lighting adjusted   nonskid shoes/slippers when out of bed   room organization consistent   safety round/check completed   toileting scheduled  Taken 6/2/2021 2200 by Stephanie Espinosa RN  Safety Promotion/Fall Prevention:   activity supervised   assistive device/personal items within reach   clutter free environment maintained   fall prevention program maintained   gait belt   lighting adjusted   nonskid shoes/slippers when out of bed   room organization consistent   safety  round/check completed   toileting scheduled  Taken 6/2/2021 2030 by Stephanie Espinosa RN  Safety Promotion/Fall Prevention:   assistive device/personal items within reach   activity supervised   clutter free environment maintained   fall prevention program maintained   gait belt   lighting adjusted   nonskid shoes/slippers when out of bed   room organization consistent   safety round/check completed   toileting scheduled  Intervention: Prevent Skin Injury  Recent Flowsheet Documentation  Taken 6/3/2021 0400 by Stephanie Espinosa RN  Body Position: position changed independently  Skin Protection:   adhesive use limited   incontinence pads utilized   tubing/devices free from skin contact  Taken 6/3/2021 0200 by Stephanie Espinosa RN  Body Position: position changed independently  Skin Protection:   adhesive use limited   incontinence pads utilized   tubing/devices free from skin contact  Taken 6/3/2021 0000 by Stephanie Espinosa RN  Body Position: position changed independently  Skin Protection:   adhesive use limited   incontinence pads utilized   tubing/devices free from skin contact  Taken 6/2/2021 2200 by Stephanie Espinosa RN  Body Position: position changed independently  Skin Protection:   adhesive use limited   incontinence pads utilized   tubing/devices free from skin contact  Taken 6/2/2021 2030 by Stephanie Espinosa RN  Body Position: position changed independently  Skin Protection:   adhesive use limited   incontinence pads utilized   tubing/devices free from skin contact  Intervention: Prevent and Manage VTE (venous thromboembolism) Risk  Recent Flowsheet Documentation  Taken 6/2/2021 2030 by Stephanie Espinosa RN  VTE Prevention/Management:   bilateral   dorsiflexion/plantar flexion performed   bleeding risk factor(s) identified   sequential compression devices off   patient refused intervention  Goal: Optimal Comfort and Wellbeing  Outcome: Ongoing, Progressing  Intervention: Provide Person-Centered  Care  Recent Flowsheet Documentation  Taken 6/2/2021 2030 by Stephanie Espinosa RN  Trust Relationship/Rapport:   care explained   choices provided   emotional support provided   empathic listening provided   questions answered   questions encouraged   reassurance provided   thoughts/feelings acknowledged  Goal: Readiness for Transition of Care  Outcome: Ongoing, Progressing  Intervention: Mutually Develop Transition Plan  Recent Flowsheet Documentation  Taken 6/2/2021 2235 by Stephanie Espinosa RN  Equipment Currently Used at Home: none     Problem: Communication Impairment (Mechanical Ventilation, Invasive)  Goal: Effective Communication  Outcome: Ongoing, Progressing     Problem: Device-Related Complication Risk (Mechanical Ventilation, Invasive)  Goal: Optimal Device Function  Outcome: Ongoing, Progressing     Problem: Inability to Wean (Mechanical Ventilation, Invasive)  Goal: Mechanical Ventilation Liberation  Outcome: Ongoing, Progressing  Intervention: Promote Extubation and Mechanical Ventilation Liberation  Recent Flowsheet Documentation  Taken 6/2/2021 2030 by Stephanie Espinosa RN  Environmental Support: calm environment promoted  Sleep/Rest Enhancement:   awakenings minimized   regular sleep/rest pattern promoted  Medication Review/Management:   medications reviewed   high-risk medications identified     Problem: Nutrition Impairment (Mechanical Ventilation, Invasive)  Goal: Optimal Nutrition Delivery  Outcome: Ongoing, Progressing     Problem: Skin and Tissue Injury (Mechanical Ventilation, Invasive)  Goal: Absence of Device-Related Skin and Tissue Injury  Outcome: Ongoing, Progressing  Intervention: Maintain Skin and Tissue Health  Recent Flowsheet Documentation  Taken 6/3/2021 0400 by Stephanie Espinosa RN  Device Skin Pressure Protection:   absorbent pad utilized/changed   adhesive use limited   pressure points protected  Taken 6/3/2021 0200 by Stephanie Espinosa RN  Device Skin Pressure  Protection:   absorbent pad utilized/changed   adhesive use limited   pressure points protected   skin-to-device areas padded  Taken 6/3/2021 0000 by Stephanie Espinosa RN  Device Skin Pressure Protection:   absorbent pad utilized/changed   adhesive use limited   pressure points protected   skin-to-device areas padded  Taken 6/2/2021 2200 by Stephanie Espinosa RN  Device Skin Pressure Protection:   absorbent pad utilized/changed   adhesive use limited   pressure points protected   skin-to-device areas padded  Taken 6/2/2021 2030 by Stephanie Espinosa RN  Device Skin Pressure Protection:   absorbent pad utilized/changed   adhesive use limited   pressure points protected   skin-to-device areas padded     Problem: Ventilator-Induced Lung Injury (Mechanical Ventilation, Invasive)  Goal: Absence of Ventilator-Induced Lung Injury  Outcome: Ongoing, Progressing  Intervention: Prevent Ventilator-Associated Pneumonia  Recent Flowsheet Documentation  Taken 6/2/2021 2030 by Stephanie Espinosa RN  Head of Bed (HOB): HOB elevated   Goal Outcome Evaluation:  Plan of Care Reviewed With: patient  Progress: no change

## 2021-06-03 NOTE — PROGRESS NOTES
"Patient Name:  Jaime Connell  YOB: 1960  [id number]    Surgery Progress Note    Date of visit: 6/3/2021    Subjective   No acute events overnight. Tolerated sips/chips. Multiple BMs. No nausea/vomiting with G tube clamped. Pain improved. Tmax 99. TF at goal         Objective       /96 (BP Location: Left arm, Patient Position: Lying)   Pulse 72   Temp 99 °F (37.2 °C) (Oral)   Resp 20   Ht 182.9 cm (72.01\")   Wt 77.5 kg (170 lb 14.4 oz)   SpO2 94%   BMI 23.17 kg/m²     Intake/Output Summary (Last 24 hours) at 6/3/2021 0940  Last data filed at 6/3/2021 0923  Gross per 24 hour   Intake 2634.38 ml   Output 1080 ml   Net 1554.38 ml   MAURICE 50 cc    CV:  Rhythm regular and rate regular  L:  Clear to auscultation bilaterally  Abd:  Bowel sounds positive, soft, inc c/d/i, MAURICE serous  Ext:  No cyanosis, clubbing, edema    Recent labs and imaging that are back at this time have been reviewed.   WBCs 12.2  Hgb 12.6 -> 12.3  Cr 0.63    UGI:  IMPRESSION:  Status post pyloroplasty. There was no evidence of gross  extraluminal contrast. There was no delay in gastric emptying.       Assessment/Plan       Pt POD#6 ex lap, oversewing of duodenal ulcer x2, pyloroplasty, truncal vagotomy, G tube, J tube, cholecystectomy   Adv to clear liquids  Continue tube feeds to goal for now  OK to wean TPN to off  G tube clamped  Pain control  DVT prlx       Yonathan Niño MD  6/3/2021  09:40 EDT      "

## 2021-06-03 NOTE — CASE MANAGEMENT/SOCIAL WORK
Continued Stay Note  [facility]     Patient Name: Jaime Connell  MRN: [id number]  Today's Date: 6/3/2021    Admit Date: 5/22/2021    Discharge Plan     Row Name 06/03/21 1411       Plan    Plan  discharge plan    Plan Comments  CM spoke with pt in room regarding discharge plan. Pt states he plans to go home at discharge and Catrachita, his s/o, can help him. Pt states he is not interested in inpatient rehab, but may be agreeable to HH. Pt not medically ready for discharge at this time as he remains on TPN and currently has tube feedings.  Pt states if he does agree to HH, he has no preference to HH agencies.  Pt states his neighbor, Diana can transport him home. CM will cont to follow.    Final Discharge Disposition Code  30 - still a patient        Discharge Codes    No documentation.       Expected Discharge Date and Time     Expected Discharge Date Expected Discharge Time    Jun 8, 2021             Maura العلي RN

## 2021-06-03 NOTE — THERAPY EVALUATION
Patient Name: Jaime Connell  : 1960    MRN: [id number]                              Today's Date: 6/3/2021       Admit Date: 2021    Visit Dx:     ICD-10-CM ICD-9-CM   1. Acute upper GI bleed  K92.2 578.9   2. Hemorrhagic shock (CMS/HCC)  R57.8 785.59   3. Acute blood loss anemia  D62 285.1   4. Altered mental status, unspecified altered mental status type  R41.82 780.97   5. History of peptic ulcer disease  Z87.11 V12.71   6. History of hepatitis C  Z86.19 V12.09   7. Blood disease  D75.9 289.9   8. Duodenal ulcer  K26.9 532.90     Patient Active Problem List   Diagnosis   • Large penetrating prepyloric gastric ulcer. Clip placed 2019 (CMS/HCC)   • Smoker   • Acute blood loss anemia   • Hepatitis C antibody test positive   • ALICIA (acute kidney injury) (CMS/HCC)   • Hyperglycemia   • Coagulopathy (CMS/HCC)   • Elevated lactic acid level   • Acute respiratory failure   • R/O Aspiration pneumonitis (CMS/HCC)   • Tobacco abuse   • Encephalopathy, metabolic   • GIH Duodenal Ulcer     Past Medical History:   Diagnosis Date   • Acute blood loss anemia 2019   • Back pain    • Hepatitis C antibody test positive 2019   • Large penetrating prepyloric gastric ulcer. Clip placed 2019 (CMS/HCC) 2019   • Smoker 2019     Past Surgical History:   Procedure Laterality Date   • BACK SURGERY     • ENDOSCOPY N/A 2019    Procedure: ESOPHAGOGASTRODUODENOSCOPY;  Surgeon: Osmani Anderson MD;  Location:  [facility];  Service: Gastroenterology   • ENDOSCOPY N/A 2021    Procedure: ESOPHAGOGASTRODUODENOSCOPY AT BEDSIDE;  Surgeon: Osmani Anderson MD;  Location:  [facility];  Service: Gastroenterology;  Laterality: N/A;   • ENDOSCOPY N/A 2021    Procedure: ESOPHAGOGASTRODUODENOSCOPY AT BEDSIDE;  Surgeon: Osmani Anderson MD;  Location:  [facility];  Service: Gastroenterology;  Laterality: N/A;   • EXPLORATORY LAPAROTOMY N/A 2021    Procedure: LAPAROTOMY EXPLORATORY,  VAGOTOMY, PYLOROPLASTY, CHOLECYSTECTOMY;  Surgeon: Yonathan Niño MD;  Location:  [facility];  Service: General;  Laterality: N/A;   • GASTROSTOMY FEEDING TUBE INSERTION N/A 5/28/2021    Procedure: GASTROSTOMY AND JEJUNOSTOMY FEEDING TUBE INSERTION;  Surgeon: Yonathan Niño MD;  Location:  [facility];  Service: General;  Laterality: N/A;     General Information     Row Name 06/03/21 [facility]4          OT Time and Intention    Document Type  evaluation  -     Mode of Treatment  occupational therapy  -     Row Name 06/03/21 [facility]4          General Information    Patient Profile Reviewed  yes  -     Prior Level of Function  independent:;all household mobility;community mobility;driving;work;home management  -     Existing Precautions/Restrictions  fall;oxygen therapy device and L/min;other (see comments) abd incision with binder; gastrostomy; J tube; MAURICE  -SW     Barriers to Rehab  medically complex  -     Row Name 06/03/21 1344          Occupational Profile    Environmental Supports and Barriers (Occupational Profile)  Pt worked and was independent in everyday activity.  -     Row Name 06/03/21 1344          Living Environment    Lives With  significant other  -     Row Name 06/03/21 1344          Home Main Entrance    Number of Stairs, Main Entrance  two  -     Row Name 06/03/21 1344          Cognition    Orientation Status (Cognition)  oriented x 4  -     Row Name 06/03/21 1344          Safety Issues, Functional Mobility    Safety Issues Affecting Function (Mobility)  insight into deficits/self-awareness;safety precautions follow-through/compliance  -     Impairments Affecting Function (Mobility)  balance;endurance/activity tolerance;strength  -       User Key  (r) = Recorded By, (t) = Taken By, (c) = Cosigned By    Initials Name Provider Type    SW Cheryl Serrano OT Occupational Therapist          Mobility/ADL's     Row Name 06/03/21 1344          Bed Mobility    Comment (Bed Mobility)  [facility]  -      [facility] Name 06/03/21 1344          Transfers    Transfers  sit-stand transfer  -     Sit-Stand [place] (Transfers)  contact guard;verbal cues  -SW     Row Name 06/03/21 1344          Sit-Stand Transfer    Assistive Device (Sit-Stand Transfers)  other (see comments) no device  -SW     Row Name 06/03/21 1344          Functional Mobility    Functional Mobility- Ind. Level  contact guard assist  -     Functional Mobility- Device  other (see comments) hand held assist  -     Functional Mobility-Distance (Feet)  4  -[facility] Name 06/03/21 1344          Activities of Daily Living    BADL Assessment/Intervention  lower body dressing  -SW     Row Name 06/03/21 1344          Lower Body Dressing Assessment/Training    [place] Level (Lower Body Dressing)  lower body dressing skills;socks;set up  -     Position (Lower Body Dressing)  unsupported sitting  -       User Key  (r) = Recorded By, (t) = Taken By, (c) = Cosigned By    Initials Name Provider Type     Cheryl Serrano OT Occupational Therapist        Obj/Interventions     Row Name 06/03/21 1344          Sensory Assessment (Somatosensory)    Sensory Assessment (Somatosensory)  UE sensation intact  -SW     Row Name 06/03/21 1344          Vision Assessment/Intervention    Visual Impairment/Limitations  WFL  -SW     Row Name 06/03/21 1344          Range of Motion Comprehensive    General Range of Motion  bilateral lower extremity ROM WFL  -SW     Row Name 06/03/21 1344          Strength Comprehensive (MMT)    General Manual Muscle Testing (MMT) Assessment  no strength deficits identified  -     Comment, General Manual Muscle Testing (MMT) Assessment  BLE 5/5 grossly  -SW     Row Name 06/03/21 1344          Balance    Balance Assessment  sitting static balance;sitting dynamic balance;sit to stand dynamic balance;standing static balance;standing dynamic balance  -     Static Sitting Balance  WNL;unsupported;sitting in chair  -     Dynamic Sitting  Balance  WNL;unsupported;sitting in chair  -SW     Sit to Stand Dynamic Balance  WFL  -SW     Static Standing Balance  WFL;supported;standing;unsupported  -SW     Dynamic Standing Balance  mild impairment;supported;standing  -SW     Balance Interventions  sitting;standing;sit to stand;supported;static;dynamic;minimal challenge  -SW       User Key  (r) = Recorded By, (t) = Taken By, (c) = Cosigned By    Initials Name Provider Type    Cheryl Dunbar OT Occupational Therapist        Goals/Plan     Row Name 06/03/21 1344          Bed Mobility Goal 1 (OT)    Activity/Assistive Device (Bed Mobility Goal 1, OT)  bed mobility activities, all  -SW     Patrick Level/Cues Needed (Bed Mobility Goal 1, OT)  independent  -SW     Time Frame (Bed Mobility Goal 1, OT)  long term goal (LTG);by discharge  -     Progress/Outcomes (Bed Mobility Goal 1, OT)  goal ongoing  -SW     Row Name 06/03/21 1344          Transfer Goal 1 (OT)    Activity/Assistive Device (Transfer Goal 1, OT)  transfers, all  -SW     Patrick Level/Cues Needed (Transfer Goal 1, OT)  independent  -SW     Time Frame (Transfer Goal 1, OT)  long term goal (LTG);by discharge  -     Progress/Outcome (Transfer Goal 1, OT)  goal ongoing  -SW     Row Name 06/03/21 1344          Dressing Goal 1 (OT)    Activity/Device (Dressing Goal 1, OT)  dressing skills, all  -SW     Patrick/Cues Needed (Dressing Goal 1, OT)  independent  -SW     Time Frame (Dressing Goal 1, OT)  long term goal (LTG);by discharge  -     Progress/Outcome (Dressing Goal 1, OT)  goal ongoing  -SW     Row Name 06/03/21 1344          Toileting Goal 1 (OT)    Activity/Device (Toileting Goal 1, OT)  toileting skills, all  -SW     Patrick Level/Cues Needed (Toileting Goal 1, OT)  independent  -SW     Time Frame (Toileting Goal 1, OT)  long term goal (LTG);by discharge  -SW     Row Name 06/03/21 1344          Therapy Assessment/Plan (OT)    Planned Therapy Interventions (OT)  activity  tolerance training;adaptive equipment training;BADL retraining;patient/caregiver education/training;functional balance retraining;strengthening exercise;transfer/mobility retraining  -       User Key  (r) = Recorded By, (t) = Taken By, (c) = Cosigned By    Initials Name Provider Type    Cheryl Dunbar OT Occupational Therapist        Clinical Impression     [facility] Name 06/03/21 1349          Pain Assessment    Additional Documentation  Pain Scale: Numbers Pre/Post-Treatment (Group)  -[facility] Name 06/03/21 1344          Pain Scale: Numbers Pre/Post-Treatment    Pretreatment Pain Rating  0/10 - no pain  -     Posttreatment Pain Rating  0/10 - no pain  -[facility] Name 06/03/21 134          Plan of Care Review    Plan of Care Reviewed With  patient  -     Outcome Summary  OT evaluation complete.  Pt presents with overall decreased endurance and requires extra time for processing and following through with directions.  Pt able to don socks with setup, STS with cga and took several steps forward and backwards.  Pt states he wants to return to home and may sign himself out.  OT recommended pt continue with medical advice and discuss with his doctors and team members.  Recommend continued IPOT and HHOT at d/c to promote indep in self care and safety.  -[facility] Name 06/03/21 1347          Therapy Assessment/Plan (OT)    Rehab Potential (OT)  good, to achieve stated therapy goals  -     Criteria for Skilled Therapeutic Interventions Met (OT)  yes;meets criteria;skilled treatment is necessary  -     Therapy Frequency (OT)  daily  -[facility] Name 06/03/21 1347          Therapy Plan Review/Discharge Plan (OT)    Anticipated Discharge Disposition (OT)  home with assist;home with home health  -[facility] Name 06/03/21 1345          Vital Signs    Pre Systolic BP Rehab  132  -SW     Pre Treatment Diastolic BP  89  -SW     Pretreatment Heart Rate (beats/min)  91  -SW     O2 Delivery Pre Treatment  room air  -      O2 Delivery Intra Treatment  room air  -SW     O2 Delivery Post Treatment  room air  -SW     Pre Patient Position  Sitting  -SW     Intra Patient Position  Standing  -SW     Post Patient Position  Sitting  -SW     Row Name 06/03/21 1344          Positioning and Restraints    Pre-Treatment Position  sitting in chair/recliner  -SW     Post Treatment Position  chair  -SW     In Chair  notified nsg;sitting;call light within reach;encouraged to call for assist;exit alarm on;with family/caregiver  -       User Key  (r) = Recorded By, (t) = Taken By, (c) = Cosigned By    Initials Name Provider Type    Cheryl Dunbar OT Occupational Therapist        Outcome Measures     Row Name 06/03/21 1344          How much help from another is currently needed...    Putting on and taking off regular lower body clothing?  4  -SW     Bathing (including washing, rinsing, and drying)  3  -SW     Toileting (which includes using toilet bed pan or urinal)  3  -SW     Putting on and taking off regular upper body clothing  4  -SW     Taking care of personal grooming (such as brushing teeth)  4  -SW     Eating meals  4  -SW     AM-PAC 6 Clicks Score (OT)  22  -SW     Row Name 06/03/21 1344          Functional Assessment    Outcome Measure Options  AM-PAC 6 Clicks Daily Activity (OT)  -       User Key  (r) = Recorded By, (t) = Taken By, (c) = Cosigned By    Initials Name Provider Type    Cheryl Dunbar OT Occupational Therapist        Occupational Therapy Education                 Title: PT OT SLP Therapies (In Progress)     Topic: Occupational Therapy (In Progress)     Point: ADL training (Done)     Description:   Instruct learner(s) on proper safety adaptation and remediation techniques during self care or transfers.   Instruct in proper use of assistive devices.              Learning Progress Summary           Patient Acceptance, E, VU,NR by DANIELE at 6/3/2021 1581                   Point: Home exercise program (Not Started)     Description:    Instruct learner(s) on appropriate technique for monitoring, assisting and/or progressing therapeutic exercises/activities.              Learner Progress:  Not documented in this visit.          Point: Precautions (Done)     Description:   Instruct learner(s) on prescribed precautions during self-care and functional transfers.              Learning Progress Summary           Patient Acceptance, E, VU,NR by  at 6/3/2021 6362                   Point: Body mechanics (Not Started)     Description:   Instruct learner(s) on proper positioning and spine alignment during self-care, functional mobility activities and/or exercises.              Learner Progress:  Not documented in this visit.                      User Key     Initials Effective Dates Name Provider Type Discipline     01/29/20 -  Cheryl Serrano, OT Occupational Therapist OT              OT Recommendation and Plan  Planned Therapy Interventions (OT): activity tolerance training, adaptive equipment training, BADL retraining, patient/caregiver education/training, functional balance retraining, strengthening exercise, transfer/mobility retraining  Therapy Frequency (OT): daily  Plan of Care Review  Plan of Care Reviewed With: patient  Outcome Summary: OT evaluation complete.  Pt presents with overall decreased endurance and requires extra time for processing and following through with directions.  Pt able to don socks with setup, STS with cga and took several steps forward and backwards.  Pt states he wants to return to home and may sign himself out.  OT recommended pt continue with medical advice and discuss with his doctors and team members.  Recommend continued IPOT and HHOT at d/c to promote indep in self care and safety.     Time Calculation:   Time Calculation- OT     Row Name 06/03/21 8366             Time Calculation- OT    OT Start Time  1344  -      OT Received On  06/03/21  -      OT Goal Re-Cert Due Date  06/13/21  -         Timed Charges     69818 - OT Self Care/Mgmt Minutes  25  -SW         Untimed Charges    OT Eval/Re-eval Minutes  40  -SW         Total Minutes    Timed Charges Total Minutes  25  -SW      Untimed Charges Total Minutes  40  -SW       Total Minutes  65  -SW        User Key  (r) = Recorded By, (t) = Taken By, (c) = Cosigned By    Initials Name Provider Type     Cheryl Serrano OT Occupational Therapist        Therapy Charges for Today     Code Description Service Date Service Provider Modifiers Qty    [id number]  OT SELF CARE/MGMT/TRAIN EA 15 MIN 6/3/2021 Cheryl Serrano OT GO 2    [id number]  OT EVAL MOD COMPLEXITY 3 6/3/2021 Cheryl Serrano OT GO 1               Cheryl Serrano OT  6/3/2021

## 2021-06-03 NOTE — PROGRESS NOTES
Clinical Nutrition   Reason For Visit: Follow-up protocol, EN, PN, PO    Patient Name: Jaime Connell  YOB: 1960  MRN: [id number]  Date of Encounter: 06/03/21 09:43 EDT  Admission date: 5/22/2021      -RD changed EN formula and increased rate toward goal due to current EN tolerance:  Peptamen AF @ 60 ml/hr. Free water @ 30 ml/hr.  This provides 1320 ml formula, 1584 calories, 100 g protein, 7 g fiber, 1071 ml free water from formula, 1731 ml total free water from formula/flushes.    >>>Goal EN regimen is Peptamen AF @ 70 ml/hr with free water @ 25 ml/hr. RD will assess tomorrow for readiness to advance to goal regimen as well as view surgeon's recs.  Peptamen AF @ 70 ml/hr with free water @ 25ml/hr will provide 1540 ml formula, 1848 calories (97% est needs), 117 g protein (102% est needs), 8 g fiber, 1249 ml free water from formula, 1799 ml total free water from formula.        -RD recommends decreasing PN rate from 65 ml/hr to 25 ml/hr since EN rate being increased.  D20%, AA7.5% @ 25 ml/hr provides 600 ml, 588 calories, 45 g protein, GIR = 1.08 mg/kg/min    >>>Once EN goal rate of 70 ml/hr is achieved and tolerated, PN may be discontinued.        -Will offer ONS drinks when PO tolerance established.      Nutrition Assessment     Admission Problem List:  Acute respiratory failure  Vent (5/22) --> self-extubated and then re-intubated (5/26) --> self-extubated (5/26) --> re-intubated (5/28)-> extubated (5/31)  Encephalopathy, improving  ALICIA, improved  Hematemesis  GI bleed  S/p EGD (5/23)- Large prepyloric ulcer with clot and proximal anterior wall ulcer with clot. [place] clip on Duodenal Ulcer. Massive intra operative bleeding. Clips applied.   S/p IR (5/23)- Coils x 10 deployed to Superior and inferior Pancreaticoduodenal and Gastroduodenal arteries as well as Gastroepiploic artery.  S/p EGD (5/25)- Large ulcer just past pylorus. 3 large prepyloric ulcers. Another ulcer on the lesser curve. Clips  applied  S/p exploratory laparotomy (5/28)- truncal vagotomy, oversewing of duodenal ulcer, pyloroplasty, cholecystectomy, gastrostomy and jejunostomy feeding tubes placed        PMH: He  has a past medical history of Acute blood loss anemia (7/20/2019), Back pain, Hepatitis C antibody test positive (7/20/2019), Large penetrating prepyloric gastric ulcer. Clip placed 7/20/2019 (CMS/HCC) (7/20/2019), and Smoker (7/20/2019).Tobacco use   PSxH: He  has a past surgical history that includes Back surgery; Esophagogastroduodenoscopy (N/A, 7/20/2019); Esophagogastroduodenoscopy (N/A, 5/23/2021); Esophagogastroduodenoscopy (N/A, 5/25/2021); Exploratory Laparotomy (N/A, 5/28/2021); and gastrostomy feeding tube insertion (N/A, 5/28/2021).       Applicable Nutrition-Related Information:  (5/22) NPO  (5/26) EN started per intensivist- Peptamen Intense VHP at 25 ml/hr and free water at 30 ml q 2 hrs via NGT  (5/27) EN on hold this AM since pt started passing bloody output from FMS; PN started per intensivist- 180 g dextrose, 75 g amino acids at 75 ml/hr with 200 ml/day smoflipid via PICC  (5/28) PN adjusted per intensivist- 225 g dextrose, 110 g amino acids at 65 ml/hr with 200 ml/day smoflipids  (5/30) trophic EN started per intensivist- Peptamen Intense VHP at 15 ml/hr and free water at 15 ml q 2 hrs via J-tube  (5/31) EN rate increased to 30 ml/hr and free water at 30 ml q 2 hrs. Pt had increased abdominal distention later that night after EN rate was increased to 30 ml/hr, surgeon decreased EN rate back to 15 ml/hr.  (6/1) EN on hold this AM per surgeon until results of CT of abdomen/pelvis. EN restarted at 1330, OK to advance as tolerated per surgeon. PN adjusted per RD- D20%, AA7.5% at 65 ml/hr with 200 ml/day smoflipids  (6/2) EN rate increased from 30 ml/hr to 45 ml/hr with free water continued @ 15 ml Q 2 hours. PN rate continued at 65 ml/hr.       Reported/Observed/Food/Nutrition Related History   RD notes clear liquid  diet initiated this morning. RN reports patient was given some chicken broth and jello, not yet sure how much patient consumed or how he did with it. Patient has been tolerating EN @ current rate of 45 ml/hr. RD informed RN of changes RD plans to make to EN and PN regimens.    Per I&O over the past 24 hrs:  G-tube output= 100 ml  MAURICE drain output= 50 ml  BM = x4      Anthropometrics   Height: 72 in  Weight: 170 lb per bed scale (6/3)  BMI: 23.2  BMI classification: Normal: 18.5-24.9kg/m2     Date Weight (kg) Weight (lbs) Weight Method   5/26/2021 82.8 kg 182 lb 8.7 oz Bed scale   5/25/2021 80.9 kg 178 lb 5.6 oz Bed scale   5/22/2021 76.5 kg 168 lb 10.4 oz Bed scale   5/22/2021 70.7 kg 155 lb 13.8 oz -   7/22/2019 70.7 kg 155 lb 13.8 oz Bed scale   7/21/2019 70.4 kg 155 lb 3.3 oz Bed scale   7/19/2019 79.4 kg 175 lb 0.7 oz -       Labs reviewed   Labs reviewed: Yes    Results from last 7 days   Lab Units 06/03/21  0530 06/02/21  0233 06/01/21  1543 06/01/21  0345 05/31/21  0405   SODIUM mmol/L 135* 133*  --  133* 139   POTASSIUM mmol/L 4.0 3.9 3.5 3.4* 3.8   CHLORIDE mmol/L 101 99  --  98 103   CO2 mmol/L 25.0 26.0  --  24.0 26.0   BUN mg/dL 22 20  --  19 23   CREATININE mg/dL 0.63* 0.67*  --  0.72* 0.66*   GLUCOSE mg/dL 162* 168*  --  121* 143*   CALCIUM mg/dL 8.9 8.5*  --  8.2* 8.3*   PHOSPHORUS mg/dL 2.5 1.7* 2.3* 1.8* 2.4*   MAGNESIUM mg/dL 1.9 1.7  --  2.3 1.6   TRIGLYCERIDES mg/dL  --   --   --   --  65       Medications reviewed   Medications reviewed: Yes  Pertinent: antibiotic, insulin, protonix  PRN: magnesium, potassium, phosphorus    Needs Assessment  (6/1)   Height used: 72 in/183 cm  Weight used: 168 lb/77 kg    Estimated Calories needs: ~1900 kcal/day  20-25 kcal/kg= 4219-0032 kcal    Estimated Protein needs: ~115 g pro/day  1.5 g/kg= 115 g pro      Current Nutrition Prescription   PO: Clear liquids     EN: Pepteman Intense VHP at 45 ml/h (990 ml goal volume) and free water at 15 ml q 2 hr  Route:  J-tube  Verified at the bedside: No - per RN report      PN: D20%, AA7.5% at 65 ml/hr (1560 ml goal volume)  Route: PICC  Verified at the bedside: No - per RN report      Evaluation of Received Nutrient/Fluid Intake-EN:  1 Day:  1047 ml formula and 190 ml water      Evaluation of Received Nutrient/Fluid Intake-PN:  1 Day:   1660 ml      Nutrition Diagnosis     5/25, updated 5/28,  Problem Inadequate energy intake   Etiology Clinical condition, diet order   Signs/Symptoms Pt requiring mechanical ventilation, massive GI bleed, multiple clips and coils applied, pressor   Status: resolved (5/27) when PN was initiated; now with combination of EN/PN since (5/30)    Intervention   Intervention: Follow treatment progress, Care plan reviewed, Encourage intake, Nutrition support order placed     -Continue with transition from EN/PN to EN/PO.        -RD changed EN formula and increased rate toward goal due to current EN tolerance:  Peptamen AF @ 60 ml/hr. Free water @ 30 ml/hr.  This provides 1320 ml formula, 1584 calories, 100 g protein, 7 g fiber, 1071 ml free water from formula, 1731 ml total free water from formula/flushes.    >>>Goal EN regimen is Peptamen AF @ 70 ml/hr with free water @ 25 ml/hr. RD will assess tomorrow for readiness to advance to goal regimen as well as view surgeon's recs.  Peptamen AF @ 70 ml/hr with free water @ 25ml/hr will provide 1540 ml formula, 1848 calories (97% est needs), 117 g protein (102% est needs), 8 g fiber, 1249 ml free water from formula, 1799 ml total free water from formula.        -RD recommends decreasing PN rate from 65 ml/hr to 25 ml/hr since EN rate being increased.  D20%, AA7.5% @ 25 ml/hr provides 600 ml, 588 calories, 45 g protein, GIR = 1.08 mg/kg/min    >>>Once EN goal rate of 70 ml/hr is achieved and tolerated, PN may be discontinued.        -Will offer ONS drinks when PO tolerance established.    Goal:   General: Nutrition support treatment  PO: Tolerate PO, Increase  intake, Advace diet as medically appropriate   EN/PN: Adjust EN, Adjust PN, PN to EN    Monitoring/Evaluation:   Monitoring/Evaluation: Per protocol, I&O, PO intake, Pertinent labs, EN delivery/tolerance, PN delivery/tolerance, Weight, GI status, Symptoms    Alexandria Mcgovern RD  Time Spent: 60 minutes

## 2021-06-03 NOTE — PROGRESS NOTES
Pharmacy Parenteral Nutrition Evaluation    Jaime Connell is a  60 y.o. male receiving TPN.     Labs  Results from last 7 days   Lab Units 06/03/21  0530 06/02/21 0233 06/01/21  1543 06/01/21 0345 05/31/21  0405 05/30/21  0917 05/29/21  0343   SODIUM mmol/L 135* 133*  --  133* 139 141 141   POTASSIUM mmol/L 4.0 3.9 3.5 3.4* 3.8 3.9 4.1   CHLORIDE mmol/L 101 99  --  98 103 105 105   CO2 mmol/L 25.0 26.0  --  24.0 26.0 30.0* 30.0*   BUN mg/dL 22 20  --  19 23 23 17   CREATININE mg/dL 0.63* 0.67*  --  0.72* 0.66* 0.70* 0.84   CALCIUM mg/dL 8.9 8.5*  --  8.2* 8.3* 7.9* 8.3*   BILIRUBIN mg/dL  --   --   --   --  0.7 0.6 0.9   ALK PHOS U/L  --   --   --   --  122* 80 75   ALT (SGPT) U/L  --   --   --   --  98* 66* 72*   AST (SGOT) U/L  --   --   --   --  109* 61* 59*   GLUCOSE mg/dL 162* 168*  --  121* 143* 159* 217*       Results from last 7 days   Lab Units 06/03/21 0530 06/02/21 0233 06/01/21  1543 06/01/21  0345 05/31/21  0405   MAGNESIUM mg/dL 1.9 1.7  --  2.3 1.6   PHOSPHORUS mg/dL 2.5 1.7* 2.3* 1.8* 2.4*   PREALBUMIN mg/dL  --   --   --   --  8.4*       Results from last 7 days   Lab Units 06/03/21 0530 06/02/21  1639 06/02/21  0233 06/01/21  0345   WBC 10*3/mm3 12.15*  --  12.53* 11.16*   HEMOGLOBIN g/dL 12.3* 12.6* 13.0 12.9*   HEMATOCRIT % 38.8 39.5 40.6 41.6   PLATELETS 10*3/mm3 322  --  273 216       Triglycerides   Date Value Ref Range Status   05/31/2021 65 0 - 150 mg/dL Final     Comment:     Falsely depressed results may occur on samples drawn from patients receiving N-Acetylcysteine (NAC) or Metamizole.       estimated creatinine clearance is 136.7 mL/min (A) (by C-G formula based on SCr of 0.63 mg/dL (L)).    Intake & Output (last 3 days)         05/31 0701 - 06/01 0700 06/01 0701 - 06/02 0700 06/02 0701 - 06/03 0700 06/03 0701 - 06/04 0700    P.O.    580    I.V. (mL/kg) 514.8 (6.2) 40.1 (0.5) 128.1 (1.7)     Other 212 105 190     NG/ 763 0379     IV Piggyback 229.2 961.3 569.2     TPN  1765.5 1762.3 1702.8     Total Intake(mL/kg) 2979.5 (36) 3201.8 (38.7) 3637.1 (46.9) 580 (7.5)    Urine (mL/kg/hr) 3725 (1.9) 1950 (1) 1850 (1) 500 (1.2)    Emesis/NG output 525 800 100     Drains 415 125 50     Stool  25 0     Total Output 4665 2900 2000 500    Net -1685.5 +301.8 +1637.1 +80            Urine Unmeasured Occurrence  3 x 4 x 1 x    Stool Unmeasured Occurrence  3 x 4 x             Dietitian Recommendations  Diet Orders (active) (From admission, onward)       Start     Ordered    06/01/21 0918  Diet, Tube Feeding Tube Feeding Formula: Peptamen Intense VHP (Peptide Based, Very High Protein)  Diet Effective Now      06/01/21 0918    05/22/21 2229  NPO Diet  Diet Effective Now      05/22/21 2229                    Current TPN Regimen Recommendation:  Dextrose 20% / Amino Acid 7.5% at goal rate of 25 mL/hr.      Assessment/Plan:    Macronutrients per dietary listed above with decreased rate to 25ml/hr.  Will continue standard lytes.   Replacement protocols are available.   EN started and working towards goal rate, once EN at goal will d/c PN.  Pharmacy will follow.    Richy Garcia, PharmD, BCPS  6/3/2021  12:23 EDT
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
## 2021-06-03 NOTE — PLAN OF CARE
Goal Outcome Evaluation:  Plan of Care Reviewed With: patient     Outcome Summary: OT evaluation complete.  Pt presents with overall decreased endurance and requires extra time for processing and following through with directions.  Pt able to don socks with setup, STS with cga and took several steps forward and backwards.  Pt states he wants to return to home and may sign himself out.  OT recommended pt continue with medical advice and discuss with his doctors and team members.  Recommend continued IPOT and HHOT at d/c to promote indep in self care and safety.

## 2021-06-03 NOTE — NURSING NOTE
At approx 1517 Pt was insistent on leaving AMA. Pt was educated on the risks of leaving. MD and surgeon notified. Abby Beaulieu MD talked to pt and he was still insistent on leaving. Pt was given Renay Tenorio's number and was encouraged to make an appointment in 1 week.

## 2021-06-03 NOTE — CASE MANAGEMENT/SOCIAL WORK
Continued Stay Note  [facility]     Patient Name: Jaime Connell  MRN: [id number]  Today's Date: 6/3/2021    Admit Date: 5/22/2021    Discharge Plan     Row Name 06/03/21 1510       Plan    Plan  SW    Plan Comments  SW recieved a call from APS worker Sohan Kuo inquiring about a zoom interview with patient on 6/4 at 9 am.                          Final Discharge Disposition Code  30 - still a patient        Discharge Codes    No documentation.       Expected Discharge Date and Time     Expected Discharge Date Expected Discharge Time    Jun 8, 2021             FRANSICO Escobar (Kay)

## 2021-06-04 NOTE — CASE MANAGEMENT/SOCIAL WORK
Continued Stay Note  [facility]     Patient Name: Jaime Connell  MRN: [id number]  Today's Date: 6/4/2021    Admit Date: 5/22/2021    Discharge Plan     Row Name 06/04/21 0920       Plan    Plan  SW    Plan Comments  DANIELE recieved a call from APS worker Sohan Kuo inquiring about a zoom interview with patient today at 9 am. DANIELE infroemd that patient has been discharged home. APS explained they would follow up and requested  Discharge Summary. DANIELE faxed records    Row Name 06/04/21 0813       Plan    Final Discharge Disposition Code  07 - left AMA        Discharge Codes    No documentation.       Expected Discharge Date and Time     Expected Discharge Date Expected Discharge Time    Jun 8, 2021             FRANSICO Escobar (Kay)
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
## 2021-12-20 ENCOUNTER — TRANSCRIBE ORDERS (OUTPATIENT)
Dept: ADMINISTRATIVE | Facility: HOSPITAL | Age: 61
End: 2021-12-20

## 2021-12-20 DIAGNOSIS — K43.2 INCISIONAL HERNIA WITHOUT OBSTRUCTION OR GANGRENE: Primary | ICD-10-CM

## 2023-05-15 ENCOUNTER — TRANSCRIBE ORDERS (OUTPATIENT)
Dept: ADMINISTRATIVE | Facility: HOSPITAL | Age: 63
End: 2023-05-15
Payer: MEDICARE

## 2023-05-15 DIAGNOSIS — R10.84 DIFFUSE ABDOMINAL PAIN: Primary | ICD-10-CM

## (undated) DEVICE — SPNG LAP PREWSH SFTPK 18X18IN STRL PK/5

## (undated) DEVICE — KT ORCA ORCAPOD DISP STRL

## (undated) DEVICE — SUT SILK 2/0 SH CR8 18IN CR8 C012D

## (undated) DEVICE — SUT SILK 3/0 SH CR8 18IN C013D

## (undated) DEVICE — GLV SURG BIOGEL LTX PF 7

## (undated) DEVICE — DRSNG WND BORDR/ADHS NONADHR/GZ LF 2X2IN STRL

## (undated) DEVICE — CONTN GRAD MEAS TRIANG 32OZ BLK

## (undated) DEVICE — SYR LUER/TIP MONOJECT RGD/PK 60CC STRL

## (undated) DEVICE — HLDR CATH FOL STATLOCK SWVL TRICOT

## (undated) DEVICE — Device

## (undated) DEVICE — APPL CHLORAPREP TINTED 26ML TEAL

## (undated) DEVICE — THE CARR-LOCKE INJECTION NEEDLE IS A SINGLE USE, DISPOSABLE, FLEXIBLE SHEATH INJECTION NEEDLE USED FOR THE INJECTION OF VARIOUS TYPES OF MEDIA THROUGH FLEXIBLE ENDOSCOPES.

## (undated) DEVICE — TB FEED GASTRO MIC 20F7TO10ML

## (undated) DEVICE — SUT PDS O CT1 CR/8 18IN Z740D

## (undated) DEVICE — DRSNG WND GZ PAD BORDERED 4X8IN STRL

## (undated) DEVICE — THE BITE BLOCK MAXI, LATEX FREE STRAP IS USED TO PROTECT THE ENDOSCOPE INSERTION TUBE FROM BEING BITTEN BY THE PATIENT.

## (undated) DEVICE — LEX GENERAL ABDOMINAL SPLIT: Brand: MEDLINE INDUSTRIES, INC.

## (undated) DEVICE — CVR HNDL LIGHT RIGID

## (undated) DEVICE — THE DISPOSABLE RAPTOR GRASPING DEVICE IS USED TO GRASP TISSUE AND/OR RETRIEVE FOREIGN BODIES, EXCISED TISSUE AND STENTS DURING ENDOSCOPIC PROCEDURES.: Brand: RAPTOR

## (undated) DEVICE — JP PERF DRN SIL FLT 10MM FULL: Brand: CARDINAL HEALTH

## (undated) DEVICE — HYBRID CO2 TUBING/CAP SET FOR OLYMPUS® SCOPES & CO2 SOURCE: Brand: ERBE

## (undated) DEVICE — JACKSON-PRATT 100CC BULB RESERVOIR: Brand: CARDINAL HEALTH

## (undated) DEVICE — INTRO ACCSR BLNT TP

## (undated) DEVICE — PENROSE DRAIN 18 X .5" SILICONE: Brand: MEDLINE

## (undated) DEVICE — SUT PDS LP 1 TP1 96IN VIO PDP880GA

## (undated) DEVICE — SPNG VERSALON 4X4 4PLY NONSTRL LF BG/200

## (undated) DEVICE — CATHETER,URETHRAL,REDRUBBER,STRL,16FR: Brand: MEDLINE

## (undated) DEVICE — SYR LUERLOK 50ML

## (undated) DEVICE — PATIENT RETURN ELECTRODE, SINGLE-USE, CONTACT QUALITY MONITORING, ADULT, WITH 9FT CORD, FOR PATIENTS WEIGING OVER 33LBS. (15KG): Brand: MEGADYNE

## (undated) DEVICE — TUBING, SUCTION, 1/4" X 10', STRAIGHT: Brand: MEDLINE

## (undated) DEVICE — UNDERGLV SURG BIOGEL INDICATOR LF PF 7.5

## (undated) DEVICE — DEV RETRV ESUCTION ESOPH SCP 8.6TO10MM 1P/U

## (undated) DEVICE — SINGLE-USE BIOPSY FORCEPS: Brand: RADIAL JAW 4

## (undated) DEVICE — SUT MNCRYL PLS ANTIB UD 4/0 PS2 18IN

## (undated) DEVICE — KT BIOGUARD SXN VLV AIR/H20 4PC DISP

## (undated) DEVICE — CLTH CLENS READYCLEANSE PERI CARE PK/5

## (undated) DEVICE — LUBE GEL ENDOGLIDE 1.1OZ

## (undated) DEVICE — SPNG CVR STR 2S 4X4

## (undated) DEVICE — SOL IRR H2O BTL 1000ML STRL

## (undated) DEVICE — SUT SILK 2/0 TIES 18IN A185H

## (undated) DEVICE — SUT ETHLN 2/0 PS 18IN 585H

## (undated) DEVICE — PROXIMATE RH ROTATING HEAD SKIN STAPLERS (35 WIDE) CONTAINS 35 STAINLESS STEEL STAPLES: Brand: PROXIMATE

## (undated) DEVICE — ANTIBACTERIAL UNDYED BRAIDED (POLYGLACTIN 910), SYNTHETIC ABSORBABLE SUTURE: Brand: COATED VICRYL

## (undated) DEVICE — TOTAL TRAY, 16FR 10ML SIL FOLEY, URN: Brand: MEDLINE

## (undated) DEVICE — IRRIGATOR TOOMEY 70CC